# Patient Record
Sex: FEMALE | Race: WHITE | NOT HISPANIC OR LATINO | Employment: OTHER | URBAN - METROPOLITAN AREA
[De-identification: names, ages, dates, MRNs, and addresses within clinical notes are randomized per-mention and may not be internally consistent; named-entity substitution may affect disease eponyms.]

---

## 2017-01-04 DIAGNOSIS — F51.04 CHRONIC INSOMNIA: ICD-10-CM

## 2017-01-04 RX ORDER — ZOLPIDEM TARTRATE 10 MG/1
TABLET ORAL
Qty: 30 TAB | Refills: 0 | Status: SHIPPED
Start: 2017-01-04 | End: 2017-01-31 | Stop reason: SDUPTHER

## 2017-01-31 RX ORDER — ZOLPIDEM TARTRATE 10 MG/1
TABLET ORAL
Qty: 30 TAB | Refills: 0 | Status: SHIPPED
Start: 2017-01-31 | End: 2018-02-27

## 2017-03-24 DIAGNOSIS — E03.4 HYPOTHYROIDISM DUE TO ACQUIRED ATROPHY OF THYROID: ICD-10-CM

## 2017-03-24 RX ORDER — LEVOTHYROXINE SODIUM 0.05 MG/1
TABLET ORAL
Qty: 30 TAB | Refills: 3 | Status: SHIPPED | OUTPATIENT
Start: 2017-03-24 | End: 2017-07-19 | Stop reason: SDUPTHER

## 2017-05-02 RX ORDER — ESTRADIOL 1 MG/1
TABLET ORAL
Qty: 90 TAB | Refills: 2 | Status: SHIPPED | OUTPATIENT
Start: 2017-05-02 | End: 2018-01-29 | Stop reason: SDUPTHER

## 2017-05-12 ENCOUNTER — PATIENT OUTREACH (OUTPATIENT)
Dept: HEALTH INFORMATION MANAGEMENT | Facility: OTHER | Age: 72
End: 2017-05-12

## 2017-05-12 NOTE — PROGRESS NOTES
5/12/17  -  Outcome: Left Message    WebIZ Checked & Epic Updated:  yes    HealthConnect Verified: yes    Attempt # 1

## 2017-05-15 NOTE — PROGRESS NOTES
Attempt #:incoming    WebIZ Checked & Epic Updated: yes  HealthConnect Verified: yes  Verify PCP: yes    Communication Preference Obtained: yes     Review Care Team: yes    Annual Wellness Visit Scheduling  1. Scheduling Status:Scheduled        Care Gap Scheduling (Attempt to Schedule EACH Overdue Care Gap!)     Health Maintenance Due   Topic Date Due   • IMM DTaP/Tdap/Td Vaccine (1 - Tdap) Will discuss with pcp   • Annual Wellness Visit  scheduled         MyChart Activation: declined  MyChart Julissa: no  Virtual Visits: no  Opt In to Text Messages: no

## 2017-06-05 ENCOUNTER — TELEPHONE (OUTPATIENT)
Dept: MEDICAL GROUP | Facility: CLINIC | Age: 72
End: 2017-06-05

## 2017-06-05 NOTE — Clinical Note
[facility]  Lena Camp M.D.  [address]  [place] [address]  Fax: [phone]   Authorization for Release/Disclosure of   Protected Health Information   Name: VANDANA BECKER : 1945 SSN: [id number]   Address: [address] [place] [address] [place] [address] Phone:    [phone] (home)    I authorize the entity listed below to release/disclose the PHI below to:   "[employer]/Lena Camp M.D. and Lena Camp M.D.   Provider or Entity Name: Wesley Barber M.D.       Address   City, [facility]   Phone:      Fax: [phone]     Reason for request: continuity of care   Information to be released:    [  ] LAST COLONOSCOPY,  including any PATH REPORT and follow-up  [  ] LAST FIT/COLOGUARD RESULT [  ] LAST DEXA  [  ] LAST MAMMOGRAM  [  ] LAST PAP  [  ] LAST LABS [ X ] RETINA EXAM REPORT  [  ] IMMUNIZATION RECORDS  [  ] Release all info      [  ] Check here and initial the line next to each item to release ALL health information INCLUDING  _____ Care and treatment for drug and / or alcohol abuse  _____ HIV testing, infection status, or AIDS  _____ Genetic Testing    DATES OF SERVICE OR TIME PERIOD TO BE DISCLOSED: Recent eye exam   I understand and acknowledge that:  * This Authorization may be revoked at any time by you in writing, except if your health information has already been used or disclosed.  * Your health information that will be used or disclosed as a result of you signing this authorization could be re-disclosed by the recipient. If this occurs, your re-disclosed health information may no longer be protected by State or Federal laws.  * You may refuse to sign this Authorization. Your refusal will not affect your ability to obtain treatment.  * This Authorization becomes effective upon signing and will  on (date) __________.      If no date is indicated, this Authorization will  one (1) year from the signature date.    Name: Vandana Becker    Signature:   Date:     6/5/2017       PLEASE FAX REQUESTED RECORDS BACK TO: [phone]

## 2017-06-05 NOTE — Clinical Note
Request for Medical Records    Patient Name: Vandana Beaver    : 1945      Dear Doctor Wesley Barber M.D.       The above named patient receives primary care at the [facility] by Lena Camp M.D..  The patient informs us that you are her eye care Provider.    Please fax a copy of the most recent eye exam to [phone] or answer the  questions below and fax this sheet back to us at the above number.  Attached is a signed Release of Information.      Date of last eye exam: _____________    Retinal eye exam summary:        Please select the choice(s) that apply.    ____ No diabetic retinopathy    ____    Diabetic retinopathy present      Printed Name and Credentials: __________________________________    Signature of Eye Care Provider: _________________________________    We appreciate your assistance and collaboration in providing efficient patient care!    Kindest Regards,    [address] [place] [address] [phone]

## 2017-06-05 NOTE — TELEPHONE ENCOUNTER
ANNUAL WELLNESS VISIT PRE-VISIT PLANNING     1.  Reviewed last PCP office visit assessment and plan notes: Yes    2.  If any orders were placed last visit do we have Results/Consult Notes?        •  Labs? Yes complete TSH       •  Imaging? No        •  Referrals? No     3.  Patient Care Coordination Note was updated with diagnosis information:  No Query for 2017    4.  Patient is due for these Health Maintenance Topics:   Health Maintenance Due   Topic Date Due   • IMM DTaP/Tdap/Td Vaccine (1 - Tdap) 05/26/1964   • Annual Wellness Visit  05/10/2017          •  ALYSE letter was faxed to:  Wesley Barber M.D.   for Retinal exam  records    5.  Immunizations were updated in [employer] using WebIZ?: Yes             •  Is patient due for Tdap/Shingles? Yes.  If yes, was patient alerted of copay? Yes    6.  Patient has No chronic diseases         7.  Updated Care Team with [employer] and all specialists?        •   Gait devices, O2, CPAP, etc: no        •   Eye professional: yes       •   Other specialists (GYN, cardiology, endo, etc): N\A    8.  Is patient in need of any refills prior to office visit? No       •    Separate refill encounter created?: N\A    9.  Patient was informed to arrive 15 min prior to their scheduled appointment and bring in their medication bottles? yes    10.  Patient was advised: “This is a free wellness visit. The provider will screen for medical conditions to help you stay healthy. If you have other concerns to address you may be asked to discuss these at a separate visit or there may be an additional fee.”  Yes

## 2017-06-06 ENCOUNTER — OFFICE VISIT (OUTPATIENT)
Dept: MEDICAL GROUP | Facility: CLINIC | Age: 72
End: 2017-06-06
Payer: MEDICARE

## 2017-06-06 VITALS
WEIGHT: 173 LBS | HEIGHT: 62 IN | RESPIRATION RATE: 16 BRPM | SYSTOLIC BLOOD PRESSURE: 150 MMHG | DIASTOLIC BLOOD PRESSURE: 80 MMHG | HEART RATE: 58 BPM | OXYGEN SATURATION: 95 % | BODY MASS INDEX: 31.83 KG/M2 | TEMPERATURE: 97.5 F

## 2017-06-06 DIAGNOSIS — E03.4 HYPOTHYROIDISM DUE TO ACQUIRED ATROPHY OF THYROID: ICD-10-CM

## 2017-06-06 DIAGNOSIS — Z00.00 MEDICARE ANNUAL WELLNESS VISIT, SUBSEQUENT: ICD-10-CM

## 2017-06-06 DIAGNOSIS — Z87.01 HISTORY OF PNEUMONIA: ICD-10-CM

## 2017-06-06 DIAGNOSIS — Z96.1 HISTORY OF CATARACT REMOVAL WITH INSERTION OF PROSTHETIC LENS: ICD-10-CM

## 2017-06-06 DIAGNOSIS — Z23 NEED FOR TDAP VACCINATION: ICD-10-CM

## 2017-06-06 DIAGNOSIS — Z98.890 HISTORY OF CARPAL TUNNEL SURGERY OF RIGHT WRIST: ICD-10-CM

## 2017-06-06 DIAGNOSIS — E78.5 DYSLIPIDEMIA, GOAL LDL BELOW 160: ICD-10-CM

## 2017-06-06 DIAGNOSIS — Z98.49 HISTORY OF CATARACT REMOVAL WITH INSERTION OF PROSTHETIC LENS: ICD-10-CM

## 2017-06-06 DIAGNOSIS — K76.89 SIMPLE HEPATIC CYST: ICD-10-CM

## 2017-06-06 DIAGNOSIS — F51.04 CHRONIC INSOMNIA: ICD-10-CM

## 2017-06-06 DIAGNOSIS — H40.053 INCREASED INTRAOCULAR PRESSURE, BILATERAL: ICD-10-CM

## 2017-06-06 DIAGNOSIS — E66.9 OBESITY (BMI 30.0-34.9): ICD-10-CM

## 2017-06-06 DIAGNOSIS — N18.30 CKD (CHRONIC KIDNEY DISEASE) STAGE 3, GFR 30-59 ML/MIN (HCC): ICD-10-CM

## 2017-06-06 PROBLEM — E66.811 OBESITY (BMI 30.0-34.9): Status: ACTIVE | Noted: 2017-06-06

## 2017-06-06 PROBLEM — H40.059 INCREASED INTRAOCULAR PRESSURE: Status: ACTIVE | Noted: 2017-06-06

## 2017-06-06 PROCEDURE — G0439 PPPS, SUBSEQ VISIT: HCPCS | Mod: 25 | Performed by: FAMILY MEDICINE

## 2017-06-06 PROCEDURE — 90471 IMMUNIZATION ADMIN: CPT | Performed by: FAMILY MEDICINE

## 2017-06-06 PROCEDURE — G8432 DEP SCR NOT DOC, RNG: HCPCS | Performed by: FAMILY MEDICINE

## 2017-06-06 PROCEDURE — 1036F TOBACCO NON-USER: CPT | Performed by: FAMILY MEDICINE

## 2017-06-06 PROCEDURE — 90715 TDAP VACCINE 7 YRS/> IM: CPT | Performed by: FAMILY MEDICINE

## 2017-06-06 RX ORDER — TIMOLOL MALEATE 5 MG/ML
1 SOLUTION/ DROPS OPHTHALMIC 2 TIMES DAILY
COMMUNITY
End: 2022-11-17

## 2017-06-06 ASSESSMENT — PAIN SCALES - GENERAL: PAINLEVEL: NO PAIN

## 2017-06-06 ASSESSMENT — PATIENT HEALTH QUESTIONNAIRE - PHQ9: CLINICAL INTERPRETATION OF PHQ2 SCORE: 0

## 2017-06-06 NOTE — MR AVS SNAPSHOT
"        Vandana Beaver   2017 1:00 PM   Office Visit   MRN: [id number]    Department:  [facility]   Dept Phone:  [phone]    Description:  Female : 1945   Provider:  Lena Camp M.D.; [facility]            Reason for Visit     Annual Wellness Visit           Allergies as of 2017     Allergen Noted Reactions    Codeine 2010   Vomiting      You were diagnosed with     Medicare annual wellness visit, subsequent   [180806]       Need for Tdap vaccination   [350309]       Age-related nuclear cataract of right eye   [921632]       Carpal tunnel syndrome of right wrist   [759466]       Chronic insomnia   [303197]       CKD (chronic kidney disease) stage 3, GFR 30-59 ml/min   [345944]       Dyslipidemia, goal LDL below 160   [525114]       History of pneumonia   [918985]       Hypothyroidism due to acquired atrophy of thyroid   [2714233]       Obesity (BMI 30.0-34.9)   [963089]       Increased intraocular pressure, bilateral   [5408560]       Simple hepatic cyst   [799675]         Vital Signs     Blood Pressure Pulse Temperature Respirations Height Weight    150/80 mmHg 58 36.4 °C (97.6 °F) 16 1.575 m (5' 2.01\") 78.472 kg (173 lb)    Body Mass Index Oxygen Saturation Smoking Status             31.63 kg/m2 95% Never Smoker          Basic Information     Date Of Birth Sex Race Ethnicity Preferred Language    1945 Female White Non- English      Problem List              ICD-10-CM Priority Class Noted - Resolved    Dyslipidemia, goal LDL below 160 E78.5   2010 - Present    Chronic insomnia F51.04   Unknown - Present    Seasonal allergies J30.2   Unknown - Present    MEDICAL HOME    10/23/2012 - Present    Carpal tunnel syndrome of right wrist G56.01   Unknown - Present    Hypothyroidism due to acquired atrophy of thyroid E03.4   Unknown - Present    History of pneumonia Z87.01   2014 - Present    CKD (chronic kidney disease) stage 3, GFR 30-59 ml/min N18.3 " Medium  5/21/2015 - Present    Age-related nuclear cataract of eye H25.10   9/1/2016 - Present    Age-related nuclear cataract of right eye H25.11   9/14/2016 - Present    Obesity (BMI 30.0-34.9) E66.9   6/6/2017 - Present    Hypothyroidism E03.9   Unknown - Present    Increased intraocular pressure H40.059   6/6/2017 - Present    Simple hepatic cyst K76.89   6/6/2017 - Present      Health Maintenance        Date Due Completion Dates    IMM DTaP/Tdap/Td Vaccine (1 - Tdap) 5/26/1964 ---    MAMMOGRAM 12/7/2017 12/7/2016, 11/9/2015, 9/22/2014, 9/11/2013, 8/17/2012, 7/27/2011, 5/20/2010, 5/20/2010, 1/22/2008, 1/22/2008, 12/4/2006, 12/4/2006, 2/22/2006, 9/2/2005, 7/14/2004    BONE DENSITY 6/2/2020 6/2/2015, 5/20/2010, 1/22/2008, 9/2/2005    COLONOSCOPY 10/18/2020 10/18/2010 (Prv Comp), 10/18/2010    Override on 10/18/2010: Previously completed            Current Immunizations     13-VALENT PCV PREVNAR 5/9/2016  1:45 PM    Influenza TIV (IM) 10/9/2014, 10/2/2013    Influenza Vaccine Adult HD 10/14/2015    Influenza Vaccine Quad Inj (Pf) 10/6/2010    Influenza Vaccine Quad Inj (Preserved) 10/8/2012, 10/15/2011    Pneumococcal polysaccharide vaccine (PPSV-23) 7/29/2013, 7/3/2013    SHINGLES VACCINE 11/5/2013    Tdap Vaccine  Incomplete      Below and/or attached are the medications your provider expects you to take. Review all of your home medications and newly ordered medications with your provider and/or pharmacist. Follow medication instructions as directed by your provider and/or pharmacist. Please keep your medication list with you and share with your provider. Update the information when medications are discontinued, doses are changed, or new medications (including over-the-counter products) are added; and carry medication information at all times in the event of emergency situations     Allergies:  CODEINE - Vomiting               Medications  Valid as of: June 06, 2017 -  2:37 PM    Generic Name Brand Name Tablet  Size Instructions for use    Calcium Carbonate-Vitamin D   Take  by mouth every day.        Cetirizine HCl (Tab) ZYRTEC 10 MG Take 10 mg by mouth as needed.        Estradiol (Tab) ESTRACE 1 MG TAKE ONE TABLET BY MOUTH ONCE DAILY        Krill Oil   Take  by mouth every day.        Levothyroxine Sodium (Tab) SYNTHROID 50 MCG TAKE ONE TABLET BY MOUTH ONCE DAILY IN THE MORNING ON  AN  EMPTY  STOMACH        Misc Natural Products   Take  by mouth every day.        Multiple Vitamins-Minerals   Take  by mouth every day.        Multiple Vitamins-Minerals   Take  by mouth every day.        Timolol Maleate (Solution) TIMOPTIC 0.5 % Place 1 Drop in both eyes 2 times a day.        Zolpidem Tartrate (Tab) AMBIEN 10 MG TAKE ONE TABLET BY MOUTH ONCE DAILY AT BEDTIME AS NEEDED        .                 Medicines prescribed today were sent to:     [facility] [address] [place] [address], [place] - [address]    [address] [place] [address]    Phone: [phone] Fax: [phone]    Open 24 Hours?: No      Medication refill instructions:       If your prescription bottle indicates you have medication refills left, it is not necessary to call your provider’s office. Please contact your pharmacy and they will refill your medication.    If your prescription bottle indicates you do not have any refills left, you may request refills at any time through one of the following ways: The online [employer] system (except Urgent Care), by calling your provider’s office, or by asking your pharmacy to contact your provider’s office with a refill request. Medication refills are processed only during regular business hours and may not be available until the next business day. Your provider may request additional information or to have a follow-up visit with you prior to refilling your medication.   *Please Note: Medication refills are assigned a new Rx number when refilled electronically. Your pharmacy may indicate that no refills were authorized  even though a new prescription for the same medication is available at the pharmacy. Please request the medicine by name with the pharmacy before contacting your provider for a refill.        Your To Do List     Future Labs/Procedures Complete By Expires    CBC WITHOUT DIFFERENTIAL  As directed 12/7/2017    COMP METABOLIC PANEL  As directed 6/7/2018    LIPID PROFILE  As directed 6/7/2018    TSH  As directed 6/7/2018      Other Notes About Your Plan     Patient is enrolled in [facility] With Dr. Camp.            MyChart Status: Patient Declined

## 2017-06-06 NOTE — PROGRESS NOTES
Chief Complaint   Patient presents with   • Annual Wellness Visit         HPI:  Vandana Beaver is a 72 y.o. female here for Medicare Annual Wellness Visit    She drives.  She lives with her spouse.      Patient Active Problem List    Diagnosis Date Noted   • CKD (chronic kidney disease) stage 3, GFR 30-59 ml/min 05/21/2015     Priority: Medium   • Age-related nuclear cataract of right eye 09/14/2016   • Age-related nuclear cataract of eye 09/01/2016   • History of pneumonia 02/19/2014   • Hypothyroidism due to acquired atrophy of thyroid    • Carpal tunnel syndrome of right wrist    • MEDICAL HOME 10/23/2012   • Seasonal allergies    • Chronic insomnia    • Dyslipidemia, goal LDL below 160 05/01/2010       Current Outpatient Prescriptions   Medication Sig Dispense Refill   • timolol (TIMOPTIC) 0.5 % Solution Place 1 Drop in both eyes 2 times a day.     • estradiol (ESTRACE) 1 MG Tab TAKE ONE TABLET BY MOUTH ONCE DAILY 90 Tab 2   • levothyroxine (SYNTHROID) 50 MCG Tab TAKE ONE TABLET BY MOUTH ONCE DAILY IN THE MORNING ON  AN  EMPTY  STOMACH 30 Tab 3   • Calcium Carbonate-Vitamin D (CALTRATE 600+D PO) Take  by mouth every day.     • KRILL OIL PO Take  by mouth every day.     • Multiple Vitamins-Minerals (MULTIVITAMIN PO) Take  by mouth every day.     • zolpidem (AMBIEN) 10 MG Tab TAKE ONE TABLET BY MOUTH ONCE DAILY AT BEDTIME AS NEEDED 30 Tab 0   • Multiple Vitamins-Minerals (VISION FORMULA PO) Take  by mouth every day.     • Misc Natural Products (OSTEO BI-FLEX JOINT SHIELD PO) Take  by mouth every day.     • cetirizine (ZYRTEC) 10 MG TABS Take 10 mg by mouth as needed. 30 Tab 3     No current facility-administered medications for this visit.        Patient is taking medications as noted in medication list.  Current supplements as per medication list.   Chronic narcotic pain medicines: no    Allergies: Codeine    Current social contact/activities: Vandana is involved with hot august nights, gets together with  family often     Is patient current with immunizations?  No, due for TDAP. Patient is interested in receiving TDAP today.     She  reports that she has never smoked. She has never used smokeless tobacco. She reports that she drinks alcohol. She reports that she does not use illicit drugs.  Counseling given: Not Answered        DPA/Advanced Directive:  Patient has Advanced Directive on file.     ROS:    Gait: Uses no assistive device   Ostomy: no   Other tubes: no   Amputations: no   Chronic oxygen use: no   Last eye exam: 4/3/17   Wears hearing aids: no   : Denies incontinence.   Denies chest pain, pressure, palpitations or exertional SOB  Denies abdominal pain, pressure.  Denies blood or mucus in the stool.      Depression Screening    Little interest or pleasure in doing things?  0 - not at all  Feeling down, depressed, or hopeless?  0 - not at all  Patient Health Questionnaire Score: 0  If depressive symptoms identified deferred to follow up visit unless specifically addressed in assessment and plan.    Interpretation of PHQ-9 Total Score   Score Severity   1-4 Minimal Depression   5-9 Mild Depression   10-14 Moderate Depression   15-19 Moderately Severe Depression   20-27 Severe Depression    Screening for Cognitive Impairment    Three Minute Recall (banana, sunrise, fence)  3/3    Draw clock face with all 12 numbers set to the hand to show 10 minutes past 11 o'clock  1 5/5  If cognitive concerns identified deferred to follow up visit unless specifically addressed in assessment and plan.    Fall Risk Assessment    Has the patient had two or more falls in the last year or any fall with injury in the last year?  No  If Fall Risk identified deferred to follow up visit unless specifically addressed in assessment and plan.    Safety Assessment    Throw rugs on floor.  Yes  Handrails on all stairs.  Yes  Good lighting in all hallways.  Yes  Difficulty hearing.  No  Patient counseled about all safety risks that were  identified.    Functional Assessment ADLs    Are there any barriers preventing you from cooking for yourself or meeting nutritional needs?  No.    Are there any barriers preventing you from driving safely or obtaining transportation?  No.    Are there any barriers preventing you from using a telephone or calling for help?  No.    Are there any barriers preventing you from shopping?  No.    Are there any barriers preventing you from taking care of your own finances?  No.    Are there any barriers preventing you from managing your medications?  No.    Are currently engaging any exercise or physical activity?  Yes.  Vandana goes to the gym 3 days a week approx 1 hr each time, cardio treadmill, bike    Health Maintenance Summary                IMM DTaP/Tdap/Td Vaccine Overdue 5/26/1964     Annual Wellness Visit Overdue 5/10/2017      Done 5/9/2016 Visit Dx: Medicare annual wellness visit, subsequent     Patient has more history with this topic...    MAMMOGRAM Next Due 12/7/2017      Done 12/7/2016 MA-MAMMO SCREENING BILAT W/TOMOSYNTHESIS W/CAD     Patient has more history with this topic...    BONE DENSITY Next Due 6/2/2020      Done 6/2/2015 DS-BONE DENSITY STUDY (DEXA)     Patient has more history with this topic...    COLONOSCOPY Next Due 10/18/2020      Done 10/18/2010 REFERRAL TO GI FOR COLONOSCOPY     Patient has more history with this topic...          Patient Care Team:  Lena Camp M.D. as PCP - General  Wesley Barber M.D. as Consulting Physician (Ophthalmology)    Social History   Substance Use Topics   • Smoking status: Never Smoker    • Smokeless tobacco: Never Used   • Alcohol Use: 0.0 oz/week     0 Standard drinks or equivalent per week      Comment: 1 per month     Family History   Problem Relation Age of Onset   • Diabetes Maternal Grandfather    • Lung Disease Mother      66, heavy smoker, emphysema   • Osteoporosis Mother      She  has a past medical history of Seasonal allergies; Dyslipidemia,  "goal LDL below 160 (5/2010); Chronic insomnia; Carpal tunnel syndrome of right wrist; Anesthesia; MEDICAL HOME (10/23/12); Hypothyroidism; History of pneumonia; CKD (chronic kidney disease) stage 3, GFR 30-59 ml/min; Cataract; and Pneumonia (1/8/2016). She also has no past medical history of Breast cancer (CMS-HCC).   Past Surgical History   Procedure Laterality Date   • Abdominal hysterectomy total  1980s     +ovaries + falopian, uterine fibroids   • Colonoscopy  10/18/10     martha, Dr. Leung   • [place] by laparoscopy  11/11/2011     Performed by HECTOR TALAVERA at SURGERY [facility]   • Tonsillectomy  1960   • Carpal tunnel release Right 4/15/2016     Procedure: CARPAL TUNNEL RELEASE;  Surgeon: Chalino Urrutia M.D.;  Location: SURGERY [facility];  Service:    • Cataract phaco with iol Left 9/1/2016     Procedure: CATARACT PHACO WITH IOL;  Surgeon: Wesley Barber M.D.;  Location: SURGERY SAME DAY [facility];  Service:    • Cataract phaco with iol Right 9/14/2016     Procedure: CATARACT PHACO WITH IOL KPE;  Surgeon: Wesley Barber M.D.;  Location: SURGERY SAME DAY [facility];  Service:            Exam:     Blood pressure 150/80, pulse 58, temperature 36.4 °C (97.6 °F), resp. rate 16, height 1.575 m (5' 2.01\"), weight 78.472 kg (173 lb), SpO2 95 %. Body mass index is 31.63 kg/(m^2).    Hearing good.    Dentition good  Alert, oriented in no acute distress.  Eye contact is good, speech goal directed, affect calm  HEENT:   EOMI, PERRLA.   Oropharynx is well hydrated with normal soft palate motion. No exudate or ulcerations noted. Ear canals are patent, normal cerumen, TMs are pearly grey and quiet in appearance.  Neck:       Full range of motion except mild limitation to extension, moderate posterior cervical spasm. No JVD or carotid bruits appreciated. No cervical adenopathy appreciated. No thyromegaly or neck masses appreciated.    Lungs:     Clear to auscultation A&P with good air movement.   Heart:  "     Regular rate and rhythm normal S1 and S2 without murmur appreciated.  Strong and symmetric radial and DP pulses.  Abd:         Soft, bowel sounds positive, nontender. No bloating noted. No hepatosplenomegaly or mass appreciated. No pulsatile mass appreciated.  Ext:          Extremities show symmetric and full range of motion with normal strength. No cyanosis or clubbing appreciated. No peripheral edema appreciated.  Neuro:     Gait is normal. Patient is lucid, fluent and appropriate. No significant tremor appreciated.  Skin:        Exhibit no rashes, pigmented lesions or ulcerations.      Assessment and Plan. The following treatment and monitoring plan is recommended:      1. Medicare annual wellness visit, subsequent  Her medical problems are generally stable    2. Need for Tdap vaccination  Administered today, patient has been alerted to the co-pay  - TDAP VACCINE =>8YO IM    3. History of cataract removal bilaterally with intraocular lens  She continues to follow with ophthalmology yearly.  She feels this greatly improved her vision. Stable.    4. History of carpal tunnel release right wrist  This has been successful for her. She denies significant symptoms from this at this time. Denies symptoms in the left wrist. Stable.    5. Chronic insomnia  This is severe and has been present since childhood. She struggled with the discontinuation of the Ambien. It took nearly 3 months for her to reestablish a sleeping pattern. She still has to use over-the-counter sleeping aids from time to time.  She feels this is currently stable.    6. CKD (chronic kidney disease) stage 3, GFR 30-59 ml/min  Patient has long-standing chronic kidney disease, stage 3. This has been stable over the last 8 years. It is mild.  Patient states has been keeping well hydrated and has been trying to walk daily. The patient avoids non-steroidal anti-inflammatory medications. This is clinically stable.     7. Dyslipidemia target LDL below  160  Patient denies chest pain, chest pressure, palpitations or exertional shortness of breath. She continues a cruel oil supplement. Her dyslipidemia has been moderately high with a reasonable risk ratio. She is not on a statin.. She is a never smoker. She takes 81 mg aspirin twice a week. She has no history of myocardial infarction, stroke or PVD.  The problem is clinically stable.    8. History of pneumonia  None since, has completed the pneumonia vaccine series.    9. Hypothyroidism due to acquired atrophy of thyroid  Patient reports good energy level on the medication. Patient denies insomnia, tremor or change in appetite.  Patient is taking the medication on an empty stomach in the morning and waiting at least 30 minutes before eating.  Last TSH in May 2016 was at target.  This is clinically stable.    10. Obesity (BMI 30.0-34.9)  Discussed her overweight and overall weight gain.  She states she is aware and will be increasing her exercise. She confesses to a weakness for ice cream. Discussed good dietary choices.  - Patient identified as having weight management issue.  Appropriate orders and counseling given.    11. Increased intraocular pressure, bilateral  She has been placed on atenolol with a resolution of her increased intraocular pressure. She initially was 24 and 27 and she is now at 16 and 17. She continues to follow with Dr. Barber her ophthalmologist on a regular basis, every 6 months. She will continue the atenolol at his direction. Stable problem.    12. Simple hepatic cyst  She has multiple simple hepatic cysts which have been gradually enlarging. They have been imaged with both ultrasound and CT and are felt to be benign. They were first picked up on imaging over 10 years ago. This is a stable problem.    Routine lab orders are placed    Services suggested: No services needed at this time  Health Care Screening recommendations as per orders if indicated.  Referrals offered: PT/OT/Nutrition  counseling/Behavioral Health/Smoking cessation as per orders if indicated.    Discussion today about general wellness and lifestyle habits:  discussed  · Prevent falls and reduce trip hazards; Cautioned about securing or removing rugs.  · Have a working fire alarm and carbon monoxide detector; yes  · Engage in regular physical activity and social activities       Follow-up: six months

## 2017-06-08 ENCOUNTER — HOSPITAL ENCOUNTER (OUTPATIENT)
Dept: LAB | Facility: MEDICAL CENTER | Age: 72
End: 2017-06-08
Attending: FAMILY MEDICINE
Payer: MEDICARE

## 2017-06-08 DIAGNOSIS — E78.5 DYSLIPIDEMIA, GOAL LDL BELOW 160: ICD-10-CM

## 2017-06-08 DIAGNOSIS — E03.4 HYPOTHYROIDISM DUE TO ACQUIRED ATROPHY OF THYROID: ICD-10-CM

## 2017-06-08 DIAGNOSIS — N18.30 CKD (CHRONIC KIDNEY DISEASE) STAGE 3, GFR 30-59 ML/MIN (HCC): ICD-10-CM

## 2017-06-08 LAB
ALBUMIN SERPL BCP-MCNC: 3.8 G/DL (ref 3.2–4.9)
ALBUMIN/GLOB SERPL: 1.5 G/DL
ALP SERPL-CCNC: 49 U/L (ref 30–99)
ALT SERPL-CCNC: 14 U/L (ref 2–50)
ANION GAP SERPL CALC-SCNC: 6 MMOL/L (ref 0–11.9)
AST SERPL-CCNC: 14 U/L (ref 12–45)
BILIRUB SERPL-MCNC: 0.4 MG/DL (ref 0.1–1.5)
BUN SERPL-MCNC: 14 MG/DL (ref 8–22)
CALCIUM SERPL-MCNC: 8.6 MG/DL (ref 8.5–10.5)
CHLORIDE SERPL-SCNC: 106 MMOL/L (ref 96–112)
CHOLEST SERPL-MCNC: 240 MG/DL (ref 100–199)
CO2 SERPL-SCNC: 26 MMOL/L (ref 20–33)
CREAT SERPL-MCNC: 0.95 MG/DL (ref 0.5–1.4)
ERYTHROCYTE [DISTWIDTH] IN BLOOD BY AUTOMATED COUNT: 43.2 FL (ref 35.9–50)
GFR SERPL CREATININE-BSD FRML MDRD: 58 ML/MIN/1.73 M 2
GLOBULIN SER CALC-MCNC: 2.6 G/DL (ref 1.9–3.5)
GLUCOSE SERPL-MCNC: 88 MG/DL (ref 65–99)
HCT VFR BLD AUTO: 42.2 % (ref 37–47)
HDLC SERPL-MCNC: 86 MG/DL
HGB BLD-MCNC: 13.9 G/DL (ref 12–16)
LDLC SERPL CALC-MCNC: 134 MG/DL
MCH RBC QN AUTO: 32.7 PG (ref 27–33)
MCHC RBC AUTO-ENTMCNC: 32.9 G/DL (ref 33.6–35)
MCV RBC AUTO: 99.3 FL (ref 81.4–97.8)
PLATELET # BLD AUTO: 229 K/UL (ref 164–446)
PMV BLD AUTO: 12 FL (ref 9–12.9)
POTASSIUM SERPL-SCNC: 4.3 MMOL/L (ref 3.6–5.5)
PROT SERPL-MCNC: 6.4 G/DL (ref 6–8.2)
RBC # BLD AUTO: 4.25 M/UL (ref 4.2–5.4)
SODIUM SERPL-SCNC: 138 MMOL/L (ref 135–145)
TRIGL SERPL-MCNC: 101 MG/DL (ref 0–149)
TSH SERPL DL<=0.005 MIU/L-ACNC: 3.02 UIU/ML (ref 0.3–3.7)
WBC # BLD AUTO: 6.9 K/UL (ref 4.8–10.8)

## 2017-06-08 PROCEDURE — 84443 ASSAY THYROID STIM HORMONE: CPT

## 2017-06-08 PROCEDURE — 85027 COMPLETE CBC AUTOMATED: CPT

## 2017-06-08 PROCEDURE — 36415 COLL VENOUS BLD VENIPUNCTURE: CPT

## 2017-06-08 PROCEDURE — 80053 COMPREHEN METABOLIC PANEL: CPT

## 2017-06-08 PROCEDURE — 80061 LIPID PANEL: CPT

## 2017-07-19 DIAGNOSIS — E03.4 HYPOTHYROIDISM DUE TO ACQUIRED ATROPHY OF THYROID: ICD-10-CM

## 2017-07-19 RX ORDER — LEVOTHYROXINE SODIUM 0.05 MG/1
TABLET ORAL
Qty: 30 TAB | Refills: 0 | Status: SHIPPED | OUTPATIENT
Start: 2017-07-19 | End: 2017-08-21 | Stop reason: SDUPTHER

## 2017-08-21 RX ORDER — LEVOTHYROXINE SODIUM 0.05 MG/1
TABLET ORAL
Qty: 30 TAB | Refills: 3 | Status: SHIPPED | OUTPATIENT
Start: 2017-08-21 | End: 2017-12-19 | Stop reason: SDUPTHER

## 2017-10-25 ENCOUNTER — APPOINTMENT (RX ONLY)
Dept: URBAN - METROPOLITAN AREA CLINIC 4 | Facility: CLINIC | Age: 72
Setting detail: DERMATOLOGY
End: 2017-10-25

## 2017-10-25 DIAGNOSIS — L81.4 OTHER MELANIN HYPERPIGMENTATION: ICD-10-CM

## 2017-10-25 DIAGNOSIS — L57.0 ACTINIC KERATOSIS: ICD-10-CM

## 2017-10-25 DIAGNOSIS — L82.1 OTHER SEBORRHEIC KERATOSIS: ICD-10-CM

## 2017-10-25 PROBLEM — E03.9 HYPOTHYROIDISM, UNSPECIFIED: Status: ACTIVE | Noted: 2017-10-25

## 2017-10-25 PROCEDURE — ? LIQUID NITROGEN

## 2017-10-25 PROCEDURE — 17000 DESTRUCT PREMALG LESION: CPT

## 2017-10-25 PROCEDURE — 99202 OFFICE O/P NEW SF 15 MIN: CPT | Mod: 25

## 2017-10-25 PROCEDURE — 17003 DESTRUCT PREMALG LES 2-14: CPT

## 2017-10-25 PROCEDURE — ? COUNSELING

## 2017-10-25 ASSESSMENT — LOCATION DETAILED DESCRIPTION DERM
LOCATION DETAILED: RIGHT PROXIMAL CALF
LOCATION DETAILED: RIGHT DISTAL DORSAL FOREARM
LOCATION DETAILED: RIGHT RADIAL DORSAL HAND
LOCATION DETAILED: RIGHT PROXIMAL DORSAL FOREARM
LOCATION DETAILED: LEFT RADIAL DORSAL HAND
LOCATION DETAILED: LEFT DISTAL DORSAL FOREARM
LOCATION DETAILED: RIGHT ULNAR DORSAL HAND
LOCATION DETAILED: LEFT PROXIMAL CALF
LOCATION DETAILED: LEFT PROXIMAL DORSAL FOREARM
LOCATION DETAILED: RIGHT DISTAL CALF

## 2017-10-25 ASSESSMENT — LOCATION SIMPLE DESCRIPTION DERM
LOCATION SIMPLE: RIGHT CALF
LOCATION SIMPLE: RIGHT FOREARM
LOCATION SIMPLE: LEFT HAND
LOCATION SIMPLE: LEFT FOREARM
LOCATION SIMPLE: LEFT CALF
LOCATION SIMPLE: RIGHT HAND

## 2017-10-25 ASSESSMENT — LOCATION ZONE DERM
LOCATION ZONE: LEG
LOCATION ZONE: HAND
LOCATION ZONE: ARM

## 2017-10-25 NOTE — PROCEDURE: LIQUID NITROGEN
Number Of Freeze-Thaw Cycles: 1 freeze-thaw cycle
Consent: The patient's consent was obtained including but not limited to risks of crusting, scabbing, blistering, scarring, darker or lighter pigmentary change, recurrence, incomplete removal and infection.
Post-Care Instructions: I reviewed with the patient in detail post-care instructions. Patient is to wear sunprotection, and avoid picking at any of the treated lesions. Pt may apply Vaseline to crusted or scabbing areas.
Detail Level: Detailed
Duration Of Freeze Thaw-Cycle (Seconds): 5
Render Post-Care Instructions In Note?: no

## 2017-12-19 RX ORDER — LEVOTHYROXINE SODIUM 0.05 MG/1
TABLET ORAL
Qty: 90 TAB | Refills: 0 | Status: SHIPPED | OUTPATIENT
Start: 2017-12-19 | End: 2018-02-27 | Stop reason: SDUPTHER

## 2018-01-15 ENCOUNTER — HOSPITAL ENCOUNTER (OUTPATIENT)
Dept: RADIOLOGY | Facility: MEDICAL CENTER | Age: 73
End: 2018-01-15
Attending: FAMILY MEDICINE
Payer: MEDICARE

## 2018-01-15 DIAGNOSIS — Z12.31 VISIT FOR SCREENING MAMMOGRAM: ICD-10-CM

## 2018-01-15 PROCEDURE — 77067 SCR MAMMO BI INCL CAD: CPT

## 2018-01-22 ENCOUNTER — HOSPITAL ENCOUNTER (OUTPATIENT)
Dept: RADIOLOGY | Facility: MEDICAL CENTER | Age: 73
End: 2018-01-22
Attending: FAMILY MEDICINE
Payer: MEDICARE

## 2018-01-22 DIAGNOSIS — R92.8 ABNORMAL MAMMOGRAM: ICD-10-CM

## 2018-01-22 PROCEDURE — 76642 ULTRASOUND BREAST LIMITED: CPT | Mod: LT

## 2018-01-22 PROCEDURE — 77065 DX MAMMO INCL CAD UNI: CPT | Mod: LT

## 2018-01-29 RX ORDER — ESTRADIOL 1 MG/1
TABLET ORAL
Qty: 90 TAB | Refills: 2 | Status: SHIPPED | OUTPATIENT
Start: 2018-01-29 | End: 2019-02-24 | Stop reason: SDUPTHER

## 2018-02-07 ENCOUNTER — TELEPHONE (OUTPATIENT)
Dept: RADIOLOGY | Facility: MEDICAL CENTER | Age: 73
End: 2018-02-07

## 2018-02-07 NOTE — TELEPHONE ENCOUNTER
Spoke w/ pt to conf apt @ [facility] on 2/8 @ 8:45 check in @ 8:15, reviewed prep and location

## 2018-02-08 ENCOUNTER — HOSPITAL ENCOUNTER (OUTPATIENT)
Dept: RADIOLOGY | Facility: MEDICAL CENTER | Age: 73
End: 2018-02-08
Attending: FAMILY MEDICINE
Payer: MEDICARE

## 2018-02-08 DIAGNOSIS — R92.8 ABNORMAL FINDING ON BREAST IMAGING: ICD-10-CM

## 2018-02-08 PROCEDURE — 19083 BX BREAST 1ST LESION US IMAG: CPT

## 2018-02-08 PROCEDURE — 19084 BX BREAST ADD LESION US IMAG: CPT

## 2018-02-08 PROCEDURE — 88305 TISSUE EXAM BY PATHOLOGIST: CPT

## 2018-02-09 ENCOUNTER — TELEPHONE (OUTPATIENT)
Dept: RADIOLOGY | Facility: MEDICAL CENTER | Age: 73
End: 2018-02-09

## 2018-02-13 ENCOUNTER — TELEPHONE (OUTPATIENT)
Dept: RADIOLOGY | Facility: MEDICAL CENTER | Age: 73
End: 2018-02-13

## 2018-02-27 ENCOUNTER — OFFICE VISIT (OUTPATIENT)
Dept: MEDICAL GROUP | Facility: CLINIC | Age: 73
End: 2018-02-27
Payer: MEDICARE

## 2018-02-27 VITALS
RESPIRATION RATE: 12 BRPM | TEMPERATURE: 97.2 F | SYSTOLIC BLOOD PRESSURE: 124 MMHG | HEART RATE: 77 BPM | BODY MASS INDEX: 30.22 KG/M2 | WEIGHT: 177 LBS | OXYGEN SATURATION: 91 % | DIASTOLIC BLOOD PRESSURE: 78 MMHG | HEIGHT: 64 IN

## 2018-02-27 DIAGNOSIS — E66.9 OBESITY (BMI 30.0-34.9): ICD-10-CM

## 2018-02-27 DIAGNOSIS — R92.8 ABNORMAL MAMMOGRAM OF LEFT BREAST: ICD-10-CM

## 2018-02-27 DIAGNOSIS — E03.4 HYPOTHYROIDISM DUE TO ACQUIRED ATROPHY OF THYROID: ICD-10-CM

## 2018-02-27 PROCEDURE — 99213 OFFICE O/P EST LOW 20 MIN: CPT | Performed by: FAMILY MEDICINE

## 2018-02-27 RX ORDER — LEVOTHYROXINE SODIUM 0.05 MG/1
50 TABLET ORAL
Qty: 90 TAB | Refills: 2 | Status: SHIPPED | OUTPATIENT
Start: 2018-02-27 | End: 2018-12-16 | Stop reason: SDUPTHER

## 2018-02-27 NOTE — PROGRESS NOTES
Chief Complaint   Patient presents with   • Breast Mass   • Hypothyroidism       Subjective:     HPI:   Vandana Beaver presents today with the followin. Abnormal mammogram of left breast  Had mammograms with an abnormal pattern in the upper left breast.  Had biopsies of the area, all cores negative for atypia or malignancy.  Expected scar and hemosiderosis from previous cyst intervention seen.  Estradiol dose reduced.  Recheck in six months with mammogram and ultrasound agreed upon.    2. Obesity (BMI 30.0-34.9)  She has gained a little weight.  Has heavy clothes on today.  Discussed current diet.    3. Hypothyroidism due to acquired atrophy of thyroid  Patient reports good energy level on the medication. Patient denies insomnia, tremor or change in appetite.  Patient is taking the medication on an empty stomach in the morning and waiting at least 30 minutes before eating.  Last TSH in 2017 was at target.    Asked her to reduce the estradiol to 1/2 tab per day.      Patient Active Problem List    Diagnosis Date Noted   • CKD (chronic kidney disease) stage 3, GFR 30-59 ml/min 2015     Priority: Medium   • Obesity (BMI 30.0-34.9) 2017   • Increased intraocular pressure 2017   • Simple hepatic cyst 2017   • Hypothyroidism    • History of cataract removal with insertion of prosthetic lens 2016   • History of pneumonia 2014   • Hypothyroidism due to acquired atrophy of thyroid    • History of carpal tunnel surgery of right wrist    • MEDICAL HOME 10/23/2012   • Seasonal allergies    • Chronic insomnia    • Dyslipidemia, goal LDL below 160 2010       Current medicines (including changes today)  Current Outpatient Prescriptions   Medication Sig Dispense Refill   • levothyroxine (SYNTHROID) 50 MCG Tab Take 1 Tab by mouth Every morning on an empty stomach. 90 Tab 2   • timolol (TIMOPTIC) 0.5 % Solution Place 1 Drop in both eyes 2 times a day.     • Multiple  "Vitamins-Minerals (VISION FORMULA PO) Take  by mouth every day.     • Calcium Carbonate-Vitamin D (CALTRATE 600+D PO) Take  by mouth every day.     • KRILL OIL PO Take  by mouth every day.     • Multiple Vitamins-Minerals (MULTIVITAMIN PO) Take  by mouth every day.     • Misc Natural Products (OSTEO BI-FLEX JOINT SHIELD PO) Take  by mouth every day.     • cetirizine (ZYRTEC) 10 MG TABS Take 10 mg by mouth as needed. 30 Tab 3   • estradiol (ESTRACE) 1 MG Tab TAKE ONE TABLET BY MOUTH ONCE DAILY 90 Tab 2     No current facility-administered medications for this visit.        Allergies   Allergen Reactions   • Codeine Vomiting       ROS: As per HPI       Objective:     Blood pressure 124/78, pulse 77, temperature 36.2 °C (97.2 °F), resp. rate 12, height 1.626 m (5' 4\"), weight 80.3 kg (177 lb), SpO2 91 %. Body mass index is 30.38 kg/m².    Physical Exam:  Constitutional: Well-developed and well-nourished. Not diaphoretic. No distress. Lucid and fluent.  Skin: Skin is warm and dry. No rash noted.  Head: Atraumatic without lesions.  Eyes: Conjunctivae and extraocular motions are normal. Pupils are equal, round, and reactive to light. No scleral icterus.   Mouth/Throat: Tongue normal. Oropharynx is clear and moist. Posterior pharynx without erythema or exudates.  Neck: Supple, trachea midline. No thyromegaly present. No cervical or supraclavicular lymphadenopathy. No JVD or carotid bruits appreciated  Cardiovascular: Regular rate and rhythm.  Normal S1, S2 without murmur appreciated.  Chest: Effort normal. Clear to auscultation throughout. No adventitious sounds.   Extremities: No cyanosis, clubbing, erythema, nor edema.   Neurological: Alert and oriented x 3.   Psychiatric:  Behavior, mood, and affect are appropriate.    Pathology results are discussed at length.  They are benign.  She had previous cysts drained in that area.       Assessment and Plan:     72 y.o. female with the following issues:    1. Abnormal mammogram " of left breast     2. Obesity (BMI 30.0-34.9)  Patient identified as having weight management issue.  Appropriate orders and counseling given.   3. Hypothyroidism due to acquired atrophy of thyroid  levothyroxine (SYNTHROID) 50 MCG Tab         Followup: No Follow-up on file.

## 2018-05-30 NOTE — PROGRESS NOTES
Outcome: Left Message    Please transfer to Patient Outreach Team at [phone] when patient returns call.    [facility] Verified: yes    Attempt # 1

## 2018-05-31 NOTE — PROGRESS NOTES
1. Attempt #: 1    2. [facility] Verified: yes    3. Verify PCP: yes    4. Care Team Updated:       •   DME Company (gait device, O2, CPAP, etc.): YES       •   Other Specialists (eye doctor, derm, GYN, cardiology, endo, etc): YES    5.  Reviewed/Updated the following with patient:       •   Communication Preference Obtained? YES       •   Preferred Pharmacy? YES       •   Preferred Lab? YES       •   Family History (document living status of immediate family members and if + hx of cancer, diabetes, hypertension, hyperlipidemia, heart attack, stroke) YES. Was Abstract Encounter opened and chart updated? YES    6. [employer] Activation: declined    7. [employer] Julissa: no    8. Annual Wellness Visit Scheduling  Scheduling Status:Scheduled      9. Care Gap Scheduling (Attempt to Schedule EACH Overdue Care Gap!)     There are no preventive care reminders to display for this patient.     Scheduled patient for Annual Wellness Visit    10. Patient was advised: “This is a free wellness visit. The provider will screen for medical conditions to help you stay healthy. If you have other concerns to address you may be asked to discuss these at a separate visit or there may be an additional fee.”     11. Patient was informed to arrive 15 min prior to their scheduled appointment and bring in their medication bottles.

## 2018-06-20 ENCOUNTER — OFFICE VISIT (OUTPATIENT)
Dept: MEDICAL GROUP | Facility: CLINIC | Age: 73
End: 2018-06-20
Payer: MEDICARE

## 2018-06-20 VITALS
TEMPERATURE: 97.2 F | OXYGEN SATURATION: 94 % | WEIGHT: 178 LBS | HEART RATE: 74 BPM | HEIGHT: 62 IN | BODY MASS INDEX: 32.76 KG/M2 | DIASTOLIC BLOOD PRESSURE: 82 MMHG | RESPIRATION RATE: 14 BRPM | SYSTOLIC BLOOD PRESSURE: 124 MMHG

## 2018-06-20 DIAGNOSIS — N18.30 CKD (CHRONIC KIDNEY DISEASE) STAGE 3, GFR 30-59 ML/MIN (HCC): ICD-10-CM

## 2018-06-20 DIAGNOSIS — E03.4 HYPOTHYROIDISM DUE TO ACQUIRED ATROPHY OF THYROID: ICD-10-CM

## 2018-06-20 DIAGNOSIS — Z96.1 HISTORY OF CATARACT REMOVAL WITH INSERTION OF PROSTHETIC LENS: ICD-10-CM

## 2018-06-20 DIAGNOSIS — E78.5 DYSLIPIDEMIA, GOAL LDL BELOW 160: ICD-10-CM

## 2018-06-20 DIAGNOSIS — E66.9 OBESITY (BMI 30.0-34.9): ICD-10-CM

## 2018-06-20 DIAGNOSIS — Z98.49 HISTORY OF CATARACT REMOVAL WITH INSERTION OF PROSTHETIC LENS: ICD-10-CM

## 2018-06-20 DIAGNOSIS — R92.8 ABNORMAL MAMMOGRAM OF LEFT BREAST: ICD-10-CM

## 2018-06-20 DIAGNOSIS — Z00.00 MEDICARE ANNUAL WELLNESS VISIT, SUBSEQUENT: ICD-10-CM

## 2018-06-20 DIAGNOSIS — H40.053 RAISED INTRAOCULAR PRESSURE OF BOTH EYES: ICD-10-CM

## 2018-06-20 PROCEDURE — G0439 PPPS, SUBSEQ VISIT: HCPCS | Performed by: FAMILY MEDICINE

## 2018-06-20 ASSESSMENT — PATIENT HEALTH QUESTIONNAIRE - PHQ9: CLINICAL INTERPRETATION OF PHQ2 SCORE: 0

## 2018-06-20 ASSESSMENT — ACTIVITIES OF DAILY LIVING (ADL): BATHING_REQUIRES_ASSISTANCE: 0

## 2018-06-20 ASSESSMENT — ENCOUNTER SYMPTOMS: GENERAL WELL-BEING: EXCELLENT

## 2018-06-20 ASSESSMENT — PAIN SCALES - GENERAL: PAINLEVEL: 1=MINIMAL PAIN

## 2018-06-20 NOTE — PROGRESS NOTES
Chief Complaint   Patient presents with   • Annual Wellness Visit         HPI:  Vandana is a 73 y.o. here for Medicare Annual Wellness Visit    She is here for her general examination and generally does well overall.      Patient Active Problem List    Diagnosis Date Noted   • CKD (chronic kidney disease) stage 3, GFR 30-59 ml/min 05/21/2015     Priority: Medium   • Obesity (BMI 30.0-34.9) 06/06/2017   • Increased intraocular pressure 06/06/2017   • Simple hepatic cyst 06/06/2017   • History of cataract removal with insertion of prosthetic lens 09/14/2016   • History of pneumonia 02/19/2014   • Hypothyroidism due to acquired atrophy of thyroid    • History of carpal tunnel surgery of right wrist    • MEDICAL HOME 10/23/2012   • Seasonal allergies    • Chronic insomnia    • Dyslipidemia, goal LDL below 160 05/01/2010       Current Outpatient Prescriptions   Medication Sig Dispense Refill   • levothyroxine (SYNTHROID) 50 MCG Tab Take 1 Tab by mouth Every morning on an empty stomach. 90 Tab 2   • estradiol (ESTRACE) 1 MG Tab TAKE ONE TABLET BY MOUTH ONCE DAILY 90 Tab 2   • KRILL OIL PO Take  by mouth every day.     • Multiple Vitamins-Minerals (MULTIVITAMIN PO) Take  by mouth every day.     • Misc Natural Products (OSTEO BI-FLEX JOINT SHIELD PO) Take  by mouth every day.     • timolol (TIMOPTIC) 0.5 % Solution Place 1 Drop in both eyes 2 times a day.     • Multiple Vitamins-Minerals (VISION FORMULA PO) Take  by mouth every day.     • Calcium Carbonate-Vitamin D (CALTRATE 600+D PO) Take  by mouth every day.     • cetirizine (ZYRTEC) 10 MG TABS Take 10 mg by mouth as needed. 30 Tab 3     No current facility-administered medications for this visit.         Patient is taking medications as noted in medication list.  Current supplements as per medication list.     Allergies: Codeine    Current social contact/activities: pt states she spends a lot of time with family and friends and she is very active in hot aug night car show  events      Is patient current with immunizations? Yes.    She  reports that she has never smoked. She has never used smokeless tobacco. She reports that she does not drink alcohol or use drugs.  Counseling given: Yes        DPA/Advanced directive: Patient has Advanced Directive on file.     ROS:    Gait: Uses no assistive device   Ostomy: no   Other tubes: no   Amputations: no   Chronic oxygen use no   Last eye exam March 2018   Wears hearing aids: no   : Reports urinary leakage during the last 6 months that has not interfered at all with their daily activities or sleep.  Denies chest pain, chest pressure, palpitations or exertional shortness of breath.  Denies abdominal pain other than still intermittent bloating.  Denies change in bowel pattern or blood or mucus visible in the stool.        Depression Screening    Little interest or pleasure in doing things?  0 - not at all  Feeling down, depressed, or hopeless? 0 - not at all  Patient Health Questionnaire Score: 0    If depressive symptoms identified deferred to follow up visit unless specifically addressed in assessment and plan.    Interpretation of PHQ-9 Total Score   Score Severity   1-4 No Depression   5-9 Mild Depression   10-14 Moderate Depression   15-19 Moderately Severe Depression   20-27 Severe Depression    Screening for Cognitive Impairment    Three Minute Recall (leader, season, table)  3/3    Arturo clock face with all 12 numbers and set the hands to show 10 past 11.  Yes    If cognitive concerns identified, deferred for follow up unless specifically addressed in assessment and plan.    Fall Risk Assessment    Has the patient had two or more falls in the last year or any fall with injury in the last year?  No  If fall risk identified, deferred for follow up unless specifically addressed in assessment and plan.    Safety Assessment    Throw rugs on floor.  No  Handrails on all stairs.  Yes  Good lighting in all hallways.  Yes  Difficulty hearing.   No  Patient counseled about all safety risks that were identified.    Functional Assessment ADLs    Are there any barriers preventing you from cooking for yourself or meeting nutritional needs?  No.    Are there any barriers preventing you from driving safely or obtaining transportation?  No.    Are there any barriers preventing you from using a telephone or calling for help?  No.    Are there any barriers preventing you from shopping?  No.    Are there any barriers preventing you from taking care of your own finances?  No.    Are there any barriers preventing you from managing your medications?  No.    Are there any barriers preventing you from showering, bathing or dressing yourself?  No.    Are you currently engaging in any exercise or physical activity?  Yes.  Gym 3 days a week   What is your perception of your health?  Excellent.    Health Maintenance Summary                Annual Wellness Visit Overdue 6/7/2018      Done 6/6/2017 Visit Dx: Medicare annual wellness visit, subsequent     Patient has more history with this topic...    MAMMOGRAM Next Due 7/22/2018      Done 1/22/2018 MA-DIAGNOSTIC MAMMO-UNILAT     Patient has more history with this topic...    BONE DENSITY Next Due 6/2/2020      Done 6/2/2015 DS-BONE DENSITY STUDY (DEXA)     Patient has more history with this topic...    COLONOSCOPY Next Due 10/18/2020      Done 10/18/2010 REFERRAL TO GI FOR COLONOSCOPY     Patient has more history with this topic...    IMM DTaP/Tdap/Td Vaccine Next Due 6/6/2027      Done 6/6/2017 Imm Admin: Tdap Vaccine          Patient Care Team:  Esteban Camp M.D. as PCP - General  Neeraj Thurman O.D. as Consulting Physician (Optometry)  DEEPTI Monreal as Consulting Physician (Dermatology)    Social History   Substance Use Topics   • Smoking status: Never Smoker   • Smokeless tobacco: Never Used   • Alcohol use No     Family History   Problem Relation Age of Onset   • Lung Disease Mother      66, heavy smoker,  "emphysema   • Osteoporosis Mother    • No Known Problems Father    • Diabetes Maternal Grandfather    • No Known Problems Daughter    • No Known Problems Daughter    • No Known Problems Daughter    • No Known Problems Son      She  has a past medical history of Anesthesia; Carpal tunnel syndrome of right wrist; Cataract; Chronic insomnia; CKD (chronic kidney disease) stage 3, GFR 30-59 ml/min; Dyslipidemia, goal LDL below 160 (5/2010); History of pneumonia; Hypothyroidism; MEDICAL HOME (10/23/12); Obesity (BMI 30.0-34.9); Pneumonia (1/8/2016); and Seasonal allergies.   Past Surgical History:   Procedure Laterality Date   • CATARACT PHACO WITH IOL Right 9/14/2016    Procedure: CATARACT PHACO WITH IOL KPE;  Surgeon: Wesley Barber M.D.;  Location: SURGERY SAME DAY [facility];  Service:    • CATARACT PHACO WITH IOL Left 9/1/2016    Procedure: CATARACT PHACO WITH IOL;  Surgeon: Wesley Barber M.D.;  Location: SURGERY SAME DAY [facility];  Service:    • CARPAL TUNNEL RELEASE Right 4/15/2016    Procedure: CARPAL TUNNEL RELEASE;  Surgeon: Chalino Urrutia M.D.;  Location: SURGERY [facility];  Service:    • GIANFRANCO BY LAPAROSCOPY  11/11/2011    Performed by HECTOR TALAVERA at SURGERY [facility]   • COLONOSCOPY  10/18/10    Dr. Madhu thomas   • TONSILLECTOMY  1960   • ABDOMINAL HYSTERECTOMY TOTAL  1980s    +ovaries + falopian, uterine fibroids           Exam:     Blood pressure 124/82, pulse 74, temperature 36.2 °C (97.2 °F), resp. rate 14, height 1.575 m (5' 2\"), weight 80.7 kg (178 lb), SpO2 94 %. Body mass index is 32.56 kg/m².    Hearing good.    Dentition good  Alert, oriented in no acute distress.  Eye contact is good, speech goal directed, affect calm  HEENT:   EOMI, PERRLA.   Oropharynx is well hydrated with normal soft palate motion. No exudate or ulcerations noted.   Neck:       Full range of motion, mild posterior cervical spasm. No JVD or carotid bruits appreciated. No cervical adenopathy " appreciated. No thyromegaly or neck masses appreciated.  No retractions appreciated.  Lungs:     Clear to auscultation A&P with good air movement.   Heart:      Regular rate and rhythm normal S1 and S2 without murmur appreciated.  Strong and symmetric radial and DP pulses.  Abd:        Soft, bowel sounds positive, nontender. No bloating noted. No hepatosplenomegaly or mass appreciated. No pulsatile mass appreciated.  Ext:         Extremities show symmetric and full range of motion with normal strength. No cyanosis or clubbing appreciated. No peripheral edema appreciated.  Neuro:    Gait is normal. Patient is lucid, fluent and appropriate. No significant tremor appreciated.  Skin:       Exhibit no rashes, pigmented lesions or ulcerations.      Assessment and Plan. The following treatment and monitoring plan is recommended:    1. Medicare annual wellness visit, subsequent   her medical problems are generally stable   2. Abnormal mammogram of left breast  MA-DIAGNOSTIC MAMMO W/CAD-BILAT    US-BREAST COMPLETE-LEFT discussed the recommended follow-up and orders are placed.   3. Obesity (BMI 30.0-34.9)   discussed her overweight.  She is trying to institute an improved regimen   stable overall, discussed.   4. Hypothyroidism due to acquired atrophy of thyroid  TSH  Patient reports good energy level on the medication. Patient denies insomnia, tremor or change in appetite.  Patient is taking the medication on an empty stomach in the morning and waiting at least 30 minutes before eating.  Last TSH in June last year was slightly above target.  Clinically stable.     5. Dyslipidemia, goal LDL below 160  COMP METABOLIC PANEL    LIPID PROFILE  Patient denies chest pain, chest pressure, palpitations or exertional shortness of breath. Patient is not on a lipid-lowering medication.  Lipid elevation has been moderate with a good risk ratio. Patient is a never smoker. Patient takes no aspirin daily. Patient has no history of  myocardial infarction, stroke or PVD.  The problem is clinically stable.   6. CKD (chronic kidney disease) stage 3, GFR 30-59 ml/min  COMP METABOLIC PANEL Patient has long-standing chronic kidney disease, stage 3  Patient states has been keeping well hydrated and has been trying to walk daily. The patient avoids non-steroidal anti-inflammatory medications.  Patient is due for recheck and lab orders have been placed.  Stable problem.    CBC WITHOUT DIFFERENTIAL   7. Raised intraocular pressure of both eyes   she is following with her new optometrist.  She is now on Travatan.  This has been stable per her recent test.   8. History of cataract removal with insertion of prosthetic lens       Using travatan for raised IOP    Services suggested: No services needed at this time  Health Care Screening recommendations as per orders if indicated.  Referrals offered: PT/OT/Nutrition counseling/Behavioral Health/Smoking cessation as per orders if indicated.    Discussion today about general wellness and lifestyle habits:  discussed  · Prevent falls and reduce trip hazards; Cautioned about securing or removing rugs.  · Have a working fire alarm and carbon monoxide detector; has  · Engage in regular physical activity and social activities       Follow-up: Return in about 6 months (around 12/20/2018), or if symptoms worsen or fail to improve.

## 2018-06-22 ENCOUNTER — HOSPITAL ENCOUNTER (OUTPATIENT)
Dept: LAB | Facility: MEDICAL CENTER | Age: 73
End: 2018-06-22
Attending: FAMILY MEDICINE
Payer: MEDICARE

## 2018-06-22 DIAGNOSIS — E78.5 DYSLIPIDEMIA, GOAL LDL BELOW 160: ICD-10-CM

## 2018-06-22 DIAGNOSIS — N18.30 CKD (CHRONIC KIDNEY DISEASE) STAGE 3, GFR 30-59 ML/MIN (HCC): ICD-10-CM

## 2018-06-22 DIAGNOSIS — E03.4 HYPOTHYROIDISM DUE TO ACQUIRED ATROPHY OF THYROID: ICD-10-CM

## 2018-06-22 LAB
ALBUMIN SERPL BCP-MCNC: 3.9 G/DL (ref 3.2–4.9)
ALBUMIN/GLOB SERPL: 1.6 G/DL
ALP SERPL-CCNC: 61 U/L (ref 30–99)
ALT SERPL-CCNC: 15 U/L (ref 2–50)
ANION GAP SERPL CALC-SCNC: 9 MMOL/L (ref 0–11.9)
AST SERPL-CCNC: 17 U/L (ref 12–45)
BILIRUB SERPL-MCNC: 0.5 MG/DL (ref 0.1–1.5)
BUN SERPL-MCNC: 21 MG/DL (ref 8–22)
CALCIUM SERPL-MCNC: 9.4 MG/DL (ref 8.5–10.5)
CHLORIDE SERPL-SCNC: 108 MMOL/L (ref 96–112)
CHOLEST SERPL-MCNC: 250 MG/DL (ref 100–199)
CO2 SERPL-SCNC: 24 MMOL/L (ref 20–33)
CREAT SERPL-MCNC: 1.11 MG/DL (ref 0.5–1.4)
ERYTHROCYTE [DISTWIDTH] IN BLOOD BY AUTOMATED COUNT: 44.9 FL (ref 35.9–50)
GLOBULIN SER CALC-MCNC: 2.5 G/DL (ref 1.9–3.5)
GLUCOSE SERPL-MCNC: 95 MG/DL (ref 65–99)
HCT VFR BLD AUTO: 41.3 % (ref 37–47)
HDLC SERPL-MCNC: 73 MG/DL
HGB BLD-MCNC: 14 G/DL (ref 12–16)
LDLC SERPL CALC-MCNC: 154 MG/DL
MCH RBC QN AUTO: 33 PG (ref 27–33)
MCHC RBC AUTO-ENTMCNC: 33.9 G/DL (ref 33.6–35)
MCV RBC AUTO: 97.4 FL (ref 81.4–97.8)
PLATELET # BLD AUTO: 220 K/UL (ref 164–446)
PMV BLD AUTO: 11.8 FL (ref 9–12.9)
POTASSIUM SERPL-SCNC: 4.3 MMOL/L (ref 3.6–5.5)
PROT SERPL-MCNC: 6.4 G/DL (ref 6–8.2)
RBC # BLD AUTO: 4.24 M/UL (ref 4.2–5.4)
SODIUM SERPL-SCNC: 141 MMOL/L (ref 135–145)
TRIGL SERPL-MCNC: 117 MG/DL (ref 0–149)
TSH SERPL DL<=0.005 MIU/L-ACNC: 3.9 UIU/ML (ref 0.38–5.33)
WBC # BLD AUTO: 6.1 K/UL (ref 4.8–10.8)

## 2018-06-22 PROCEDURE — 36415 COLL VENOUS BLD VENIPUNCTURE: CPT

## 2018-06-22 PROCEDURE — 84443 ASSAY THYROID STIM HORMONE: CPT

## 2018-06-22 PROCEDURE — 80061 LIPID PANEL: CPT

## 2018-06-22 PROCEDURE — 80053 COMPREHEN METABOLIC PANEL: CPT

## 2018-06-22 PROCEDURE — 85027 COMPLETE CBC AUTOMATED: CPT

## 2018-08-13 ENCOUNTER — HOSPITAL ENCOUNTER (OUTPATIENT)
Dept: RADIOLOGY | Facility: MEDICAL CENTER | Age: 73
End: 2018-08-13
Attending: FAMILY MEDICINE
Payer: MEDICARE

## 2018-08-13 DIAGNOSIS — R92.8 ABNORMAL MAMMOGRAM OF LEFT BREAST: ICD-10-CM

## 2018-08-13 DIAGNOSIS — Z98.890 STATUS POST BREAST BIOPSY: ICD-10-CM

## 2018-08-13 PROCEDURE — G0279 TOMOSYNTHESIS, MAMMO: HCPCS | Mod: LT

## 2018-12-16 DIAGNOSIS — E03.4 HYPOTHYROIDISM DUE TO ACQUIRED ATROPHY OF THYROID: ICD-10-CM

## 2018-12-16 RX ORDER — LEVOTHYROXINE SODIUM 0.05 MG/1
TABLET ORAL
Qty: 90 TAB | Refills: 2 | Status: SHIPPED | OUTPATIENT
Start: 2018-12-16 | End: 2019-09-15 | Stop reason: SDUPTHER

## 2019-01-12 ENCOUNTER — OFFICE VISIT (OUTPATIENT)
Dept: URGENT CARE | Facility: CLINIC | Age: 74
End: 2019-01-12
Payer: MEDICARE

## 2019-01-12 VITALS
SYSTOLIC BLOOD PRESSURE: 130 MMHG | BODY MASS INDEX: 28.17 KG/M2 | DIASTOLIC BLOOD PRESSURE: 62 MMHG | OXYGEN SATURATION: 94 % | HEART RATE: 83 BPM | HEIGHT: 64 IN | WEIGHT: 165 LBS | TEMPERATURE: 98.7 F

## 2019-01-12 DIAGNOSIS — J20.9 ACUTE BRONCHITIS, UNSPECIFIED ORGANISM: ICD-10-CM

## 2019-01-12 DIAGNOSIS — R05.9 COUGH: ICD-10-CM

## 2019-01-12 PROCEDURE — 99214 OFFICE O/P EST MOD 30 MIN: CPT | Performed by: FAMILY MEDICINE

## 2019-01-12 RX ORDER — DOXYCYCLINE HYCLATE 100 MG
100 TABLET ORAL 2 TIMES DAILY
Qty: 20 TAB | Refills: 0 | Status: SHIPPED | OUTPATIENT
Start: 2019-01-12 | End: 2019-01-22

## 2019-01-12 RX ORDER — INFLUENZA A VIRUS A/MICHIGAN/45/2015 X-275 (H1N1) ANTIGEN (FORMALDEHYDE INACTIVATED), INFLUENZA A VIRUS A/SINGAPORE/INFIMH-16-0019/2016 IVR-186 (H3N2) ANTIGEN (FORMALDEHYDE INACTIVATED), AND INFLUENZA B VIRUS B/MARYLAND/15/2016 BX-69A (A B/COLORADO/6/2017-LIKE VIRUS) ANTIGEN (FORMALDEHYDE INACTIVATED) 60; 60; 60 UG/.5ML; UG/.5ML; UG/.5ML
INJECTION, SUSPENSION INTRAMUSCULAR
Refills: 0 | COMMUNITY
Start: 2018-10-17 | End: 2019-10-08

## 2019-01-12 RX ORDER — BENZONATATE 100 MG/1
100 CAPSULE ORAL 3 TIMES DAILY PRN
Qty: 60 CAP | Refills: 0 | Status: SHIPPED | OUTPATIENT
Start: 2019-01-12 | End: 2019-10-08

## 2019-01-12 ASSESSMENT — ENCOUNTER SYMPTOMS
CHILLS: 1
CARDIOVASCULAR NEGATIVE: 1
FEVER: 0
COUGH: 1
WHEEZING: 0
SORE THROAT: 1
SHORTNESS OF BREATH: 0
EYES NEGATIVE: 1
NEUROLOGICAL NEGATIVE: 1
MUSCULOSKELETAL NEGATIVE: 1

## 2019-01-12 ASSESSMENT — COPD QUESTIONNAIRES: COPD: 0

## 2019-01-12 NOTE — PROGRESS NOTES
"Subjective:      Vandana Beaver is a 73 y.o. female who presents with Cold Symptoms (x1 week. Sore throat, cough, ear pain.)            Cough   This is a new problem. The current episode started in the past 7 days. The problem has been gradually worsening. The cough is productive of sputum. Associated symptoms include chills, ear pain, nasal congestion and a sore throat. Pertinent negatives include no fever, shortness of breath or wheezing. Nothing aggravates the symptoms. She has tried OTC cough suppressant for the symptoms. The treatment provided mild relief. There is no history of asthma or COPD.       Review of Systems   Constitutional: Positive for chills. Negative for fever.   HENT: Positive for ear pain and sore throat.    Eyes: Negative.    Respiratory: Positive for cough. Negative for shortness of breath and wheezing.    Cardiovascular: Negative.    Musculoskeletal: Negative.    Skin: Negative.    Neurological: Negative.           Objective:     /62   Pulse 83   Temp 37.1 °C (98.7 °F)   Ht 1.626 m (5' 4\")   Wt 74.8 kg (165 lb)   SpO2 94%   BMI 28.32 kg/m²      Physical Exam   Constitutional: She is oriented to person, place, and time. She appears well-developed and well-nourished.  Non-toxic appearance. She does not have a sickly appearance. She does not appear ill. No distress.   HENT:   Head: Normocephalic and atraumatic.   Right Ear: Tympanic membrane, external ear and ear canal normal.   Left Ear: Tympanic membrane, external ear and ear canal normal.   Nose: Mucosal edema present. Right sinus exhibits no maxillary sinus tenderness. Left sinus exhibits no maxillary sinus tenderness.   Mouth/Throat: Uvula is midline and oropharynx is clear and moist. No oral lesions. No trismus in the jaw. No uvula swelling. No oropharyngeal exudate, posterior oropharyngeal edema, posterior oropharyngeal erythema or tonsillar abscesses. No tonsillar exudate.   Eyes: Conjunctivae are normal.   Neck: Neck " supple.   Cardiovascular: Normal rate and regular rhythm.  Exam reveals no gallop and no friction rub.    No murmur heard.  Pulmonary/Chest: Effort normal. No stridor. No respiratory distress. She has no wheezes. She has no rales.   Lymphadenopathy:     She has no cervical adenopathy.   Neurological: She is alert and oriented to person, place, and time.   Skin: Skin is warm. No rash noted. She is not diaphoretic. No erythema. No pallor.               Assessment/Plan:     ASSESSMENT:PLAN:  1. Acute bronchitis, unspecified organism  - doxycycline (VIBRAMYCIN) 100 MG Tab; Take 1 Tab by mouth 2 times a day for 10 days.  Dispense: 20 Tab; Refill: 0    2. Cough  - benzonatate (TESSALON) 100 MG Cap; Take 1 Cap by mouth 3 times a day as needed for Cough.  Dispense: 60 Cap; Refill: 0      Plan per orders and instructions  Warning signs reviewed

## 2019-01-12 NOTE — PATIENT INSTRUCTIONS
Follow up if not significantly improved as expected in 7 days, sooner if any worsening or new symptoms    For more information see the handout     Bronchitis  Bronchitis is a problem of the air tubes leading to your lungs. This problem makes it hard for air to get in and out of the lungs. You may cough a lot because your air tubes are narrow. Going without care can cause lasting (chronic) bronchitis.  HOME CARE   · Drink enough fluids to keep your pee (urine) clear or pale yellow.  · Use a cool mist humidifier.  · Quit smoking if you smoke. If you keep smoking, the bronchitis might not get better.  · Only take medicine as told by your doctor.  GET HELP RIGHT AWAY IF:   · Coughing keeps you awake.  · You start to wheeze.  · You become more sick or weak.  · You have a hard time breathing or get short of breath.  · You cough up blood.  · Coughing lasts more than 2 weeks.  · You have a fever.  · Your baby is older than 3 months with a rectal temperature of 102° F (38.9° C) or higher.  · Your baby is 3 months old or younger with a rectal temperature of 100.4° F (38° C) or higher.  MAKE SURE YOU:  · Understand these instructions.  · Will watch your condition.  · Will get help right away if you are not doing well or get worse.  Document Released: 06/05/2009 Document Revised: 03/11/2013 Document Reviewed: 11/19/2010  ExitCare® Patient Information ©2014 [employer], [employer].

## 2019-01-31 ENCOUNTER — HOSPITAL ENCOUNTER (OUTPATIENT)
Dept: RADIOLOGY | Facility: MEDICAL CENTER | Age: 74
End: 2019-01-31
Attending: FAMILY MEDICINE
Payer: MEDICARE

## 2019-01-31 DIAGNOSIS — Z12.39 SCREENING FOR BREAST CANCER: ICD-10-CM

## 2019-01-31 PROCEDURE — 77063 BREAST TOMOSYNTHESIS BI: CPT

## 2019-02-24 RX ORDER — ESTRADIOL 1 MG/1
TABLET ORAL
Qty: 90 TAB | Refills: 2 | Status: SHIPPED | OUTPATIENT
Start: 2019-02-24 | End: 2019-11-25 | Stop reason: SDUPTHER

## 2019-07-19 ENCOUNTER — PATIENT OUTREACH (OUTPATIENT)
Dept: HEALTH INFORMATION MANAGEMENT | Facility: OTHER | Age: 74
End: 2019-07-19

## 2019-09-17 ENCOUNTER — HOSPITAL ENCOUNTER (OUTPATIENT)
Dept: LAB | Facility: MEDICAL CENTER | Age: 74
End: 2019-09-17
Attending: FAMILY MEDICINE
Payer: MEDICARE

## 2019-09-17 DIAGNOSIS — E78.5 DYSLIPIDEMIA, GOAL LDL BELOW 160: ICD-10-CM

## 2019-09-17 DIAGNOSIS — E03.4 HYPOTHYROIDISM DUE TO ACQUIRED ATROPHY OF THYROID: ICD-10-CM

## 2019-09-17 DIAGNOSIS — N18.30 CKD (CHRONIC KIDNEY DISEASE) STAGE 3, GFR 30-59 ML/MIN (HCC): ICD-10-CM

## 2019-09-17 LAB
ALBUMIN SERPL BCP-MCNC: 4 G/DL (ref 3.2–4.9)
ALBUMIN/GLOB SERPL: 1.5 G/DL
ALP SERPL-CCNC: 54 U/L (ref 30–99)
ALT SERPL-CCNC: 10 U/L (ref 2–50)
ANION GAP SERPL CALC-SCNC: 7 MMOL/L (ref 0–11.9)
AST SERPL-CCNC: 13 U/L (ref 12–45)
BILIRUB SERPL-MCNC: 0.5 MG/DL (ref 0.1–1.5)
BUN SERPL-MCNC: 20 MG/DL (ref 8–22)
CALCIUM SERPL-MCNC: 9 MG/DL (ref 8.5–10.5)
CHLORIDE SERPL-SCNC: 107 MMOL/L (ref 96–112)
CHOLEST SERPL-MCNC: 268 MG/DL (ref 100–199)
CO2 SERPL-SCNC: 27 MMOL/L (ref 20–33)
CREAT SERPL-MCNC: 1.08 MG/DL (ref 0.5–1.4)
FASTING STATUS PATIENT QL REPORTED: NORMAL
GLOBULIN SER CALC-MCNC: 2.7 G/DL (ref 1.9–3.5)
GLUCOSE SERPL-MCNC: 99 MG/DL (ref 65–99)
HDLC SERPL-MCNC: 80 MG/DL
LDLC SERPL CALC-MCNC: 151 MG/DL
POTASSIUM SERPL-SCNC: 4.2 MMOL/L (ref 3.6–5.5)
PROT SERPL-MCNC: 6.7 G/DL (ref 6–8.2)
SODIUM SERPL-SCNC: 141 MMOL/L (ref 135–145)
TRIGL SERPL-MCNC: 185 MG/DL (ref 0–149)
TSH SERPL DL<=0.005 MIU/L-ACNC: 3.4 UIU/ML (ref 0.38–5.33)

## 2019-09-17 PROCEDURE — 36415 COLL VENOUS BLD VENIPUNCTURE: CPT

## 2019-09-17 PROCEDURE — 84443 ASSAY THYROID STIM HORMONE: CPT

## 2019-09-17 PROCEDURE — 80053 COMPREHEN METABOLIC PANEL: CPT

## 2019-09-17 PROCEDURE — 80061 LIPID PANEL: CPT

## 2019-10-08 ENCOUNTER — OFFICE VISIT (OUTPATIENT)
Dept: MEDICAL GROUP | Facility: MEDICAL CENTER | Age: 74
End: 2019-10-08
Payer: MEDICARE

## 2019-10-08 VITALS
OXYGEN SATURATION: 96 % | WEIGHT: 175.8 LBS | BODY MASS INDEX: 30.01 KG/M2 | HEART RATE: 72 BPM | TEMPERATURE: 98.8 F | HEIGHT: 64 IN | DIASTOLIC BLOOD PRESSURE: 88 MMHG | SYSTOLIC BLOOD PRESSURE: 138 MMHG

## 2019-10-08 DIAGNOSIS — F51.04 CHRONIC INSOMNIA: ICD-10-CM

## 2019-10-08 DIAGNOSIS — Z96.1 HISTORY OF CATARACT REMOVAL WITH INSERTION OF PROSTHETIC LENS: ICD-10-CM

## 2019-10-08 DIAGNOSIS — E03.4 HYPOTHYROIDISM DUE TO ACQUIRED ATROPHY OF THYROID: ICD-10-CM

## 2019-10-08 DIAGNOSIS — E66.9 OBESITY (BMI 30.0-34.9): ICD-10-CM

## 2019-10-08 DIAGNOSIS — Z98.49 HISTORY OF CATARACT REMOVAL WITH INSERTION OF PROSTHETIC LENS: ICD-10-CM

## 2019-10-08 DIAGNOSIS — E78.5 DYSLIPIDEMIA, GOAL LDL BELOW 160: ICD-10-CM

## 2019-10-08 DIAGNOSIS — Z98.890 HISTORY OF CARPAL TUNNEL SURGERY OF RIGHT WRIST: ICD-10-CM

## 2019-10-08 DIAGNOSIS — N18.30 CKD (CHRONIC KIDNEY DISEASE) STAGE 3, GFR 30-59 ML/MIN (HCC): ICD-10-CM

## 2019-10-08 DIAGNOSIS — H40.053 RAISED INTRAOCULAR PRESSURE OF BOTH EYES: ICD-10-CM

## 2019-10-08 DIAGNOSIS — Z00.00 MEDICARE ANNUAL WELLNESS VISIT, SUBSEQUENT: ICD-10-CM

## 2019-10-08 PROCEDURE — G0439 PPPS, SUBSEQ VISIT: HCPCS | Performed by: FAMILY MEDICINE

## 2019-10-08 RX ORDER — ATORVASTATIN CALCIUM 10 MG/1
10 TABLET, FILM COATED ORAL EVERY EVENING
Qty: 100 TAB | Refills: 2 | Status: SHIPPED | OUTPATIENT
Start: 2019-10-08 | End: 2020-06-09 | Stop reason: SDUPTHER

## 2019-10-08 RX ORDER — TEMAZEPAM 15 MG/1
15 CAPSULE ORAL NIGHTLY PRN
Qty: 15 CAP | Refills: 5 | Status: SHIPPED | OUTPATIENT
Start: 2019-10-08 | End: 2020-04-23 | Stop reason: SDUPTHER

## 2019-10-08 RX ORDER — ATORVASTATIN CALCIUM 10 MG/1
10 TABLET, FILM COATED ORAL EVERY EVENING
Qty: 90 TAB | Refills: 2 | Status: SHIPPED | OUTPATIENT
Start: 2019-10-08 | End: 2019-10-08 | Stop reason: SDUPTHER

## 2019-10-08 ASSESSMENT — PATIENT HEALTH QUESTIONNAIRE - PHQ9: CLINICAL INTERPRETATION OF PHQ2 SCORE: 0

## 2019-10-08 ASSESSMENT — ACTIVITIES OF DAILY LIVING (ADL): BATHING_REQUIRES_ASSISTANCE: 0

## 2019-10-30 NOTE — PROGRESS NOTES
1. HealthConnect Verified: yes    2. Verify PCP: yes    3. Review and add  to Care Team: yes    4. Care Gap Scheduling (Attempt to Schedule EACH Overdue Care Gap!)        Called pt and I introduced myself as her SCP . Unable to complete info, pt was busy.

## 2019-11-25 RX ORDER — ESTRADIOL 1 MG/1
TABLET ORAL
Qty: 100 TAB | Refills: 2 | Status: SHIPPED | OUTPATIENT
Start: 2019-11-25 | End: 2020-06-09 | Stop reason: SDUPTHER

## 2020-01-17 ENCOUNTER — OFFICE VISIT (OUTPATIENT)
Dept: URGENT CARE | Facility: CLINIC | Age: 75
End: 2020-01-17
Payer: MEDICARE

## 2020-01-17 VITALS
DIASTOLIC BLOOD PRESSURE: 64 MMHG | WEIGHT: 166 LBS | HEART RATE: 86 BPM | BODY MASS INDEX: 28.34 KG/M2 | TEMPERATURE: 97.8 F | OXYGEN SATURATION: 95 % | HEIGHT: 64 IN | SYSTOLIC BLOOD PRESSURE: 116 MMHG | RESPIRATION RATE: 18 BRPM

## 2020-01-17 DIAGNOSIS — J06.9 URI WITH COUGH AND CONGESTION: ICD-10-CM

## 2020-01-17 DIAGNOSIS — H69.91 EUSTACHIAN TUBE DYSFUNCTION, RIGHT: ICD-10-CM

## 2020-01-17 PROCEDURE — 99214 OFFICE O/P EST MOD 30 MIN: CPT | Performed by: PHYSICIAN ASSISTANT

## 2020-01-17 RX ORDER — AMOXICILLIN AND CLAVULANATE POTASSIUM 875; 125 MG/1; MG/1
1 TABLET, FILM COATED ORAL 2 TIMES DAILY
Qty: 14 TAB | Refills: 0 | Status: SHIPPED | OUTPATIENT
Start: 2020-01-17 | End: 2020-01-24

## 2020-01-17 RX ORDER — FLUTICASONE PROPIONATE 50 MCG
1 SPRAY, SUSPENSION (ML) NASAL DAILY
Qty: 1 BOTTLE | Refills: 0 | Status: SHIPPED | OUTPATIENT
Start: 2020-01-17 | End: 2022-11-17

## 2020-01-17 RX ORDER — ALBUTEROL SULFATE 90 UG/1
2 AEROSOL, METERED RESPIRATORY (INHALATION) EVERY 6 HOURS PRN
Qty: 8.5 G | Refills: 0 | Status: SHIPPED | OUTPATIENT
Start: 2020-01-17 | End: 2020-08-25

## 2020-01-17 RX ORDER — IPRATROPIUM BROMIDE AND ALBUTEROL SULFATE 2.5; .5 MG/3ML; MG/3ML
3 SOLUTION RESPIRATORY (INHALATION) ONCE
OUTPATIENT
Start: 2020-01-17 | End: 2020-01-18

## 2020-01-17 RX ORDER — BENZONATATE 100 MG/1
100 CAPSULE ORAL 3 TIMES DAILY PRN
Qty: 30 CAP | Refills: 0 | Status: SHIPPED | OUTPATIENT
Start: 2020-01-17 | End: 2020-08-25

## 2020-01-17 ASSESSMENT — ENCOUNTER SYMPTOMS
NAUSEA: 0
HEADACHES: 1
WHEEZING: 0
DIZZINESS: 0
SPUTUM PRODUCTION: 0
COUGH: 1
VOMITING: 0
RHINORRHEA: 1
PALPITATIONS: 0
EYE REDNESS: 0
CHILLS: 0
SORE THROAT: 1
FEVER: 0
ABDOMINAL PAIN: 0
DIARRHEA: 0
MYALGIAS: 0
EYE DISCHARGE: 0
SHORTNESS OF BREATH: 1

## 2020-01-17 NOTE — PROGRESS NOTES
"Subjective:      Vandana Beaver is a 74 y.o. female who presents with Cough (x2 weeks \"barking, hacking cough\" ) and Congestion      URI    This is a new problem. The current episode started 1 to 4 weeks ago (2.5 weeks). The problem has been gradually worsening. There has been no fever. Associated symptoms include congestion, coughing, ear pain, headaches, a plugged ear sensation, rhinorrhea, sneezing and a sore throat. Pertinent negatives include no abdominal pain, chest pain, diarrhea, nausea, rash, vomiting or wheezing. She has tried decongestant and increased fluids (Robitussin) for the symptoms. The treatment provided no relief.       Review of Systems   Constitutional: Positive for malaise/fatigue. Negative for chills and fever.   HENT: Positive for congestion, ear pain, rhinorrhea, sneezing and sore throat.    Eyes: Negative for discharge and redness.   Respiratory: Positive for cough and shortness of breath. Negative for sputum production and wheezing.    Cardiovascular: Negative for chest pain and palpitations.   Gastrointestinal: Negative for abdominal pain, diarrhea, nausea and vomiting.   Musculoskeletal: Negative for myalgias.   Skin: Negative for rash.   Neurological: Positive for headaches. Negative for dizziness.       PMH:  has a past medical history of Anesthesia, Carpal tunnel syndrome of right wrist, Cataract, Chronic insomnia, CKD (chronic kidney disease) stage 3, GFR 30-59 ml/min ([facility]), Dyslipidemia, goal LDL below 160 (5/2010), History of pneumonia, Hypothyroidism, MEDICAL HOME (10/23/12), Obesity (BMI 30.0-34.9), Pneumonia (1/8/2016), and Seasonal allergies.  MEDS:   Current Outpatient Medications:   •  amoxicillin-clavulanate (AUGMENTIN) 875-125 MG Tab, Take 1 Tab by mouth 2 times a day for 7 days., Disp: 14 Tab, Rfl: 0  •  albuterol 108 (90 Base) MCG/ACT Aero Soln inhalation aerosol, Inhale 2 Puffs by mouth every 6 hours as needed for Shortness of Breath., Disp: 8.5 g, Rfl: 0  •  " benzonatate (TESSALON) 100 MG Cap, Take 1 Cap by mouth 3 times a day as needed for Cough., Disp: 30 Cap, Rfl: 0  •  fluticasone (FLONASE) 50 MCG/ACT nasal spray, Spray 1 Spray in nose every day., Disp: 1 Bottle, Rfl: 0  •  estradiol (ESTRACE) 1 MG Tab, TAKE 1 TABLET BY MOUTH ONCE DAILY, Disp: 100 Tab, Rfl: 2  •  temazepam (RESTORIL) 15 MG Cap, Take 1 Cap by mouth at bedtime as needed for Sleep for up to 180 days., Disp: 15 Cap, Rfl: 5  •  atorvastatin (LIPITOR) 10 MG Tab, Take 1 Tab by mouth every evening., Disp: 100 Tab, Rfl: 2  •  levothyroxine (SYNTHROID) 50 MCG Tab, TAKE 1 TABLET BY MOUTH ONCE DAILY IN THE MORNING ON AN EMPTY STOMACH, Disp: 90 Tab, Rfl: 0  •  timolol (TIMOPTIC) 0.5 % Solution, Place 1 Drop in both eyes 2 times a day., Disp: , Rfl:   •  Calcium Carbonate-Vitamin D (CALTRATE 600+D PO), Take  by mouth every day., Disp: , Rfl:   •  KRILL OIL PO, Take  by mouth every day., Disp: , Rfl:   •  Multiple Vitamins-Minerals (MULTIVITAMIN PO), Take  by mouth every day., Disp: , Rfl:   •  Misc Natural Products (OSTEO BI-FLEX JOINT SHIELD PO), Take  by mouth every day., Disp: , Rfl:   •  cetirizine (ZYRTEC) 10 MG TABS, Take 10 mg by mouth as needed., Disp: 30 Tab, Rfl: 3    Current Facility-Administered Medications:   •  ipratropium-albuterol (DUONEB) nebulizer solution, 3 mL, Nebulization, Once, PRIMO SmartA.-CCait  ALLERGIES:   Allergies   Allergen Reactions   • Codeine Vomiting     SURGHX:   Past Surgical History:   Procedure Laterality Date   • US-NEEDLE CORE BX-BREAST PANEL Left 02/2018   • CATARACT PHACO WITH IOL Right 9/14/2016    Procedure: CATARACT PHACO WITH IOL KPE;  Surgeon: Wesley Barber M.D.;  Location: SURGERY SAME DAY [facility];  Service:    • CATARACT PHACO WITH IOL Left 9/1/2016    Procedure: CATARACT PHACO WITH IOL;  Surgeon: Wesley Barber M.D.;  Location: SURGERY SAME DAY [facility];  Service:    • CARPAL TUNNEL RELEASE Right 4/15/2016    Procedure: CARPAL TUNNEL RELEASE;   "Surgeon: Chalino Urrutia M.D.;  Location: SURGERY [facility];  Service:    • GIANFRANCO BY LAPAROSCOPY  11/11/2011    Performed by HECTOR TALAVERA at SURGERY [facility]   • COLONOSCOPY  10/18/10    martha, Dr. Leung   • TONSILLECTOMY  1960   • ABDOMINAL HYSTERECTOMY TOTAL  1980s    +ovaries + falopian, uterine fibroids     SOCHX:  reports that she has never smoked. She has never used smokeless tobacco. She reports that she does not drink alcohol or use drugs.  FH: Family history was reviewed, no pertinent findings to report     Objective:     /64 (BP Location: Left arm, Patient Position: Sitting, BP Cuff Size: Adult long)   Pulse 86   Temp 36.6 °C (97.8 °F) (Temporal)   Resp 18   Ht 1.626 m (5' 4\")   Wt 75.3 kg (166 lb)   SpO2 95%   BMI 28.49 kg/m²      Physical Exam  Constitutional:       Appearance: She is well-developed.   HENT:      Head: Normocephalic and atraumatic.      Right Ear: External ear normal. Tympanic membrane is erythematous and bulging.      Left Ear: Tympanic membrane, ear canal and external ear normal.      Nose: Mucosal edema, congestion and rhinorrhea present. Rhinorrhea is clear.      Right Sinus: Maxillary sinus tenderness and frontal sinus tenderness present.      Left Sinus: Maxillary sinus tenderness and frontal sinus tenderness present.      Mouth/Throat:      Lips: Pink.      Mouth: Mucous membranes are moist.      Pharynx: Oropharynx is clear.   Eyes:      Conjunctiva/sclera: Conjunctivae normal.      Pupils: Pupils are equal, round, and reactive to light.   Neck:      Musculoskeletal: Normal range of motion.   Cardiovascular:      Rate and Rhythm: Normal rate and regular rhythm.      Heart sounds: Normal heart sounds. No murmur.   Pulmonary:      Effort: Pulmonary effort is normal.      Breath sounds: Normal breath sounds. No decreased breath sounds, wheezing, rhonchi or rales.   Lymphadenopathy:      Cervical: No cervical adenopathy.   Skin:     General: Skin is " warm and dry.      Capillary Refill: Capillary refill takes less than 2 seconds.   Neurological:      Mental Status: She is alert and oriented to person, place, and time.   Psychiatric:         Behavior: Behavior normal.         Judgment: Judgment normal.            Assessment/Plan:       1. URI with cough and congestion  - ipratropium-albuterol (DUONEB) nebulizer solution  - albuterol 108 (90 Base) MCG/ACT Aero Soln inhalation aerosol; Inhale 2 Puffs by mouth every 6 hours as needed for Shortness of Breath.  Dispense: 8.5 g; Refill: 0  - benzonatate (TESSALON) 100 MG Cap; Take 1 Cap by mouth 3 times a day as needed for Cough.  Dispense: 30 Cap; Refill: 0  - fluticasone (FLONASE) 50 MCG/ACT nasal spray; Spray 1 Spray in nose every day.  Dispense: 1 Bottle; Refill: 0    2. Eustachian tube dysfunction, right  - amoxicillin-clavulanate (AUGMENTIN) 875-125 MG Tab; Take 1 Tab by mouth 2 times a day for 7 days.  Dispense: 14 Tab; Refill: 0  - fluticasone (FLONASE) 50 MCG/ACT nasal spray; Spray 1 Spray in nose every day.  Dispense: 1 Bottle; Refill: 0          Differential Diagnosis, natural history, and supportive care discussed. Return to the Urgent Care or follow up with your PCP if symptoms fail to resolve, or for any new or worsening symptoms. Emergency room precautions discussed. Patient and/or family appears understanding of information.

## 2020-01-27 DIAGNOSIS — E03.4 HYPOTHYROIDISM DUE TO ACQUIRED ATROPHY OF THYROID: ICD-10-CM

## 2020-01-28 RX ORDER — LEVOTHYROXINE SODIUM 0.05 MG/1
TABLET ORAL
Qty: 90 TAB | Refills: 2 | Status: SHIPPED | OUTPATIENT
Start: 2020-01-28 | End: 2020-12-09 | Stop reason: SDUPTHER

## 2020-02-27 ENCOUNTER — HOSPITAL ENCOUNTER (OUTPATIENT)
Dept: RADIOLOGY | Facility: MEDICAL CENTER | Age: 75
End: 2020-02-27
Attending: FAMILY MEDICINE
Payer: MEDICARE

## 2020-02-27 DIAGNOSIS — Z12.31 VISIT FOR SCREENING MAMMOGRAM: ICD-10-CM

## 2020-02-27 PROCEDURE — 77067 SCR MAMMO BI INCL CAD: CPT

## 2020-04-17 ENCOUNTER — TELEPHONE (OUTPATIENT)
Dept: MEDICAL GROUP | Facility: MEDICAL CENTER | Age: 75
End: 2020-04-17

## 2020-04-23 ENCOUNTER — TELEPHONE (OUTPATIENT)
Dept: MEDICAL GROUP | Facility: MEDICAL CENTER | Age: 75
End: 2020-04-23

## 2020-04-23 DIAGNOSIS — F51.04 CHRONIC INSOMNIA: ICD-10-CM

## 2020-04-23 RX ORDER — TEMAZEPAM 15 MG/1
15 CAPSULE ORAL NIGHTLY PRN
Qty: 15 CAP | Refills: 5 | Status: SHIPPED | OUTPATIENT
Start: 2020-04-23 | End: 2020-05-23

## 2020-06-09 ENCOUNTER — OFFICE VISIT (OUTPATIENT)
Dept: MEDICAL GROUP | Facility: MEDICAL CENTER | Age: 75
End: 2020-06-09
Payer: MEDICARE

## 2020-06-09 VITALS
OXYGEN SATURATION: 94 % | HEIGHT: 64 IN | DIASTOLIC BLOOD PRESSURE: 78 MMHG | TEMPERATURE: 97.6 F | WEIGHT: 175 LBS | BODY MASS INDEX: 29.88 KG/M2 | RESPIRATION RATE: 14 BRPM | SYSTOLIC BLOOD PRESSURE: 110 MMHG | HEART RATE: 60 BPM

## 2020-06-09 DIAGNOSIS — E66.9 OBESITY (BMI 30.0-34.9): ICD-10-CM

## 2020-06-09 DIAGNOSIS — H40.053 RAISED INTRAOCULAR PRESSURE OF BOTH EYES: ICD-10-CM

## 2020-06-09 DIAGNOSIS — Z00.00 MEDICARE ANNUAL WELLNESS VISIT, SUBSEQUENT: ICD-10-CM

## 2020-06-09 DIAGNOSIS — E78.5 DYSLIPIDEMIA, GOAL LDL BELOW 160: ICD-10-CM

## 2020-06-09 DIAGNOSIS — E03.4 HYPOTHYROIDISM DUE TO ACQUIRED ATROPHY OF THYROID: ICD-10-CM

## 2020-06-09 DIAGNOSIS — F51.04 CHRONIC INSOMNIA: ICD-10-CM

## 2020-06-09 DIAGNOSIS — N18.30 CKD (CHRONIC KIDNEY DISEASE) STAGE 3, GFR 30-59 ML/MIN: ICD-10-CM

## 2020-06-09 DIAGNOSIS — Z96.1 HISTORY OF CATARACT REMOVAL WITH INSERTION OF PROSTHETIC LENS: ICD-10-CM

## 2020-06-09 DIAGNOSIS — N95.1 MENOPAUSAL SYMPTOMS: ICD-10-CM

## 2020-06-09 DIAGNOSIS — Z98.49 HISTORY OF CATARACT REMOVAL WITH INSERTION OF PROSTHETIC LENS: ICD-10-CM

## 2020-06-09 PROCEDURE — G0439 PPPS, SUBSEQ VISIT: HCPCS | Performed by: FAMILY MEDICINE

## 2020-06-09 PROCEDURE — 8041 PR SCP AHA: Performed by: FAMILY MEDICINE

## 2020-06-09 RX ORDER — ESTRADIOL 1 MG/1
1 TABLET ORAL DAILY
Qty: 100 TAB | Refills: 3 | Status: SHIPPED | OUTPATIENT
Start: 2020-06-09 | End: 2021-08-16

## 2020-06-09 RX ORDER — ATORVASTATIN CALCIUM 10 MG/1
10 TABLET, FILM COATED ORAL EVERY EVENING
Qty: 100 TAB | Refills: 3 | Status: SHIPPED | OUTPATIENT
Start: 2020-06-09 | End: 2021-08-27

## 2020-06-09 RX ORDER — ZOLPIDEM TARTRATE 10 MG/1
10 TABLET ORAL
Qty: 30 EACH | Refills: 2 | Status: SHIPPED | OUTPATIENT
Start: 2020-06-09 | End: 2020-09-21

## 2020-06-09 ASSESSMENT — PATIENT HEALTH QUESTIONNAIRE - PHQ9: CLINICAL INTERPRETATION OF PHQ2 SCORE: 0

## 2020-06-09 ASSESSMENT — ACTIVITIES OF DAILY LIVING (ADL): BATHING_REQUIRES_ASSISTANCE: 0

## 2020-06-09 ASSESSMENT — FIBROSIS 4 INDEX: FIB4 SCORE: 1.4

## 2020-06-09 ASSESSMENT — ENCOUNTER SYMPTOMS: GENERAL WELL-BEING: EXCELLENT

## 2020-06-09 NOTE — PROGRESS NOTES
Chief Complaint   Patient presents with   • Annual Wellness Visit         HPI:  Vandana Beaver is a 75 y.o. here for Medicare Annual Wellness Visit     Patient Active Problem List    Diagnosis Date Noted   • CKD (chronic kidney disease) stage 3, GFR 30-59 ml/min ([facility]) 05/21/2015     Priority: Medium   • Obesity (BMI 30.0-34.9) 06/06/2017   • Increased intraocular pressure 06/06/2017   • Simple hepatic cyst 06/06/2017   • History of cataract removal with insertion of prosthetic lens 09/14/2016   • History of pneumonia 02/19/2014   • Hypothyroidism due to acquired atrophy of thyroid    • History of carpal tunnel surgery of right wrist    • MEDICAL HOME 10/23/2012   • Seasonal allergies    • Chronic insomnia    • Dyslipidemia, goal LDL below 160 05/01/2010       Current Outpatient Medications   Medication Sig Dispense Refill   • zolpidem (AMBIEN) 10 MG Tab Take 1 Tab by mouth every bedtime for 90 days. 30 Each 2   • estradiol (ESTRACE) 1 MG Tab Take 1 Tab by mouth every day. 100 Tab 3   • atorvastatin (LIPITOR) 10 MG Tab Take 1 Tab by mouth every evening. 100 Tab 3   • levothyroxine (SYNTHROID) 50 MCG Tab TAKE 1 TABLET BY MOUTH IN THE MORNING ON AN EMPTY STOMACH 90 Tab 2   • albuterol 108 (90 Base) MCG/ACT Aero Soln inhalation aerosol Inhale 2 Puffs by mouth every 6 hours as needed for Shortness of Breath. 8.5 g 0   • benzonatate (TESSALON) 100 MG Cap Take 1 Cap by mouth 3 times a day as needed for Cough. 30 Cap 0   • fluticasone (FLONASE) 50 MCG/ACT nasal spray Spray 1 Spray in nose every day. 1 Bottle 0   • timolol (TIMOPTIC) 0.5 % Solution Place 1 Drop in both eyes 2 times a day.     • Calcium Carbonate-Vitamin D (CALTRATE 600+D PO) Take  by mouth every day.     • KRILL OIL PO Take  by mouth every day.     • Multiple Vitamins-Minerals (MULTIVITAMIN PO) Take  by mouth every day.     • Misc Natural Products (OSTEO BI-FLEX JOINT SHIELD PO) Take  by mouth every day.     • cetirizine (ZYRTEC) 10 MG TABS Take 10 mg  by mouth as needed. 30 Tab 3     No current facility-administered medications for this visit.             Current supplements as per medication list.       Allergies: Codeine    Current social contact/activities:  She is limited due to the pandemic.  Hot august nights cancelled.     She  reports that she has never smoked. She has never used smokeless tobacco. She reports that she does not drink alcohol or use drugs.  Counseling given: Yes      DPA/Advanced Directive:  Patient has Advanced Directive, Living Will and Durable Power of  on file.     Annual Health Assessment Questions:    1.  Are you currently engaging in any exercise or physical activity? Yes    2.  How would you describe your mood or emotional well-being today? good    3.  Have you had any falls in the last year? No    4.  Have you noticed any problems with your balance or had difficulty walking? No    5.  In the last six months have you experienced any leakage of urine? No    6. DPA/Advanced Directive: Patient has Advanced Directive, Living Will and Durable Power of  on file.     ROS:    Gait: Uses no assistive device  Ostomy: No  Other tubes: No  Amputations: No  Chronic oxygen use: No  Last eye exam: has an upcoming appointment in July  Wears hearing aids: No   : Denies any urinary leakage during the last 6 months    Screening:  Discussed    Patient is practicing social distancing.  She is wearing a mask wherever she goes.  She has not gone to any indoor shopping or restaurant yet and does not tend to for several more weeks.    Depression Screening    Little interest or pleasure in doing things?  0 - not at all  Feeling down, depressed , or hopeless? 0 - not at all  Patient Health Questionnaire Score: 0     If depressive symptoms identified deferred to follow up visit unless specifically addressed in assessment and plan.    Interpretation of PHQ-9 Total Score   Score Severity   1-4 No Depression   5-9 Mild Depression   10-14  Moderate Depression   15-19 Moderately Severe Depression   20-27 Severe Depression    Screening for Cognitive Impairment    Three Minute Recall (village, kitchen, baby) 3/3    Arturo clock face with all 12 numbers and set the hands to show 10 past 10.  Yes 5/5    Cognitive concerns identified deferred for follow up unless specifically addressed in assessment and plan.    Fall Risk Assessment    Has the patient had two or more falls in the last year or any fall with injury in the last year?  No    Safety Assessment    Throw rugs on floor.  Yes  Handrails on all stairs.  Yes  Good lighting in all hallways.  Yes  Difficulty hearing.  Yes  Patient counseled about all safety risks that were identified.    Functional Assessment ADLs    Are there any barriers preventing you from cooking for yourself or meeting nutritional needs?  No.    Are there any barriers preventing you from driving safely or obtaining transportation?  No.    Are there any barriers preventing you from using a telephone or calling for help?  No.    Are there any barriers preventing you from shopping?  No.    Are there any barriers preventing you from taking care of your own finances?  No.    Are there any barriers preventing you from managing your medications?  No.    Are there any barriers preventing you from showering, bathing or dressing yourself?  No.    Are you currently engaging in any exercise or physical activity?  Yes.     What is your perception of your health?  Excellent.      Health Maintenance Summary                IMM ZOSTER VACCINES Postponed 11/9/2020 Originally 12/31/2013. System: vaccine not available, other system reasons     Done 11/5/2013 Imm Admin: Zoster Vaccine Live (ZVL) (Zostavax)    Annual Wellness Visit Next Due 10/8/2020      Done 10/8/2019 Visit Dx: Medicare annual wellness visit, subsequent     Patient has more history with this topic...    COLONOSCOPY Next Due 10/18/2020      Done 10/18/2010 REFERRAL TO GI FOR COLONOSCOPY      Patient has more history with this topic...    MAMMOGRAM Next Due 2/27/2021      Done 2/27/2020 MA-SCREENING MAMMO BILAT W/TOMOSYNTHESIS W/CAD     Patient has more history with this topic...    IMM DTaP/Tdap/Td Vaccine Next Due 6/6/2027      Done 6/6/2017 Imm Admin: Tdap Vaccine          Patient Care Team:  Esteban Camp M.D. as PCP - General  Neeraj Thurman O.D. as Consulting Physician (Optometry)  DEEPTI Monreal as Consulting Physician (Dermatology)  Elsa Danielle as          Social History     Tobacco Use   • Smoking status: Never Smoker   • Smokeless tobacco: Never Used   Substance Use Topics   • Alcohol use: No     Alcohol/week: 0.0 oz   • Drug use: No     Family History   Problem Relation Age of Onset   • Lung Disease Mother         66, heavy smoker, emphysema   • Osteoporosis Mother    • No Known Problems Father    • Diabetes Maternal Grandfather    • No Known Problems Daughter    • No Known Problems Daughter    • No Known Problems Daughter    • No Known Problems Son      She  has a past medical history of Anesthesia, Carpal tunnel syndrome of right wrist, Cataract, Chronic insomnia, CKD (chronic kidney disease) stage 3, GFR 30-59 ml/min ([facility]), Dyslipidemia, goal LDL below 160 (5/2010), History of pneumonia, Hypothyroidism, MEDICAL HOME (10/23/12), Obesity (BMI 30.0-34.9), Pneumonia (1/8/2016), and Seasonal allergies.   Past Surgical History:   Procedure Laterality Date   • US-NEEDLE CORE BX-BREAST PANEL Left 02/2018   • CATARACT PHACO WITH IOL Right 9/14/2016    Procedure: CATARACT PHACO WITH IOL KPE;  Surgeon: Wesley Barber M.D.;  Location: SURGERY SAME DAY [facility] ORS;  Service:    • CATARACT PHACO WITH IOL Left 9/1/2016    Procedure: CATARACT PHACO WITH IOL;  Surgeon: Wesley Barber M.D.;  Location: SURGERY SAME DAY [facility];  Service:    • CARPAL TUNNEL RELEASE Right 4/15/2016    Procedure: CARPAL TUNNEL RELEASE;  Surgeon: Chalino Urrutia M.D.;   "Location: SURGERY [facility];  Service:    • GIANFRANCO BY LAPAROSCOPY  11/11/2011    Performed by HECTOR TALAVERA at SURGERY [facility]   • COLONOSCOPY  10/18/10    martha, Dr. Leung   • TONSILLECTOMY  1960   • ABDOMINAL HYSTERECTOMY TOTAL  1980s    +ovaries + falopian, uterine fibroids       Exam:   /78   Pulse 60   Temp 36.4 °C (97.6 °F)   Resp 14   Ht 1.626 m (5' 4\")   Wt 79.4 kg (175 lb)   SpO2 94%  Body mass index is 30.04 kg/m².    Hearing good.    Dentition good  Alert, oriented in no acute distress.  Patient, physician and staff all wearing masks.  Eye contact is good, speech goal directed, affect calm  HEENT:   EOMI, PERRLA.    Neck:       Full range of motion, mild kyphosis.  No JVD or carotid bruits appreciated. No cervical adenopathy appreciated. No thyromegaly or neck masses appreciated.  No retractions appreciated.  Lungs:     Clear to auscultation A&P with good air movement.   Heart:      Regular rate and rhythm normal S1 and S2 without murmur appreciated.  Strong and symmetric radial and DP pulses.  Abd:        Soft, bowel sounds positive, nontender. No bloating noted. No hepatosplenomegaly or mass appreciated. No pulsatile mass appreciated.  Ext:         Extremities show symmetric and full range of motion with normal strength. No cyanosis or clubbing appreciated. No peripheral edema appreciated.  Neuro:    Gait is normal. Patient is lucid, fluent and appropriate. No significant tremor appreciated.  Skin:       Exhibit no rashes, pigmented lesions or ulcerations.      Assessment and Plan. The following treatment and monitoring plan is recommended:    1. Medicare annual wellness visit, subsequent   her medical problems are generally stable   2. Hypothyroidism due to acquired atrophy of thyroid  TSH   Patient reports good energy level on the medication. Patient denies insomnia, tremor or change in appetite.  Patient is taking the medication on an empty stomach in the morning and " waiting at least 30 minutes before eating.  Last TSH in September last year was at target.  Stable problem.   3. Dyslipidemia, goal LDL below 160  Comp Metabolic Panel    Lipid Profile    CBC WITHOUT DIFFERENTIAL    atorvastatin (LIPITOR) 10 MG Tab  Patient denies chest pain, chest pressure, palpitations or exertional shortness of breath. Patient denies muscle aches or muscle weakness from the atorvastatin medication. Patient is a never smoker. Patient takes no aspirin daily. Patient has no history of myocardial infarction, stroke or PVD.  The problem is clinically stable.   4. Obesity (BMI 30.0-34.9)   patient had made good progress with weight loss but unfortunately put weight back on with the pandemic.  She is working on changing her diet again.  She has gone back to the gym starting the end of last week.  She feels that will help.  Stable problem.   5. CKD (chronic kidney disease) stage 3, GFR 30-59 ml/min ([facility])  Comp Metabolic Panel   patient has had CKD for a long time.  This has been quite stable.  Follow-up lab order discussed and placed.  Denies unusual fatigue.  Clinically stable problem.    CBC WITHOUT DIFFERENTIAL   6. Chronic insomnia  zolpidem (AMBIEN) 10 MG Tab patient has struggled with insomnia for many years.  She states that even as a child she did not sleep well.  She did try the temazepam but that only gives her a couple hours of sleep and she feels a little odd the next day.  She did very well with zolpidem in the past and we will resume that.  No history of sleep driving, sleepwalking or sleep  eating.  No history of abnormal behavior associated with the medication.  Stable problem.   7. Menopausal symptoms  estradiol (ESTRACE) 1 MG Tab patient does continue the estrogen for her menopausal symptoms.  She did try to wean down in the past and had a resurgence of symptoms.  No history of blood clots or breast cancer.  Clinically stable.   8. History of cataract removal with insertion of  prosthetic lens   patient states the cataract removal went very well.  She continues to follow with ophthalmology.  Stable.   9. Raised intraocular pressure of both eyes   she follows with ophthalmology for her intraocular pressure rise.  She is on atenolol.  Denies any change in vision.  Stable problem.     Follow-up lab orders discussed and placed.    Services suggested: No services needed at this time  Health Care Screening: Age-appropriate preventive services recommended by USPTF and ACIP covered by Medicare were discussed today. Services ordered if indicated and agreed upon by the patient.  Referrals offered: Community-based lifestyle interventions to reduce health risks and promote self-management and wellness, fall prevention, nutrition, physical activity, tobacco-use cessation, weight loss, and mental health services as per orders if indicated.    Discussion today about general wellness and lifestyle habits:  discussed  · Prevent falls and reduce trip hazards; Cautioned about securing or removing rugs.  · Have a working fire alarm and carbon monoxide detector;  has  · Engage in regular physical activity and social activities     Follow-up: Return in about 6 months (around 12/9/2020), or if symptoms worsen or fail to improve.

## 2020-06-09 NOTE — NON-PROVIDER
Annual Health Assessment Questions:    1.  Are you currently engaging in any exercise or physical activity? Yes    2.  How would you describe your mood or emotional well-being today? good    3.  Have you had any falls in the last year? No    4.  Have you noticed any problems with your balance or had difficulty walking? No    5.  In the last six months have you experienced any leakage of urine? No    6. DPA/Advanced Directive: Patient has Advanced Directive on file.

## 2020-08-03 ENCOUNTER — TELEPHONE (OUTPATIENT)
Dept: MEDICAL GROUP | Facility: MEDICAL CENTER | Age: 75
End: 2020-08-03

## 2020-08-03 NOTE — TELEPHONE ENCOUNTER
She should try using a steroid nasal spray which may open the eustachian tubes in the middle ear.  What ear nose and throat specialist which she care to see?

## 2020-08-25 ENCOUNTER — OFFICE VISIT (OUTPATIENT)
Dept: MEDICAL GROUP | Facility: MEDICAL CENTER | Age: 75
End: 2020-08-25
Payer: MEDICARE

## 2020-08-25 VITALS
RESPIRATION RATE: 14 BRPM | SYSTOLIC BLOOD PRESSURE: 118 MMHG | HEART RATE: 65 BPM | DIASTOLIC BLOOD PRESSURE: 68 MMHG | TEMPERATURE: 98.4 F | BODY MASS INDEX: 29.02 KG/M2 | HEIGHT: 64 IN | WEIGHT: 170 LBS | OXYGEN SATURATION: 96 %

## 2020-08-25 DIAGNOSIS — H60.311 ACUTE DIFFUSE OTITIS EXTERNA OF RIGHT EAR: ICD-10-CM

## 2020-08-25 PROCEDURE — 99213 OFFICE O/P EST LOW 20 MIN: CPT | Performed by: FAMILY MEDICINE

## 2020-08-25 RX ORDER — NEOMYCIN SULFATE, POLYMYXIN B SULFATE AND HYDROCORTISONE 10; 3.5; 1 MG/ML; MG/ML; [USP'U]/ML
3 SUSPENSION/ DROPS AURICULAR (OTIC) 3 TIMES DAILY
Qty: 10 ML | Refills: 0 | Status: SHIPPED | OUTPATIENT
Start: 2020-08-25 | End: 2020-09-17 | Stop reason: SDUPTHER

## 2020-08-25 RX ORDER — AZITHROMYCIN 250 MG/1
TABLET, FILM COATED ORAL
Qty: 6 TAB | Refills: 0 | Status: SHIPPED | OUTPATIENT
Start: 2020-08-25 | End: 2020-09-15

## 2020-08-25 NOTE — PROGRESS NOTES
Chief Complaint   Patient presents with   • Otalgia   • Ear Fullness       Subjective:     HPI:   Vandana Beaver presents today with the followin. Acute diffuse otitis externa of right ear  She has had pain and fullness of the ear now for two weeks.  Has been using flonase and zyrtec, no problems.  Waxes and wanes.  Canal and TM some erythema.        Patient Active Problem List    Diagnosis Date Noted   • CKD (chronic kidney disease) stage 3, GFR 30-59 ml/min ([facility]) 2015     Priority: Medium   • Obesity (BMI 30.0-34.9) 2017   • Increased intraocular pressure 2017   • Simple hepatic cyst 2017   • History of cataract removal with insertion of prosthetic lens 2016   • History of pneumonia 2014   • Hypothyroidism due to acquired atrophy of thyroid    • History of carpal tunnel surgery of right wrist    • MEDICAL HOME 10/23/2012   • Seasonal allergies    • Chronic insomnia    • Dyslipidemia, goal LDL below 160 2010       Current medicines (including changes today)  Current Outpatient Medications   Medication Sig Dispense Refill   • neomycin-polymyxin-HC (PEDIOTIC HC) 3.5-12182-4 Suspension Place 3 Drops in ear 3 times a day. 10 mL 0   • azithromycin (ZITHROMAX) 250 MG Tab As directed on pack 6 Tab 0   • zolpidem (AMBIEN) 10 MG Tab Take 1 Tab by mouth every bedtime for 90 days. 30 Each 2   • estradiol (ESTRACE) 1 MG Tab Take 1 Tab by mouth every day. 100 Tab 3   • atorvastatin (LIPITOR) 10 MG Tab Take 1 Tab by mouth every evening. 100 Tab 3   • levothyroxine (SYNTHROID) 50 MCG Tab TAKE 1 TABLET BY MOUTH IN THE MORNING ON AN EMPTY STOMACH 90 Tab 2   • fluticasone (FLONASE) 50 MCG/ACT nasal spray Spray 1 Spray in nose every day. 1 Bottle 0   • timolol (TIMOPTIC) 0.5 % Solution Place 1 Drop in both eyes 2 times a day.     • Calcium Carbonate-Vitamin D (CALTRATE 600+D PO) Take  by mouth every day.     • KRILL OIL PO Take  by mouth every day.     • Multiple  "Vitamins-Minerals (MULTIVITAMIN PO) Take  by mouth every day.     • Misc Natural Products (OSTEO BI-FLEX JOINT SHIELD PO) Take  by mouth every day.     • cetirizine (ZYRTEC) 10 MG TABS Take 10 mg by mouth as needed. 30 Tab 3     No current facility-administered medications for this visit.        Allergies   Allergen Reactions   • Codeine Vomiting       ROS: As per HPI       Objective:     /68   Pulse 65   Temp 36.9 °C (98.4 °F)   Resp 14   Ht 1.626 m (5' 4\")   Wt 77.1 kg (170 lb)   SpO2 96%  Body mass index is 29.18 kg/m².    Physical Exam:  Constitutional: Well-developed and well-nourished. Not diaphoretic. No distress. Lucid and fluent.  Patient, physician and staff all wearing masks.  Skin: Skin is warm and dry. No rash noted.  Head: Atraumatic without lesions.  Right ear canal moderate erythema without drainage.  Right TM mild erythema.   Eyes: Conjunctivae and extraocular motions are normal. Pupils are equal, round, and reactive to light. No scleral icterus.   Neck: Supple, trachea midline. No thyromegaly present. No cervical or supraclavicular lymphadenopathy. No JVD appreciated  Extremities: No cyanosis, clubbing, erythema, nor edema.   Neurological: Alert and oriented x 3. No tremor.  Psychiatric:  Behavior, mood, and affect are appropriate.       Assessment and Plan:     75 y.o. female with the following issues:    1. Acute diffuse otitis externa of right ear  neomycin-polymyxin-HC (PEDIOTIC HC) 3.5-58868-2 Suspension    azithromycin (ZITHROMAX) 250 MG Tab         Followup: Return in about 6 months (around 2/25/2021), or if symptoms worsen or fail to improve.  "

## 2020-09-14 ENCOUNTER — HOSPITAL ENCOUNTER (OUTPATIENT)
Dept: LAB | Facility: MEDICAL CENTER | Age: 75
End: 2020-09-14
Attending: FAMILY MEDICINE
Payer: MEDICARE

## 2020-09-14 DIAGNOSIS — N18.30 CKD (CHRONIC KIDNEY DISEASE) STAGE 3, GFR 30-59 ML/MIN: ICD-10-CM

## 2020-09-14 DIAGNOSIS — E03.4 HYPOTHYROIDISM DUE TO ACQUIRED ATROPHY OF THYROID: ICD-10-CM

## 2020-09-14 DIAGNOSIS — E78.5 DYSLIPIDEMIA, GOAL LDL BELOW 160: ICD-10-CM

## 2020-09-14 LAB
ALBUMIN SERPL BCP-MCNC: 3.9 G/DL (ref 3.2–4.9)
ALBUMIN/GLOB SERPL: 1.6 G/DL
ALP SERPL-CCNC: 55 U/L (ref 30–99)
ALT SERPL-CCNC: 11 U/L (ref 2–50)
ANION GAP SERPL CALC-SCNC: 10 MMOL/L (ref 7–16)
AST SERPL-CCNC: 12 U/L (ref 12–45)
BILIRUB SERPL-MCNC: 0.3 MG/DL (ref 0.1–1.5)
BUN SERPL-MCNC: 19 MG/DL (ref 8–22)
CALCIUM SERPL-MCNC: 9.3 MG/DL (ref 8.5–10.5)
CHLORIDE SERPL-SCNC: 106 MMOL/L (ref 96–112)
CHOLEST SERPL-MCNC: 184 MG/DL (ref 100–199)
CO2 SERPL-SCNC: 24 MMOL/L (ref 20–33)
CREAT SERPL-MCNC: 1.11 MG/DL (ref 0.5–1.4)
ERYTHROCYTE [DISTWIDTH] IN BLOOD BY AUTOMATED COUNT: 46.7 FL (ref 35.9–50)
FASTING STATUS PATIENT QL REPORTED: NORMAL
GLOBULIN SER CALC-MCNC: 2.4 G/DL (ref 1.9–3.5)
GLUCOSE SERPL-MCNC: 97 MG/DL (ref 65–99)
HCT VFR BLD AUTO: 41.7 % (ref 37–47)
HDLC SERPL-MCNC: 89 MG/DL
HGB BLD-MCNC: 13.8 G/DL (ref 12–16)
LDLC SERPL CALC-MCNC: 68 MG/DL
MCH RBC QN AUTO: 33.4 PG (ref 27–33)
MCHC RBC AUTO-ENTMCNC: 33.1 G/DL (ref 33.6–35)
MCV RBC AUTO: 101 FL (ref 81.4–97.8)
PLATELET # BLD AUTO: 220 K/UL (ref 164–446)
PMV BLD AUTO: 11.6 FL (ref 9–12.9)
POTASSIUM SERPL-SCNC: 4.3 MMOL/L (ref 3.6–5.5)
PROT SERPL-MCNC: 6.3 G/DL (ref 6–8.2)
RBC # BLD AUTO: 4.13 M/UL (ref 4.2–5.4)
SODIUM SERPL-SCNC: 140 MMOL/L (ref 135–145)
TRIGL SERPL-MCNC: 135 MG/DL (ref 0–149)
TSH SERPL DL<=0.005 MIU/L-ACNC: 4.42 UIU/ML (ref 0.38–5.33)
WBC # BLD AUTO: 6.1 K/UL (ref 4.8–10.8)

## 2020-09-14 PROCEDURE — 80061 LIPID PANEL: CPT

## 2020-09-14 PROCEDURE — 36415 COLL VENOUS BLD VENIPUNCTURE: CPT

## 2020-09-14 PROCEDURE — 80053 COMPREHEN METABOLIC PANEL: CPT

## 2020-09-14 PROCEDURE — 85027 COMPLETE CBC AUTOMATED: CPT

## 2020-09-14 PROCEDURE — 84443 ASSAY THYROID STIM HORMONE: CPT

## 2020-09-17 DIAGNOSIS — H60.311 ACUTE DIFFUSE OTITIS EXTERNA OF RIGHT EAR: ICD-10-CM

## 2020-09-17 DIAGNOSIS — H60.311 CHRONIC DIFFUSE OTITIS EXTERNA OF RIGHT EAR: ICD-10-CM

## 2020-09-17 RX ORDER — NEOMYCIN SULFATE, POLYMYXIN B SULFATE AND HYDROCORTISONE 10; 3.5; 1 MG/ML; MG/ML; [USP'U]/ML
3 SUSPENSION/ DROPS AURICULAR (OTIC) 3 TIMES DAILY
Qty: 10 ML | Refills: 0 | Status: SHIPPED | OUTPATIENT
Start: 2020-09-17 | End: 2020-12-09

## 2020-09-18 NOTE — PROGRESS NOTES
Patient is having recurring and persistent otitis externa.  Referral to ear nose and throat specialist discussed and placed.

## 2020-09-21 DIAGNOSIS — F51.04 CHRONIC INSOMNIA: ICD-10-CM

## 2020-09-21 RX ORDER — ZOLPIDEM TARTRATE 10 MG/1
TABLET ORAL
Qty: 30 TAB | Refills: 0 | Status: SHIPPED | OUTPATIENT
Start: 2020-09-21 | End: 2020-10-26 | Stop reason: SDUPTHER

## 2020-09-23 ENCOUNTER — TELEPHONE (OUTPATIENT)
Dept: MEDICAL GROUP | Facility: MEDICAL CENTER | Age: 75
End: 2020-09-23

## 2020-09-23 DIAGNOSIS — H60.311 CHRONIC DIFFUSE OTITIS EXTERNA OF RIGHT EAR: ICD-10-CM

## 2020-09-23 RX ORDER — AZITHROMYCIN 250 MG/1
TABLET, FILM COATED ORAL
Qty: 6 TAB | Refills: 0 | Status: SHIPPED | OUTPATIENT
Start: 2020-09-23 | End: 2020-12-09

## 2020-09-23 NOTE — PROGRESS NOTES
zpak sent to pharmacy.  Patient has ENT referral on the 29th.  That is different than the ENT that the initial referral went to so I have changed and updated that referral.  She now has an appointment with Dr. Melendez on the 29th.

## 2020-10-08 ENCOUNTER — TELEPHONE (OUTPATIENT)
Dept: MEDICAL GROUP | Facility: MEDICAL CENTER | Age: 75
End: 2020-10-08

## 2020-10-08 DIAGNOSIS — F41.8 SITUATIONAL ANXIETY: ICD-10-CM

## 2020-10-08 RX ORDER — DIAZEPAM 5 MG/1
5 TABLET ORAL EVERY 12 HOURS PRN
Qty: 28 TAB | Refills: 0 | Status: SHIPPED | OUTPATIENT
Start: 2020-10-08 | End: 2020-11-04 | Stop reason: SDUPTHER

## 2020-10-09 NOTE — TELEPHONE ENCOUNTER
I have sent a renewal on the diazepam to her [facility] pharmacy.  I have increased the dose a little bit.

## 2020-10-14 ENCOUNTER — NON-PROVIDER VISIT (OUTPATIENT)
Dept: MEDICAL GROUP | Facility: LAB | Age: 75
End: 2020-10-14
Payer: MEDICARE

## 2020-10-14 DIAGNOSIS — Z23 NEED FOR VACCINATION: ICD-10-CM

## 2020-10-14 PROCEDURE — G0008 ADMIN INFLUENZA VIRUS VAC: HCPCS | Performed by: NURSE PRACTITIONER

## 2020-10-14 PROCEDURE — 90662 IIV NO PRSV INCREASED AG IM: CPT | Performed by: NURSE PRACTITIONER

## 2020-10-14 NOTE — PROGRESS NOTES
"Vandana Beaver is a 75 y.o. female here for a non-provider visit for:   FLU    Reason for immunization: Annual Flu Vaccine  Immunization records indicate need for vaccine: Yes, confirmed with Epic  Minimum interval has been met for this vaccine: Yes  ABN completed: Yes    Order and dose verified by: ar  VIS Dated   was given to patient: Yes  All IAC Questionnaire questions were answered \"No.\"    Patient tolerated injection and no adverse effects were observed or reported: Yes    Pt scheduled for next dose in series: Not Indicated   "

## 2020-10-26 DIAGNOSIS — F51.04 CHRONIC INSOMNIA: ICD-10-CM

## 2020-10-26 RX ORDER — ZOLPIDEM TARTRATE 10 MG/1
10 TABLET ORAL
Qty: 30 TAB | Refills: 0 | Status: SHIPPED | OUTPATIENT
Start: 2020-10-26 | End: 2020-11-25

## 2020-10-27 RX ORDER — ZOLPIDEM TARTRATE 10 MG/1
TABLET ORAL
Qty: 30 TAB | Refills: 2 | Status: SHIPPED | OUTPATIENT
Start: 2020-10-27 | End: 2020-12-09 | Stop reason: SDUPTHER

## 2020-11-04 DIAGNOSIS — F41.8 SITUATIONAL ANXIETY: ICD-10-CM

## 2020-11-04 RX ORDER — DIAZEPAM 5 MG/1
5 TABLET ORAL EVERY 12 HOURS PRN
Qty: 45 TAB | Refills: 2 | Status: SHIPPED | OUTPATIENT
Start: 2020-11-04 | End: 2021-04-08

## 2020-11-04 NOTE — PROGRESS NOTES
Patient has weaned down to 1/2 tablet of diazepam twice daily.  Occasionally she does need a full tab for sleep at night.  She asks if I will renew.  I have sent this renewal to her local [facility] pharmacy.

## 2020-12-09 ENCOUNTER — OFFICE VISIT (OUTPATIENT)
Dept: MEDICAL GROUP | Facility: MEDICAL CENTER | Age: 75
End: 2020-12-09
Payer: MEDICARE

## 2020-12-09 VITALS — BODY MASS INDEX: 29.02 KG/M2 | HEIGHT: 64 IN | WEIGHT: 170 LBS

## 2020-12-09 DIAGNOSIS — E03.4 HYPOTHYROIDISM DUE TO ACQUIRED ATROPHY OF THYROID: ICD-10-CM

## 2020-12-09 DIAGNOSIS — F51.04 CHRONIC INSOMNIA: ICD-10-CM

## 2020-12-09 PROCEDURE — 99441 PR PHYSICIAN TELEPHONE EVALUATION 5-10 MIN: CPT | Mod: CR | Performed by: FAMILY MEDICINE

## 2020-12-09 RX ORDER — LEVOTHYROXINE SODIUM 0.05 MG/1
50 TABLET ORAL
Qty: 90 TAB | Refills: 3 | Status: SHIPPED | OUTPATIENT
Start: 2020-12-09 | End: 2021-11-01 | Stop reason: SDUPTHER

## 2020-12-09 RX ORDER — ZOLPIDEM TARTRATE 10 MG/1
10 TABLET ORAL
Qty: 30 TAB | Refills: 5 | Status: SHIPPED | OUTPATIENT
Start: 2020-12-09 | End: 2021-05-19

## 2020-12-09 ASSESSMENT — FIBROSIS 4 INDEX: FIB4 SCORE: 1.23

## 2020-12-09 NOTE — PROGRESS NOTES
"  Telephone Appointment Visit   As a means of avoiding spread of COVID-19, this visit is being conducted by telephone. This telephone visit was initiated by the patient and they verbally consented.    Time at start of call: 335    Reason for Call:  Medication Follow-up and Symptom Follow-up  Lab follow up    HPI:      1. Hypothyroidism due to acquired atrophy of thyroid  Patient needs renewal of her thyroid medication.  She completed a TSH in September that was in the normal but not ideal range.  Discussed with patient, she feels fine on this current dosing.  She already has chronic insomnia and I have decided not to increase her dose.  - levothyroxine (SYNTHROID) 50 MCG Tab; Take 1 Tab by mouth Every morning on an empty stomach.  Dispense: 90 Tab; Refill: 3    2. Chronic insomnia  Patient needs a renewal on her zolpidem for her chronic insomnia.  Her PDMP review shows no inconsistencies.  Patient states the medication continues to be very helpful giving her between 6 and 7 hours of restful sleep.  Denies any sleepwalking, sleep eating or unusual behavior.  Patient states without the zolpidem even taking a half she gets 4 hours of sleep or less and is not rested.  This is renewed.  - zolpidem (AMBIEN) 10 MG Tab; Take 1 Tab by mouth every bedtime for 180 days.  Dispense: 30 Tab; Refill: 5    She now is able to use less diazepam.  She is down to around 1/2 per day, sometimes a full one, trying to use as little as possible.  States she has plenty currently and does not need a renewal.    Labs / Images Reviewed:     Patient is staying in isolation, wearing a mask when she goes out.  She states she still having some difficulty believing in this as no one she knows has gotten sick except one person and it was mild.  Told her she is fortunate in having so many cautious and intelligent friends.  She was looking forward to having some normalcy by this time next year.    Ht 1.626 m (5' 4\")   Wt 77.1 kg (170 lb) "     Assessment and Plan:     1. Hypothyroidism due to acquired atrophy of thyroid  - levothyroxine (SYNTHROID) 50 MCG Tab; Take 1 Tab by mouth Every morning on an empty stomach.  Dispense: 90 Tab; Refill: 3    2. Chronic insomnia  - zolpidem (AMBIEN) 10 MG Tab; Take 1 Tab by mouth every bedtime for 180 days.  Dispense: 30 Tab; Refill: 5      Follow-up: 4 months, sooner as needed    Time at end of call: 343  Total Time Spent: 5-10 minutes    Esteban Camp M.D.

## 2021-01-11 DIAGNOSIS — Z23 NEED FOR VACCINATION: ICD-10-CM

## 2021-02-10 ENCOUNTER — APPOINTMENT (OUTPATIENT)
Dept: FAMILY PLANNING/WOMEN'S HEALTH CLINIC | Facility: IMMUNIZATION CENTER | Age: 76
End: 2021-02-10
Attending: INTERNAL MEDICINE
Payer: MEDICARE

## 2021-02-10 ENCOUNTER — IMMUNIZATION (OUTPATIENT)
Dept: FAMILY PLANNING/WOMEN'S HEALTH CLINIC | Facility: IMMUNIZATION CENTER | Age: 76
End: 2021-02-10
Payer: MEDICARE

## 2021-02-10 DIAGNOSIS — Z23 ENCOUNTER FOR VACCINATION: Primary | ICD-10-CM

## 2021-02-10 DIAGNOSIS — Z23 NEED FOR VACCINATION: ICD-10-CM

## 2021-02-10 PROCEDURE — 0001A PFIZER SARS-COV-2 VACCINE: CPT

## 2021-02-10 PROCEDURE — 91300 PFIZER SARS-COV-2 VACCINE: CPT

## 2021-03-03 ENCOUNTER — IMMUNIZATION (OUTPATIENT)
Dept: FAMILY PLANNING/WOMEN'S HEALTH CLINIC | Facility: IMMUNIZATION CENTER | Age: 76
End: 2021-03-03
Attending: INTERNAL MEDICINE
Payer: MEDICARE

## 2021-03-03 DIAGNOSIS — Z23 ENCOUNTER FOR VACCINATION: Primary | ICD-10-CM

## 2021-03-03 PROCEDURE — 0002A PFIZER SARS-COV-2 VACCINE: CPT

## 2021-03-03 PROCEDURE — 91300 PFIZER SARS-COV-2 VACCINE: CPT

## 2021-03-21 DIAGNOSIS — Z11.52 ENCOUNTER FOR SCREENING FOR COVID-19: ICD-10-CM

## 2021-03-25 ENCOUNTER — HOSPITAL ENCOUNTER (OUTPATIENT)
Dept: RADIOLOGY | Facility: MEDICAL CENTER | Age: 76
End: 2021-03-25
Attending: FAMILY MEDICINE
Payer: MEDICARE

## 2021-03-25 DIAGNOSIS — Z12.31 ENCOUNTER FOR MAMMOGRAM TO ESTABLISH BASELINE MAMMOGRAM: ICD-10-CM

## 2021-03-25 PROCEDURE — 77063 BREAST TOMOSYNTHESIS BI: CPT

## 2021-04-01 NOTE — PROGRESS NOTES
Assisted member with her concerns regarding covid testing prior to leaving on a trip. Member received a letter from [facility] of locations for testing outside of [facility]. Member currently has appointment schedule. Informed member to keep upcoming appointments. She will be able to print out results and take to airlines for proof of testing and results. No other concerns.

## 2021-04-23 DIAGNOSIS — N34.3 DYSURIA-FREQUENCY SYNDROME: ICD-10-CM

## 2021-04-23 RX ORDER — CEPHALEXIN 500 MG/1
500 CAPSULE ORAL 3 TIMES DAILY
Qty: 15 CAPSULE | Refills: 0 | Status: SHIPPED | OUTPATIENT
Start: 2021-04-23 | End: 2021-05-03 | Stop reason: SDUPTHER

## 2021-05-03 DIAGNOSIS — N34.3 DYSURIA-FREQUENCY SYNDROME: ICD-10-CM

## 2021-05-03 RX ORDER — CEPHALEXIN 500 MG/1
500 CAPSULE ORAL 3 TIMES DAILY
Qty: 15 CAPSULE | Refills: 0 | Status: SHIPPED | OUTPATIENT
Start: 2021-05-03 | End: 2021-05-08

## 2021-06-21 DIAGNOSIS — F51.04 CHRONIC INSOMNIA: ICD-10-CM

## 2021-06-22 RX ORDER — ZOLPIDEM TARTRATE 10 MG/1
10 TABLET ORAL
Qty: 30 TABLET | Refills: 0 | Status: SHIPPED | OUTPATIENT
Start: 2021-06-22 | End: 2021-07-14 | Stop reason: SDUPTHER

## 2021-07-07 ENCOUNTER — TELEPHONE (OUTPATIENT)
Dept: MEDICAL GROUP | Facility: MEDICAL CENTER | Age: 76
End: 2021-07-07

## 2021-07-07 NOTE — TELEPHONE ENCOUNTER
ESTABLISHED PATIENT PRE-VISIT PLANNING     Patient was NOT contacted to complete PVP.     Note: Patient will not be contacted if there is no indication to call.     1.  Reviewed notes from the last few office visits within the medical group: Yes    2.  If any orders were placed at last visit or intended to be done for this visit (i.e. 6 mos follow-up), do we have Results/Consult Notes?         •  Labs - Labs were not ordered at last office visit.  Note: If patient appointment is for lab review and patient did not complete labs, check with provider if OK to reschedule patient until labs completed.       •  Imaging - Imaging was not ordered at last office visit.       •  Referrals - No referrals were ordered at last office visit.    3. Is this appointment scheduled as a Hospital Follow-Up? No    4.  Immunizations were updated in Epic using Reconcile Outside Information activity? Yes    5.  Patient is due for the following Health Maintenance Topics:   Health Maintenance Due   Topic Date Due   • IMM ZOSTER VACCINES (2 of 3) 12/31/2013   • COLONOSCOPY  10/18/2020   • Annual Wellness Visit  06/10/2021         6.  AHA (Pulse[employer]) form printed for Provider? Yes

## 2021-07-14 ENCOUNTER — OFFICE VISIT (OUTPATIENT)
Dept: MEDICAL GROUP | Facility: MEDICAL CENTER | Age: 76
End: 2021-07-14
Payer: MEDICARE

## 2021-07-14 VITALS
OXYGEN SATURATION: 94 % | WEIGHT: 171 LBS | BODY MASS INDEX: 29.19 KG/M2 | HEIGHT: 64 IN | SYSTOLIC BLOOD PRESSURE: 116 MMHG | HEART RATE: 84 BPM | RESPIRATION RATE: 16 BRPM | DIASTOLIC BLOOD PRESSURE: 74 MMHG | TEMPERATURE: 98.5 F

## 2021-07-14 DIAGNOSIS — N18.31 STAGE 3A CHRONIC KIDNEY DISEASE: ICD-10-CM

## 2021-07-14 DIAGNOSIS — E78.5 DYSLIPIDEMIA, GOAL LDL BELOW 160: ICD-10-CM

## 2021-07-14 DIAGNOSIS — F41.8 SITUATIONAL ANXIETY: ICD-10-CM

## 2021-07-14 DIAGNOSIS — E03.4 HYPOTHYROIDISM DUE TO ACQUIRED ATROPHY OF THYROID: ICD-10-CM

## 2021-07-14 DIAGNOSIS — F51.04 CHRONIC INSOMNIA: ICD-10-CM

## 2021-07-14 DIAGNOSIS — I72.8 SPLENIC ARTERY ANEURYSM (HCC): ICD-10-CM

## 2021-07-14 PROBLEM — E66.811 OBESITY (BMI 30.0-34.9): Status: RESOLVED | Noted: 2017-06-06 | Resolved: 2021-07-14

## 2021-07-14 PROBLEM — E66.9 OBESITY (BMI 30.0-34.9): Status: RESOLVED | Noted: 2017-06-06 | Resolved: 2021-07-14

## 2021-07-14 PROCEDURE — 99214 OFFICE O/P EST MOD 30 MIN: CPT | Performed by: FAMILY MEDICINE

## 2021-07-14 RX ORDER — DIAZEPAM 5 MG/1
5 TABLET ORAL EVERY 12 HOURS PRN
Qty: 45 TABLET | Refills: 2 | Status: SHIPPED | OUTPATIENT
Start: 2021-07-14 | End: 2021-11-01 | Stop reason: SDUPTHER

## 2021-07-14 RX ORDER — ZOLPIDEM TARTRATE 10 MG/1
10 TABLET ORAL
Qty: 30 TABLET | Refills: 2 | Status: SHIPPED | OUTPATIENT
Start: 2021-07-14 | End: 2021-07-20

## 2021-07-14 ASSESSMENT — FIBROSIS 4 INDEX: FIB4 SCORE: 1.25

## 2021-07-14 ASSESSMENT — PATIENT HEALTH QUESTIONNAIRE - PHQ9: CLINICAL INTERPRETATION OF PHQ2 SCORE: 0

## 2021-07-14 NOTE — NON-PROVIDER
1.  Are you currently engaging in any exercise or physical activity? No    2.  How would you describe your mood or emotional well-being today? anxious    3.  Have you had any falls in the last year? No    4.  Have you noticed any problems with your balance or had difficulty walking? No    5.  In the last six months have you experienced any leakage of urine? No    6. DPA/Advanced Directive: Patient has Advanced Directive on file.

## 2021-07-14 NOTE — PROGRESS NOTES
Chief Complaint   Patient presents with   • Chronic Kidney Disease   • Hypothyroidism   • Hyperlipidemia   • Insomnia   • Anxiety       Subjective:     HPI:   Vandana Beaver presents today with the followin. Stage 3a chronic kidney disease (HCC)  Her kidney disease continues to be quite stable.  Follow-up lab order discussed and placed.  Patient stays well-hydrated.    2. Hypothyroidism due to acquired atrophy of thyroid  Patient reports good energy level on the medication. Patient denies insomnia, tremor or change in appetite.  Patient is taking the medication on an empty stomach in the morning and waiting at least 30 minutes before eating.  Last TSH in 2020 was a little above target.  It was still in the normal range.  Follow-up lab orders discussed and placed    3. Dyslipidemia, goal LDL below 160  Patient denies chest pain, chest pressure, palpitations or exertional shortness of breath. Patient denies muscle aches or muscle weakness from the atorvastatin medication. Patient is a never smoker. Patient takes no aspirin daily. Patient has no history of myocardial infarction, stroke or PVD.  Follow-up lab order discussed and placed.    4. Chronic insomnia  Patient does have chronic insomnia.  She has been using zolpidem for this for many years.  Using the zolpidem she still gets about 4 to 6 hours of sleep.  Without the zolpidem she does not sleep at all.  However, she states she does not want to change the medication or increase the medication.  PDMP review shows no inconsistencies.  This is renewed.    5. Situational anxiety  Patient does use occasional diazepam for situational anxiety.  She is very worried about her 's health right now and is using typically 1/2 tablet twice daily.  She is occasionally having to use another half.  Her son recently had an accident.  PDMP review shows no inconsistencies.    6. Splenic artery aneurysm (HCC)  Discussed this finding from past CTs.   Patient has very stable small splenic artery aneurysm.  There is no pain or discomfort in that region.  We discussed pros and cons of further imaging.  We decided not to image at this time.      Patient Active Problem List    Diagnosis Date Noted   • Splenic artery aneurysm (HCC) 07/14/2021   • Situational anxiety 07/14/2021   • Increased intraocular pressure 06/06/2017   • Simple hepatic cyst 06/06/2017   • History of cataract removal with insertion of prosthetic lens 09/14/2016   • CKD (chronic kidney disease) stage 3, GFR 30-59 ml/min (HCC) 05/21/2015   • History of pneumonia 02/19/2014   • Hypothyroidism due to acquired atrophy of thyroid    • History of carpal tunnel surgery of right wrist    • [facility] 10/23/2012   • Seasonal allergies    • Chronic insomnia    • Dyslipidemia, goal LDL below 160 05/01/2010       Current medicines (including changes today)  Current Outpatient Medications   Medication Sig Dispense Refill   • zolpidem (AMBIEN) 10 MG Tab Take 1 tablet by mouth at bedtime for 90 days. 30 tablet 2   • diazePAM (VALIUM) 5 MG Tab Take 1 tablet by mouth every 12 hours as needed for Anxiety for up to 90 days. 45 tablet 2   • levothyroxine (SYNTHROID) 50 MCG Tab Take 1 Tab by mouth Every morning on an empty stomach. 90 Tab 3   • estradiol (ESTRACE) 1 MG Tab Take 1 Tab by mouth every day. 100 Tab 3   • atorvastatin (LIPITOR) 10 MG Tab Take 1 Tab by mouth every evening. 100 Tab 3   • fluticasone (FLONASE) 50 MCG/ACT nasal spray Spray 1 Spray in nose every day. 1 Bottle 0   • timolol (TIMOPTIC) 0.5 % Solution Place 1 Drop in both eyes 2 times a day.     • Calcium Carbonate-Vitamin D (CALTRATE 600+D PO) Take  by mouth every day.     • KRILL OIL PO Take  by mouth every day.     • Multiple Vitamins-Minerals (MULTIVITAMIN PO) Take  by mouth every day.     • Misc Natural Products (OSTEO BI-FLEX JOINT SHIELD PO) Take  by mouth every day.     • cetirizine (ZYRTEC) 10 MG TABS Take 10 mg by mouth as needed. 30  "Tab 3     No current facility-administered medications for this visit.       Allergies   Allergen Reactions   • Codeine Vomiting       ROS: As per HPI       Objective:     /74   Pulse 84   Temp 36.9 °C (98.5 °F)   Resp 16   Ht 1.626 m (5' 4\")   Wt 77.6 kg (171 lb)   SpO2 94%  Body mass index is 29.35 kg/m².    Physical Exam:  Constitutional: Well-developed and well-nourished. Not diaphoretic. No distress. Lucid and fluent.  Skin: Skin is warm and dry. No rash noted.  Head: Atraumatic without lesions.  Eyes: Conjunctivae and extraocular motions are normal. Pupils are equal, round, and reactive to light. No scleral icterus.   Ears:  External ears unremarkable.   Mouth/Throat: brief examination. Oropharynx is clear and moist. Posterior pharynx without erythema or exudates.  Neck: Supple, trachea midline. No thyromegaly present. No cervical or supraclavicular lymphadenopathy. No JVD or carotid bruits appreciated  Cardiovascular: Regular rate and rhythm.  Normal S1, S2 without murmur appreciated.  Chest: Effort normal. Clear to auscultation throughout. No adventitious sounds.   Abdomen: Soft, non tender, and without distention. Active bowel sounds in all four quadrants. No rebound, guarding, masses or hepatosplenomegaly.  Extremities: No cyanosis, clubbing, erythema, nor edema.   Neurological: Alert and oriented x 3. No tremor appreciated.  Psychiatric:  Behavior, mood, and affect are appropriate.       Assessment and Plan:     76 y.o. female with the following issues:    1. Stage 3a chronic kidney disease (HCC)  Comp Metabolic Panel    CBC WITHOUT DIFFERENTIAL   2. Hypothyroidism due to acquired atrophy of thyroid  TSH   3. Dyslipidemia, goal LDL below 160  Comp Metabolic Panel    Lipid Profile    CBC WITHOUT DIFFERENTIAL   4. Chronic insomnia  zolpidem (AMBIEN) 10 MG Tab   5. Situational anxiety  diazePAM (VALIUM) 5 MG Tab   6. Splenic artery aneurysm (HCC)           Followup: Return in about 6 months " (around 1/14/2022), or if symptoms worsen or fail to improve.

## 2021-07-15 ENCOUNTER — TELEPHONE (OUTPATIENT)
Dept: MEDICAL GROUP | Facility: MEDICAL CENTER | Age: 76
End: 2021-07-15

## 2021-07-16 NOTE — TELEPHONE ENCOUNTER
I actually have no record of the call.  I am not seeing anything concerning that I would have called her about.  Please let her know that the call may have been from [facility] concerning scheduling an annual wellness visit.  If she wants to do that in a few months I think that is fine.

## 2021-08-16 DIAGNOSIS — N95.1 MENOPAUSAL SYMPTOMS: ICD-10-CM

## 2021-08-16 RX ORDER — ESTRADIOL 1 MG/1
TABLET ORAL
Qty: 90 TABLET | Refills: 0 | Status: SHIPPED | OUTPATIENT
Start: 2021-08-16 | End: 2021-11-01 | Stop reason: SDUPTHER

## 2021-08-18 ENCOUNTER — HOSPITAL ENCOUNTER (OUTPATIENT)
Dept: LAB | Facility: MEDICAL CENTER | Age: 76
End: 2021-08-18
Attending: FAMILY MEDICINE
Payer: MEDICARE

## 2021-08-18 DIAGNOSIS — E03.4 HYPOTHYROIDISM DUE TO ACQUIRED ATROPHY OF THYROID: ICD-10-CM

## 2021-08-18 DIAGNOSIS — N18.31 STAGE 3A CHRONIC KIDNEY DISEASE: ICD-10-CM

## 2021-08-18 DIAGNOSIS — E78.5 DYSLIPIDEMIA, GOAL LDL BELOW 160: ICD-10-CM

## 2021-08-18 LAB
ALBUMIN SERPL BCP-MCNC: 4.1 G/DL (ref 3.2–4.9)
ALBUMIN/GLOB SERPL: 1.6 G/DL
ALP SERPL-CCNC: 63 U/L (ref 30–99)
ALT SERPL-CCNC: 13 U/L (ref 2–50)
ANION GAP SERPL CALC-SCNC: 12 MMOL/L (ref 7–16)
AST SERPL-CCNC: 14 U/L (ref 12–45)
BILIRUB SERPL-MCNC: 0.5 MG/DL (ref 0.1–1.5)
BUN SERPL-MCNC: 21 MG/DL (ref 8–22)
CALCIUM SERPL-MCNC: 9.4 MG/DL (ref 8.5–10.5)
CHLORIDE SERPL-SCNC: 106 MMOL/L (ref 96–112)
CHOLEST SERPL-MCNC: 206 MG/DL (ref 100–199)
CO2 SERPL-SCNC: 23 MMOL/L (ref 20–33)
CREAT SERPL-MCNC: 1.04 MG/DL (ref 0.5–1.4)
ERYTHROCYTE [DISTWIDTH] IN BLOOD BY AUTOMATED COUNT: 46 FL (ref 35.9–50)
FASTING STATUS PATIENT QL REPORTED: NORMAL
GLOBULIN SER CALC-MCNC: 2.6 G/DL (ref 1.9–3.5)
GLUCOSE SERPL-MCNC: 102 MG/DL (ref 65–99)
HCT VFR BLD AUTO: 43.6 % (ref 37–47)
HDLC SERPL-MCNC: 81 MG/DL
HGB BLD-MCNC: 14.4 G/DL (ref 12–16)
LDLC SERPL CALC-MCNC: 102 MG/DL
MCH RBC QN AUTO: 32.9 PG (ref 27–33)
MCHC RBC AUTO-ENTMCNC: 33 G/DL (ref 33.6–35)
MCV RBC AUTO: 99.5 FL (ref 81.4–97.8)
PLATELET # BLD AUTO: 242 K/UL (ref 164–446)
PMV BLD AUTO: 11.7 FL (ref 9–12.9)
POTASSIUM SERPL-SCNC: 4.5 MMOL/L (ref 3.6–5.5)
PROT SERPL-MCNC: 6.7 G/DL (ref 6–8.2)
RBC # BLD AUTO: 4.38 M/UL (ref 4.2–5.4)
SODIUM SERPL-SCNC: 141 MMOL/L (ref 135–145)
TRIGL SERPL-MCNC: 116 MG/DL (ref 0–149)
TSH SERPL DL<=0.005 MIU/L-ACNC: 3.02 UIU/ML (ref 0.38–5.33)
WBC # BLD AUTO: 6.3 K/UL (ref 4.8–10.8)

## 2021-08-18 PROCEDURE — 84443 ASSAY THYROID STIM HORMONE: CPT

## 2021-08-18 PROCEDURE — 85027 COMPLETE CBC AUTOMATED: CPT

## 2021-08-18 PROCEDURE — 36415 COLL VENOUS BLD VENIPUNCTURE: CPT

## 2021-08-18 PROCEDURE — 80061 LIPID PANEL: CPT

## 2021-08-18 PROCEDURE — 80053 COMPREHEN METABOLIC PANEL: CPT

## 2021-08-27 DIAGNOSIS — E78.5 DYSLIPIDEMIA, GOAL LDL BELOW 160: ICD-10-CM

## 2021-08-27 RX ORDER — ATORVASTATIN CALCIUM 10 MG/1
TABLET, FILM COATED ORAL
Qty: 100 TABLET | Refills: 0 | Status: SHIPPED | OUTPATIENT
Start: 2021-08-27 | End: 2021-11-01 | Stop reason: SDUPTHER

## 2021-08-28 ENCOUNTER — PATIENT MESSAGE (OUTPATIENT)
Dept: HEALTH INFORMATION MANAGEMENT | Facility: OTHER | Age: 76
End: 2021-08-28

## 2021-10-15 DIAGNOSIS — F51.04 CHRONIC INSOMNIA: ICD-10-CM

## 2021-10-15 RX ORDER — ZOLPIDEM TARTRATE 10 MG/1
10 TABLET ORAL
Qty: 18 EACH | Refills: 0 | Status: SHIPPED | OUTPATIENT
Start: 2021-10-15 | End: 2021-11-01

## 2021-10-28 ENCOUNTER — TELEPHONE (OUTPATIENT)
Dept: MEDICAL GROUP | Facility: MEDICAL CENTER | Age: 76
End: 2021-10-28

## 2021-10-28 NOTE — TELEPHONE ENCOUNTER
ESTABLISHED PATIENT PRE-VISIT PLANNING     Annual Wellness Visit Over Due    Patient was NOT contacted to complete PVP.     Note: Patient will not be contacted if there is no indication to call.     1.  Reviewed notes from the last few office visits within the medical group: Yes    2.  If any orders were placed at last visit or intended to be done for this visit (i.e. 6 mos follow-up), do we have Results/Consult Notes?         •  Labs - Labs ordered, completed on 08/18/2021 and results are in chart.    -Future lab: COVID/SARS CoV-2 PCR: Not Done  Note: If patient appointment is for lab review and patient did not complete labs, check with provider if OK to reschedule patient until labs completed.       •  Imaging - Imaging was not ordered at last office visit.       •  Referrals - No referrals were ordered at last office visit.    3. Is this appointment scheduled as a Hospital Follow-Up? No    4.  Immunizations were updated in Epic using Reconcile Outside Information activity? Yes    5.  Patient is due for the following Health Maintenance Topics:   Health Maintenance Due   Topic Date Due   • Annual Wellness Visit  06/10/2021   • IMM INFLUENZA (1) 09/01/2021       6.  AHA (Pulse8) form printed for Provider? No, patient does not have any open alerts

## 2021-11-01 ENCOUNTER — OFFICE VISIT (OUTPATIENT)
Dept: MEDICAL GROUP | Facility: MEDICAL CENTER | Age: 76
End: 2021-11-01
Payer: MEDICARE

## 2021-11-01 VITALS
TEMPERATURE: 97.5 F | BODY MASS INDEX: 29.55 KG/M2 | RESPIRATION RATE: 14 BRPM | DIASTOLIC BLOOD PRESSURE: 80 MMHG | WEIGHT: 173.06 LBS | HEIGHT: 64 IN | SYSTOLIC BLOOD PRESSURE: 142 MMHG | OXYGEN SATURATION: 94 % | HEART RATE: 77 BPM

## 2021-11-01 DIAGNOSIS — F51.04 CHRONIC INSOMNIA: ICD-10-CM

## 2021-11-01 DIAGNOSIS — Z97.4 HEARING AID WORN: ICD-10-CM

## 2021-11-01 DIAGNOSIS — N95.1 MENOPAUSAL SYMPTOMS: ICD-10-CM

## 2021-11-01 DIAGNOSIS — F41.8 SITUATIONAL ANXIETY: ICD-10-CM

## 2021-11-01 DIAGNOSIS — N18.31 STAGE 3A CHRONIC KIDNEY DISEASE: ICD-10-CM

## 2021-11-01 DIAGNOSIS — E03.4 HYPOTHYROIDISM DUE TO ACQUIRED ATROPHY OF THYROID: ICD-10-CM

## 2021-11-01 DIAGNOSIS — E78.5 DYSLIPIDEMIA, GOAL LDL BELOW 160: ICD-10-CM

## 2021-11-01 PROCEDURE — 99213 OFFICE O/P EST LOW 20 MIN: CPT | Performed by: FAMILY MEDICINE

## 2021-11-01 RX ORDER — ZOLPIDEM TARTRATE 10 MG/1
10 TABLET ORAL
Qty: 30 TABLET | Refills: 5 | Status: SHIPPED | OUTPATIENT
Start: 2021-11-01 | End: 2022-04-19

## 2021-11-01 RX ORDER — ATORVASTATIN CALCIUM 10 MG/1
10 TABLET, FILM COATED ORAL EVERY EVENING
Qty: 100 TABLET | Refills: 3 | Status: SHIPPED | OUTPATIENT
Start: 2021-11-01 | End: 2021-12-09

## 2021-11-01 RX ORDER — DIAZEPAM 5 MG/1
5 TABLET ORAL EVERY 12 HOURS PRN
Qty: 45 TABLET | Refills: 2 | Status: SHIPPED | OUTPATIENT
Start: 2021-11-01 | End: 2022-01-30

## 2021-11-01 RX ORDER — ESTRADIOL 1 MG/1
1 TABLET ORAL DAILY
Qty: 90 TABLET | Refills: 2 | Status: SHIPPED | OUTPATIENT
Start: 2021-11-01 | End: 2022-05-05 | Stop reason: SDUPTHER

## 2021-11-01 RX ORDER — LEVOTHYROXINE SODIUM 0.05 MG/1
50 TABLET ORAL
Qty: 90 TABLET | Refills: 3 | Status: SHIPPED | OUTPATIENT
Start: 2021-11-01 | End: 2021-12-01

## 2021-11-01 ASSESSMENT — FIBROSIS 4 INDEX: FIB4 SCORE: 1.22

## 2021-11-01 NOTE — PROGRESS NOTES
Chief Complaint   Patient presents with   • Hypothyroidism   • Insomnia   • Dyslipidemia   • Anxiety   • Chronic Kidney Disease       Subjective:     HPI:   Vandana Beaver presents today with the followin. Hypothyroidism due to acquired atrophy of thyroid  Patient reports good energy level on the medication. Patient denies insomnia, tremor or change in appetite.  Patient is taking the medication on an empty stomach in the morning and waiting at least 30 minutes before eating.  Last TSH in August was just above target.    2. Chronic insomnia  She is on stable ambien 10 mg nightly.  PDMP review shows no inconsistencies.    3. Menopausal symptoms  The estradiol continues to be helpful.    4. Dyslipidemia, goal LDL below 160  Patient denies chest pain, chest pressure, palpitations or exertional shortness of breath. Patient denies muscle aches or muscle weakness from the atorvastatin medication. Patient is a never smoker. Patient takes no aspirin daily. Patient has no history of myocardial infarction, stroke or PVD.  Recent cholesterol very good.  The problem is clinically stable.    5. Situational anxiety  The diazepam continues to be helpful.  PDMP review shows no inconsistencies.    6. Stage 3a chronic kidney disease (HCC)  Stable, actually improving.  Not yet normal.        Patient Active Problem List    Diagnosis Date Noted   • Menopausal symptoms 2021   • Hearing aid worn 2021   • Splenic artery aneurysm (HCC) 2021   • Situational anxiety 2021   • Increased intraocular pressure 2017   • Simple hepatic cyst 2017   • History of cataract removal with insertion of prosthetic lens 2016   • CKD (chronic kidney disease) stage 3, GFR 30-59 ml/min ([facility]) 2015   • History of pneumonia 2014   • Hypothyroidism due to acquired atrophy of thyroid    • History of carpal tunnel surgery of right wrist    • [facility] 10/23/2012   • Seasonal allergies    • Chronic  "insomnia    • Dyslipidemia, goal LDL below 160 05/01/2010       Current medicines (including changes today)  Current Outpatient Medications   Medication Sig Dispense Refill   • levothyroxine (SYNTHROID) 50 MCG Tab Take 1 Tablet by mouth every morning on an empty stomach. 90 Tablet 3   • zolpidem (AMBIEN) 10 MG Tab Take 1 Tablet by mouth at bedtime for 180 days. 30 tablets is a 30 day quantity 30 Tablet 5   • estradiol (ESTRACE) 1 MG Tab Take 1 Tablet by mouth every day. 90 Tablet 2   • atorvastatin (LIPITOR) 10 MG Tab Take 1 Tablet by mouth every evening. 100 Tablet 3   • diazePAM (VALIUM) 5 MG Tab Take 1 Tablet by mouth every 12 hours as needed for Anxiety for up to 90 days. 45 Tablet 2   • fluticasone (FLONASE) 50 MCG/ACT nasal spray Spray 1 Spray in nose every day. 1 Bottle 0   • timolol (TIMOPTIC) 0.5 % Solution Place 1 Drop in both eyes 2 times a day.     • Calcium Carbonate-Vitamin D (CALTRATE 600+D PO) Take  by mouth every day.     • KRILL OIL PO Take  by mouth every day.     • Multiple Vitamins-Minerals (MULTIVITAMIN PO) Take  by mouth every day.     • Misc Natural Products (OSTEO BI-FLEX JOINT SHIELD PO) Take  by mouth every day.     • cetirizine (ZYRTEC) 10 MG TABS Take 10 mg by mouth as needed. 30 Tab 3     No current facility-administered medications for this visit.       Allergies   Allergen Reactions   • Codeine Vomiting       ROS: As per HPI       Objective:     /80 (BP Location: Right arm, Patient Position: Sitting, BP Cuff Size: Adult)   Pulse 77   Temp 36.4 °C (97.5 °F) (Temporal)   Resp 14   Ht 1.626 m (5' 4\")   Wt 78.5 kg (173 lb 1 oz)   SpO2 94%  Body mass index is 29.71 kg/m².    Physical Exam:  Constitutional: Well-developed and well-nourished. Not diaphoretic. No distress. Lucid and fluent.  Patient, physician and staff all wearing masks.  Skin: Skin is warm and dry. No rash noted.  Head: Atraumatic without lesions.  Eyes: Conjunctivae and extraocular motions are normal. Pupils " are equal, round, and reactive to light. No scleral icterus.   Ears:  External ears unremarkable.   Neck: Supple, trachea midline. No thyromegaly present. No cervical or supraclavicular lymphadenopathy. No JVD or carotid bruits appreciated  Cardiovascular: Regular rate and rhythm.  Normal S1, S2 without murmur appreciated.  Chest: Effort normal. Clear to auscultation throughout. No adventitious sounds.   Extremities: No cyanosis, clubbing, erythema, nor edema.   Neurological: Alert and oriented x 3. No tremor appreciated.  Psychiatric:  Behavior, mood, and affect are appropriate.       Assessment and Plan:     76 y.o. female with the following issues:    1. Hypothyroidism due to acquired atrophy of thyroid  levothyroxine (SYNTHROID) 50 MCG Tab   2. Chronic insomnia  zolpidem (AMBIEN) 10 MG Tab   3. Menopausal symptoms  estradiol (ESTRACE) 1 MG Tab   4. Dyslipidemia, goal LDL below 160  atorvastatin (LIPITOR) 10 MG Tab   5. Situational anxiety  diazePAM (VALIUM) 5 MG Tab   6. Stage 3a chronic kidney disease (HCC)     7. Hearing aid worn           Followup: Return in about 6 months (around 5/1/2022), or if symptoms worsen or fail to improve.

## 2021-11-15 ENCOUNTER — OFFICE VISIT (OUTPATIENT)
Dept: MEDICAL GROUP | Facility: LAB | Age: 76
End: 2021-11-15
Payer: MEDICARE

## 2021-11-15 DIAGNOSIS — Z23 NEED FOR VACCINATION: ICD-10-CM

## 2021-11-15 NOTE — PROGRESS NOTES
"Vandana Beaver is a 76 y.o. female here for a non-provider visit for:   FLU    Reason for immunization: Annual Flu Vaccine  Immunization records indicate need for vaccine: Yes, confirmed with Epic  Minimum interval has been met for this vaccine: Yes  ABN completed: Yes    VIS Dated   was given to patient: Yes  All IAC Questionnaire questions were answered \"No.\"    Patient tolerated injection and no adverse effects were observed or reported: Yes    Pt scheduled for next dose in series: Not Indicated   "

## 2021-12-01 DIAGNOSIS — E03.4 HYPOTHYROIDISM DUE TO ACQUIRED ATROPHY OF THYROID: ICD-10-CM

## 2021-12-01 RX ORDER — LEVOTHYROXINE SODIUM 50 UG/1
TABLET ORAL
Qty: 90 TABLET | Refills: 0 | Status: SHIPPED | OUTPATIENT
Start: 2021-12-01 | End: 2022-03-14

## 2021-12-01 NOTE — TELEPHONE ENCOUNTER
Received request via: Pharmacy    Was the patient seen in the last year in this department? Yes    Does the patient have an active prescription (recently filled or refills available) for medication(s) requested? No         Never been prescribed before Euthrox

## 2022-01-20 ENCOUNTER — PATIENT MESSAGE (OUTPATIENT)
Dept: HEALTH INFORMATION MANAGEMENT | Facility: OTHER | Age: 77
End: 2022-01-20
Payer: MEDICARE

## 2022-03-13 DIAGNOSIS — E03.4 HYPOTHYROIDISM DUE TO ACQUIRED ATROPHY OF THYROID: ICD-10-CM

## 2022-03-14 RX ORDER — LEVOTHYROXINE SODIUM 50 UG/1
TABLET ORAL
Qty: 90 TABLET | Refills: 2 | Status: SHIPPED | OUTPATIENT
Start: 2022-03-14 | End: 2022-09-15 | Stop reason: SDUPTHER

## 2022-04-14 ENCOUNTER — TELEPHONE (OUTPATIENT)
Dept: HEALTH INFORMATION MANAGEMENT | Facility: OTHER | Age: 77
End: 2022-04-14
Payer: MEDICARE

## 2022-04-26 ENCOUNTER — HOSPITAL ENCOUNTER (OUTPATIENT)
Dept: RADIOLOGY | Facility: MEDICAL CENTER | Age: 77
End: 2022-04-26
Attending: FAMILY MEDICINE
Payer: MEDICARE

## 2022-04-26 DIAGNOSIS — Z12.31 VISIT FOR SCREENING MAMMOGRAM: ICD-10-CM

## 2022-04-26 PROCEDURE — 77063 BREAST TOMOSYNTHESIS BI: CPT

## 2022-05-04 ENCOUNTER — TELEPHONE (OUTPATIENT)
Dept: MEDICAL GROUP | Facility: MEDICAL CENTER | Age: 77
End: 2022-05-04
Payer: MEDICARE

## 2022-05-04 NOTE — TELEPHONE ENCOUNTER
ESTABLISHED PATIENT PRE-VISIT PLANNING     Annual Wellness Visit Over Due      Patient was NOT contacted to complete PVP.     Note: Patient will not be contacted if there is no indication to call.     1.  Reviewed notes from the last few office visits within the medical group: Yes    2.  If any orders were placed at last visit or intended to be done for this visit (i.e. 6 mos follow-up), do we have Results/Consult Notes?         •  Labs - Labs were not ordered at last office visit.   Last office visit with PCP Provider  was on 11/01/2021.  Note: If patient appointment is for lab review and patient did not complete labs, check with provider if OK to reschedule patient until labs completed.       •  Imaging - Imaging ordered, completed and results are in chart.    -MAMMO: Final Result       •  Referrals - No referrals were ordered at last office visit.    3. Is this appointment scheduled as a Hospital Follow-Up? No    4.  Immunizations were updated in Epic using Reconcile Outside Information activity? Yes    5.  Patient is due for the following Health Maintenance Topics:   Health Maintenance Due   Topic Date Due   • IMM ZOSTER VACCINES (2 of 3) 12/31/2013   • Annual Wellness Visit  06/10/2021   • COVID-19 Vaccine (3 - Booster for Pfizer series) 08/03/2021       6.  AHA (Pulse8) form printed for Provider? Yes

## 2022-05-05 ENCOUNTER — OFFICE VISIT (OUTPATIENT)
Dept: MEDICAL GROUP | Facility: MEDICAL CENTER | Age: 77
End: 2022-05-05
Payer: MEDICARE

## 2022-05-05 VITALS
OXYGEN SATURATION: 95 % | WEIGHT: 171.08 LBS | TEMPERATURE: 97.5 F | DIASTOLIC BLOOD PRESSURE: 96 MMHG | HEIGHT: 64 IN | SYSTOLIC BLOOD PRESSURE: 166 MMHG | RESPIRATION RATE: 12 BRPM | BODY MASS INDEX: 29.21 KG/M2 | HEART RATE: 91 BPM

## 2022-05-05 DIAGNOSIS — E78.5 DYSLIPIDEMIA, GOAL LDL BELOW 160: ICD-10-CM

## 2022-05-05 DIAGNOSIS — N95.1 MENOPAUSAL SYMPTOMS: ICD-10-CM

## 2022-05-05 DIAGNOSIS — E03.4 HYPOTHYROIDISM DUE TO ACQUIRED ATROPHY OF THYROID: ICD-10-CM

## 2022-05-05 DIAGNOSIS — Z97.4 HEARING AID WORN: ICD-10-CM

## 2022-05-05 DIAGNOSIS — F51.04 CHRONIC INSOMNIA: ICD-10-CM

## 2022-05-05 DIAGNOSIS — Z91.81 AT RISK FOR FALLING: ICD-10-CM

## 2022-05-05 DIAGNOSIS — S05.12XA EYE BRUISE, LEFT, INITIAL ENCOUNTER: ICD-10-CM

## 2022-05-05 DIAGNOSIS — N18.31 STAGE 3A CHRONIC KIDNEY DISEASE: ICD-10-CM

## 2022-05-05 DIAGNOSIS — R73.01 ELEVATED FASTING GLUCOSE: ICD-10-CM

## 2022-05-05 PROBLEM — I72.8 SPLENIC ARTERY ANEURYSM (HCC): Status: RESOLVED | Noted: 2021-07-14 | Resolved: 2022-05-05

## 2022-05-05 PROBLEM — H40.059 INCREASED INTRAOCULAR PRESSURE: Status: RESOLVED | Noted: 2017-06-06 | Resolved: 2022-05-05

## 2022-05-05 PROCEDURE — 99214 OFFICE O/P EST MOD 30 MIN: CPT | Performed by: FAMILY MEDICINE

## 2022-05-05 RX ORDER — ATORVASTATIN CALCIUM 10 MG/1
10 TABLET, FILM COATED ORAL EVERY EVENING
Qty: 100 TABLET | Refills: 2 | Status: SHIPPED | OUTPATIENT
Start: 2022-05-05 | End: 2022-10-05

## 2022-05-05 RX ORDER — ZOLPIDEM TARTRATE 10 MG/1
10 TABLET ORAL
Qty: 30 TABLET | Refills: 5 | Status: SHIPPED | OUTPATIENT
Start: 2022-05-05 | End: 2022-11-03 | Stop reason: SDUPTHER

## 2022-05-05 RX ORDER — ESTRADIOL 1 MG/1
1 TABLET ORAL DAILY
Qty: 90 TABLET | Refills: 3 | Status: SHIPPED | OUTPATIENT
Start: 2022-05-05 | End: 2022-08-22

## 2022-05-05 RX ORDER — DIAZEPAM 5 MG/1
5 TABLET ORAL EVERY 12 HOURS PRN
COMMUNITY
Start: 2022-04-14 | End: 2022-05-31

## 2022-05-05 ASSESSMENT — FIBROSIS 4 INDEX: FIB4 SCORE: 1.22

## 2022-05-05 NOTE — PROGRESS NOTES
Chief Complaint   Patient presents with   • Medication Refill   • Dyslipidemia   • Chronic Kidney Disease   • Hypothyroidism   • Eye Injury   • Insomnia       Subjective:     HPI:   Vandana Beaver presents today with the followin. Dyslipidemia, goal LDL below 160  Patient denies chest pain, chest pressure, palpitations or exertional shortness of breath. Patient denies muscle aches or muscle weakness from the atorvastatin medication. Patient is a never smoker. Patient takes no aspirin daily. Patient has no history of myocardial infarction, stroke or PVD.  Follow-up lab order discussed and placed    2. Stage 3a chronic kidney disease (HCC)  On her last test kidney function was slightly reduced.  This may have been a hydration issue.  Patient is aware that she often does not drink enough water.    3. Hypothyroidism due to acquired atrophy of thyroid  Patient reports good energy level on the medication. Patient denies insomnia, tremor or change in appetite.  Patient is taking the medication on an empty stomach in the morning and waiting at least 30 minutes before eating.  Last TSH in August last year was in the normal range.  Follow-up order discussed and placed.    4. Elevated fasting glucose  Patient does have mild elevation of fasting glucose on her last testing.  Follow-up order discussed and placed.    5. At risk for falling/Eye bruise, left, initial encounter  She did trip recently and hit her face on the corner of a piece of furniture.  There was no loss of consciousness.  She did have bruising and essentially black eye but that is nearly resolved.  Denies any further symptoms.  Denies any visual change.    6. Menopausal symptoms  Patient states the estradiol continues to be helpful.  She is taking this daily.  She is taking unopposed estrogen.  She had a hysterectomy in the 1980s.    7. Chronic insomnia  Patient is here for refill of her zolpidem.  PDMP review shows no inconsistencies and renewal is  sent.  Patient states the medication continues to be very helpful.    8. Hearing aid worn  She does have hearing aids which have helped somewhat but she still has difficulty understanding people.  She will try and work with the hearing aid specialist again.    She is under considerable personal stress.  She is dealing with this very rationally but it is somewhat overwhelming.      Patient Active Problem List    Diagnosis Date Noted   • Menopausal symptoms 11/01/2021   • Hearing aid worn 11/01/2021   • Situational anxiety 07/14/2021   • Simple hepatic cyst 06/06/2017   • History of cataract removal with insertion of prosthetic lens 09/14/2016   • CKD (chronic kidney disease) stage 3, GFR 30-59 ml/min ([facility]) 05/21/2015   • History of pneumonia 02/19/2014   • Hypothyroidism due to acquired atrophy of thyroid    • History of carpal tunnel surgery of right wrist    • [facility] 10/23/2012   • Seasonal allergies    • Chronic insomnia    • Dyslipidemia, goal LDL below 160 05/01/2010       Current medicines (including changes today)  Current Outpatient Medications   Medication Sig Dispense Refill   • diazePAM (VALIUM) 5 MG Tab Take 5 mg by mouth every 12 hours as needed.     • estradiol (ESTRACE) 1 MG Tab Take 1 Tablet by mouth every day. 90 Tablet 3   • zolpidem (AMBIEN) 10 MG Tab Take 1 Tablet by mouth at bedtime for 180 days. 30 Tablet 5   • atorvastatin (LIPITOR) 10 MG Tab Take 1 Tablet by mouth every evening. 100 Tablet 2   • EUTHYROX 50 MCG Tab TAKE 1 TABLET BY MOUTH IN THE MORNING ON AN EMPTY STOMACH 90 Tablet 2   • fluticasone (FLONASE) 50 MCG/ACT nasal spray Spray 1 Spray in nose every day. 1 Bottle 0   • timolol (TIMOPTIC) 0.5 % Solution Place 1 Drop in both eyes 2 times a day.     • Calcium Carbonate-Vitamin D (CALTRATE 600+D PO) Take  by mouth every day.     • KRILL OIL PO Take  by mouth every day.     • Multiple Vitamins-Minerals (MULTIVITAMIN PO) Take  by mouth every day.     • Misc Natural Products (OSTEO  "BI-FLEX JOINT SHIELD PO) Take  by mouth every day.     • cetirizine (ZYRTEC) 10 MG TABS Take 10 mg by mouth as needed. 30 Tab 3     No current facility-administered medications for this visit.       Allergies   Allergen Reactions   • Codeine Vomiting       ROS: As per HPI       Objective:     BP (!) 166/96 (BP Location: Right arm, Patient Position: Sitting, BP Cuff Size: Adult)   Pulse 91   Temp 36.4 °C (97.5 °F) (Temporal)   Resp 12   Ht 1.626 m (5' 4\")   Wt 77.6 kg (171 lb 1.2 oz)   SpO2 95%  Body mass index is 29.37 kg/m².    Physical Exam:  Constitutional: Well-developed and well-nourished. Not diaphoretic. No distress. Lucid and fluent.  Patient, physician and staff all wearing masks.  Skin: Skin is warm and dry. No rash noted.  Head: Atraumatic without lesions.  Eyes: Conjunctivae and extraocular motions are normal. Pupils are equal, round, and reactive to light. No scleral icterus.   Ears:  External ears unremarkable.   Neck: Supple, trachea midline. No thyromegaly present. No cervical or supraclavicular lymphadenopathy. No JVD or carotid bruits appreciated  Cardiovascular: Regular rate and rhythm.  Normal S1, S2 without murmur appreciated.  Chest: Effort normal. Clear to auscultation throughout. No adventitious sounds.   Abdomen: Soft, non tender, and without distention. Active bowel sounds in all four quadrants. No rebound, guarding, masses or hepatosplenomegaly.  Extremities: No cyanosis, clubbing, erythema, nor edema.   Neurological: Alert and oriented x 3. No tremor appreciated.  Psychiatric:  Behavior, mood, and affect are appropriate.       Assessment and Plan:     76 y.o. female with the following issues:    1. Dyslipidemia, goal LDL below 160  atorvastatin (LIPITOR) 10 MG Tab    Comp Metabolic Panel    Lipid Profile    CBC WITHOUT DIFFERENTIAL   2. Stage 3a chronic kidney disease (HCC)  Comp Metabolic Panel    CBC WITHOUT DIFFERENTIAL   3. Hypothyroidism due to acquired atrophy of thyroid  TSH "   4. Elevated fasting glucose  HEMOGLOBIN A1C   5. At risk for falling  Patient identified as fall risk.  Appropriate orders and counseling given.   6. Eye bruise, left, initial encounter     7. Menopausal symptoms  estradiol (ESTRACE) 1 MG Tab   8. Chronic insomnia  zolpidem (AMBIEN) 10 MG Tab   9. Hearing aid worn           Followup: Return in about 6 months (around 11/5/2022), or if symptoms worsen or fail to improve.

## 2022-05-28 DIAGNOSIS — F51.04 CHRONIC INSOMNIA: ICD-10-CM

## 2022-05-28 DIAGNOSIS — F41.8 SITUATIONAL ANXIETY: ICD-10-CM

## 2022-05-31 RX ORDER — DIAZEPAM 5 MG/1
TABLET ORAL
Qty: 45 TABLET | Refills: 2 | Status: SHIPPED | OUTPATIENT
Start: 2022-05-31 | End: 2022-10-06

## 2022-05-31 NOTE — TELEPHONE ENCOUNTER
PDMP review shows no inconsistencies.  Patient states the diazepam continues to be helpful.  Renewal is placed.

## 2022-08-21 DIAGNOSIS — N95.1 MENOPAUSAL SYMPTOMS: ICD-10-CM

## 2022-08-22 RX ORDER — ESTRADIOL 1 MG/1
TABLET ORAL
Qty: 90 TABLET | Refills: 0 | Status: SHIPPED | OUTPATIENT
Start: 2022-08-22 | End: 2022-11-25

## 2022-09-14 ENCOUNTER — HOSPITAL ENCOUNTER (OUTPATIENT)
Dept: LAB | Facility: MEDICAL CENTER | Age: 77
End: 2022-09-14
Attending: FAMILY MEDICINE
Payer: MEDICARE

## 2022-09-14 DIAGNOSIS — R73.01 ELEVATED FASTING GLUCOSE: ICD-10-CM

## 2022-09-14 DIAGNOSIS — N18.31 STAGE 3A CHRONIC KIDNEY DISEASE: ICD-10-CM

## 2022-09-14 DIAGNOSIS — E03.4 HYPOTHYROIDISM DUE TO ACQUIRED ATROPHY OF THYROID: ICD-10-CM

## 2022-09-14 DIAGNOSIS — E78.5 DYSLIPIDEMIA, GOAL LDL BELOW 160: ICD-10-CM

## 2022-09-14 LAB
ALBUMIN SERPL BCP-MCNC: 3.8 G/DL (ref 3.2–4.9)
ALBUMIN/GLOB SERPL: 1.5 G/DL
ALP SERPL-CCNC: 69 U/L (ref 30–99)
ALT SERPL-CCNC: 12 U/L (ref 2–50)
ANION GAP SERPL CALC-SCNC: 9 MMOL/L (ref 7–16)
AST SERPL-CCNC: 14 U/L (ref 12–45)
BILIRUB SERPL-MCNC: 0.7 MG/DL (ref 0.1–1.5)
BUN SERPL-MCNC: 16 MG/DL (ref 8–22)
CALCIUM SERPL-MCNC: 9.3 MG/DL (ref 8.5–10.5)
CHLORIDE SERPL-SCNC: 110 MMOL/L (ref 96–112)
CHOLEST SERPL-MCNC: 203 MG/DL (ref 100–199)
CO2 SERPL-SCNC: 25 MMOL/L (ref 20–33)
CREAT SERPL-MCNC: 1.16 MG/DL (ref 0.5–1.4)
ERYTHROCYTE [DISTWIDTH] IN BLOOD BY AUTOMATED COUNT: 48.4 FL (ref 35.9–50)
EST. AVERAGE GLUCOSE BLD GHB EST-MCNC: 111 MG/DL
FASTING STATUS PATIENT QL REPORTED: NORMAL
GFR SERPLBLD CREATININE-BSD FMLA CKD-EPI: 48 ML/MIN/1.73 M 2
GLOBULIN SER CALC-MCNC: 2.6 G/DL (ref 1.9–3.5)
GLUCOSE SERPL-MCNC: 98 MG/DL (ref 65–99)
HBA1C MFR BLD: 5.5 % (ref 4–5.6)
HCT VFR BLD AUTO: 43.4 % (ref 37–47)
HDLC SERPL-MCNC: 82 MG/DL
HGB BLD-MCNC: 14.3 G/DL (ref 12–16)
LDLC SERPL CALC-MCNC: 96 MG/DL
MCH RBC QN AUTO: 33.3 PG (ref 27–33)
MCHC RBC AUTO-ENTMCNC: 32.9 G/DL (ref 33.6–35)
MCV RBC AUTO: 100.9 FL (ref 81.4–97.8)
PLATELET # BLD AUTO: 253 K/UL (ref 164–446)
PMV BLD AUTO: 11.6 FL (ref 9–12.9)
POTASSIUM SERPL-SCNC: 4.8 MMOL/L (ref 3.6–5.5)
PROT SERPL-MCNC: 6.4 G/DL (ref 6–8.2)
RBC # BLD AUTO: 4.3 M/UL (ref 4.2–5.4)
SODIUM SERPL-SCNC: 144 MMOL/L (ref 135–145)
TRIGL SERPL-MCNC: 126 MG/DL (ref 0–149)
TSH SERPL DL<=0.005 MIU/L-ACNC: 1.88 UIU/ML (ref 0.38–5.33)
WBC # BLD AUTO: 7 K/UL (ref 4.8–10.8)

## 2022-09-14 PROCEDURE — 84443 ASSAY THYROID STIM HORMONE: CPT

## 2022-09-14 PROCEDURE — 36415 COLL VENOUS BLD VENIPUNCTURE: CPT

## 2022-09-14 PROCEDURE — 80061 LIPID PANEL: CPT

## 2022-09-14 PROCEDURE — 83036 HEMOGLOBIN GLYCOSYLATED A1C: CPT

## 2022-09-14 PROCEDURE — 85027 COMPLETE CBC AUTOMATED: CPT

## 2022-09-14 PROCEDURE — 80053 COMPREHEN METABOLIC PANEL: CPT

## 2022-09-15 DIAGNOSIS — E03.4 HYPOTHYROIDISM DUE TO ACQUIRED ATROPHY OF THYROID: ICD-10-CM

## 2022-09-15 RX ORDER — LEVOTHYROXINE SODIUM 0.05 MG/1
50 TABLET ORAL
Qty: 90 TABLET | Refills: 2 | Status: SHIPPED | OUTPATIENT
Start: 2022-09-15 | End: 2022-12-12

## 2022-10-04 ENCOUNTER — NON-PROVIDER VISIT (OUTPATIENT)
Dept: MEDICAL GROUP | Facility: LAB | Age: 77
End: 2022-10-04
Payer: MEDICARE

## 2022-10-04 DIAGNOSIS — Z23 NEED FOR VACCINATION: ICD-10-CM

## 2022-10-04 PROCEDURE — G0008 ADMIN INFLUENZA VIRUS VAC: HCPCS | Performed by: INTERNAL MEDICINE

## 2022-10-04 PROCEDURE — 90662 IIV NO PRSV INCREASED AG IM: CPT | Performed by: INTERNAL MEDICINE

## 2022-10-04 NOTE — PROGRESS NOTES
"Vandana Beaver is a 77 y.o. female here for a non-provider visit for:   FLU    Reason for immunization: Annual Flu Vaccine  Immunization records indicate need for vaccine: Yes, confirmed with Epic  Minimum interval has been met for this vaccine: Yes  ABN completed: Yes    VIS Dated   was given to patient: Yes  All IAC Questionnaire questions were answered \"No.\"    Patient tolerated injection and no adverse effects were observed or reported: Yes    Pt scheduled for next dose in series: Not Indicated   "

## 2022-10-05 DIAGNOSIS — E78.5 DYSLIPIDEMIA, GOAL LDL BELOW 160: ICD-10-CM

## 2022-10-05 DIAGNOSIS — F51.04 CHRONIC INSOMNIA: ICD-10-CM

## 2022-10-05 DIAGNOSIS — F41.8 SITUATIONAL ANXIETY: ICD-10-CM

## 2022-10-05 RX ORDER — ATORVASTATIN CALCIUM 10 MG/1
TABLET, FILM COATED ORAL
Qty: 100 TABLET | Refills: 0 | Status: SHIPPED | OUTPATIENT
Start: 2022-10-05 | End: 2023-01-16

## 2022-10-06 RX ORDER — DIAZEPAM 5 MG/1
TABLET ORAL
Qty: 45 TABLET | Refills: 0 | Status: SHIPPED | OUTPATIENT
Start: 2022-10-06 | End: 2022-11-28

## 2022-10-06 NOTE — TELEPHONE ENCOUNTER
PDMP review shows no inconsistencies.  Patient was seen within the last 6 months and has a follow-up appointment on November 3.  1 month prescription is sent to her [facility] pharmacy.

## 2022-11-03 ENCOUNTER — OFFICE VISIT (OUTPATIENT)
Dept: MEDICAL GROUP | Facility: MEDICAL CENTER | Age: 77
End: 2022-11-03
Payer: MEDICARE

## 2022-11-03 ENCOUNTER — DOCUMENTATION (OUTPATIENT)
Dept: HEALTH INFORMATION MANAGEMENT | Facility: OTHER | Age: 77
End: 2022-11-03

## 2022-11-03 VITALS
BODY MASS INDEX: 28.15 KG/M2 | DIASTOLIC BLOOD PRESSURE: 82 MMHG | HEIGHT: 64 IN | SYSTOLIC BLOOD PRESSURE: 128 MMHG | OXYGEN SATURATION: 94 % | RESPIRATION RATE: 16 BRPM | HEART RATE: 67 BPM | TEMPERATURE: 97.7 F | WEIGHT: 164.9 LBS

## 2022-11-03 DIAGNOSIS — F51.04 CHRONIC INSOMNIA: ICD-10-CM

## 2022-11-03 DIAGNOSIS — E78.5 DYSLIPIDEMIA, GOAL LDL BELOW 160: ICD-10-CM

## 2022-11-03 DIAGNOSIS — E03.4 HYPOTHYROIDISM DUE TO ACQUIRED ATROPHY OF THYROID: ICD-10-CM

## 2022-11-03 DIAGNOSIS — N18.31 STAGE 3A CHRONIC KIDNEY DISEASE: ICD-10-CM

## 2022-11-03 PROCEDURE — 99213 OFFICE O/P EST LOW 20 MIN: CPT | Performed by: FAMILY MEDICINE

## 2022-11-03 RX ORDER — ZOLPIDEM TARTRATE 10 MG/1
10 TABLET ORAL
Qty: 30 TABLET | Refills: 5 | Status: SHIPPED | OUTPATIENT
Start: 2022-11-03 | End: 2023-04-18

## 2022-11-03 ASSESSMENT — PATIENT HEALTH QUESTIONNAIRE - PHQ9: CLINICAL INTERPRETATION OF PHQ2 SCORE: 0

## 2022-11-03 ASSESSMENT — FIBROSIS 4 INDEX: FIB4 SCORE: 1.23

## 2022-11-03 NOTE — PROGRESS NOTES
Chief Complaint   Patient presents with    Medication Refill     6m fv    Insomnia    Chronic Kidney Disease       Subjective:     HPI:   Vandana Beaver presents today with the followin. Chronic insomnia  She continues to tolerate the zolpidem well.  PDMP review shows no inconsisties.  She and her  have had a very difficult year.  He has had some serious medical issues and at one point they thought they were losing him.  He is now doing better and she feels the zolpidem is starting to work as well as it used to.  It typically gives her 6 to 8hours restful sleep per night.  She denies any aberrant sleep related activity.    2. Stage 3a chronic kidney disease (HCC)  She continues to have stable moderate kidney disease.  Discussed hydration.  She has recently started using a product that allows her to drink a little bit more water.  She continues physical activity on a regular basis.    3. Hypothyroidism due to acquired atrophy of thyroid  Patient reports good energy level on the medication. Patient denies insomnia, tremor or change in appetite.  Patient is taking the medication on an empty stomach in the morning and waiting at least 30 minutes before eating.  Last TSH in September was at target.  She will continue her current dose of thyroid supplement.  She has renewals available.    4. Dyslipidemia, goal LDL below 160  Patient denies chest pain, chest pressure, palpitations or exertional shortness of breath. Patient denies muscle aches or muscle weakness from the atorvastatin medication.  Most recent lipid profile in September showed LDL less than 100 with good triglycerides and HDL.  Patient is a never smoker. Patient takes no aspirin daily. Patient has no history of myocardial infarction, stroke or PVD.         Patient Active Problem List    Diagnosis Date Noted    Menopausal symptoms 2021    Hearing aid worn 2021    Situational anxiety 2021    Simple hepatic cyst 2017  "   History of cataract removal with insertion of prosthetic lens 09/14/2016    CKD (chronic kidney disease) stage 3, GFR 30-59 ml/min ([facility]) 05/21/2015    History of pneumonia 02/19/2014    Hypothyroidism due to acquired atrophy of thyroid     History of carpal tunnel surgery of right wrist     MEDICAL HOME 10/23/2012    Seasonal allergies     Chronic insomnia     Dyslipidemia, goal LDL below 160 05/01/2010       Current medicines (including changes today)  Current Outpatient Medications   Medication Sig Dispense Refill    zolpidem (AMBIEN) 10 MG Tab Take 1 Tablet by mouth at bedtime for 180 days. 30 Tablet 5    diazePAM (VALIUM) 5 MG Tab TAKE 1 TABLET BY MOUTH EVERY 12 HOURS AS NEEDED FOR ANXIETY 45 Tablet 0    atorvastatin (LIPITOR) 10 MG Tab TAKE 1 TABLET BY MOUTH ONCE DAILY IN THE EVENING 100 Tablet 0    levothyroxine (EUTHYROX) 50 MCG Tab Take 1 Tablet by mouth every morning on an empty stomach. 90 Tablet 2    estradiol (ESTRACE) 1 MG Tab Take 1 tablet by mouth once daily 90 Tablet 0    fluticasone (FLONASE) 50 MCG/ACT nasal spray Spray 1 Spray in nose every day. 1 Bottle 0    timolol (TIMOPTIC) 0.5 % Solution Place 1 Drop in both eyes 2 times a day.      Calcium Carbonate-Vitamin D (CALTRATE 600+D PO) Take  by mouth every day.      KRILL OIL PO Take  by mouth every day.      Multiple Vitamins-Minerals (MULTIVITAMIN PO) Take  by mouth every day.      Misc Natural Products (OSTEO BI-FLEX JOINT SHIELD PO) Take  by mouth every day.      cetirizine (ZYRTEC) 10 MG TABS Take 10 mg by mouth as needed. 30 Tab 3     No current facility-administered medications for this visit.       Allergies   Allergen Reactions    Codeine Vomiting       ROS: As per HPI       Objective:     /82 (BP Location: Right arm, Patient Position: Sitting, BP Cuff Size: Small adult)   Pulse 67   Temp 36.5 °C (97.7 °F) (Temporal)   Resp 16   Ht 1.626 m (5' 4\")   Wt 74.8 kg (164 lb 14.5 oz)   SpO2 94%  Body mass index is 28.31 " kg/m².    Physical Exam:  Constitutional: Well-developed and well-nourished. Not diaphoretic. No distress. Lucid and fluent.  Patient, physician and staff all wearing masks.  Skin: Skin is warm and dry. No rash noted.  Head: Atraumatic without lesions.  Eyes: Conjunctivae and extraocular motions are normal. Pupils are equal, round, and reactive to light. No scleral icterus.   Ears:  External ears unremarkable.   Neck: Supple, trachea midline. No thyromegaly present. No cervical or supraclavicular lymphadenopathy. No JVD or carotid bruits appreciated  Cardiovascular: Regular rate and rhythm.  Normal S1, S2 without murmur appreciated.  Chest: Effort normal. Clear to auscultation throughout. No adventitious sounds.   Extremities: No cyanosis, clubbing, erythema, nor edema.   Neurological: Alert and oriented x 3. No tremor appreciated.  Psychiatric:  Behavior, mood, and affect are appropriate.       Assessment and Plan:     77 y.o. female with the following issues:    1. Chronic insomnia  zolpidem (AMBIEN) 10 MG Tab      2. Stage 3a chronic kidney disease (HCC)        3. Hypothyroidism due to acquired atrophy of thyroid        4. Dyslipidemia, goal LDL below 160              Followup: Return in about 6 months (around 5/3/2023), or if symptoms worsen or fail to improve.

## 2022-11-17 ENCOUNTER — OFFICE VISIT (OUTPATIENT)
Dept: URGENT CARE | Facility: CLINIC | Age: 77
End: 2022-11-17
Payer: MEDICARE

## 2022-11-17 ENCOUNTER — HOSPITAL ENCOUNTER (OUTPATIENT)
Facility: MEDICAL CENTER | Age: 77
End: 2022-11-17
Attending: NURSE PRACTITIONER
Payer: MEDICARE

## 2022-11-17 VITALS
RESPIRATION RATE: 14 BRPM | DIASTOLIC BLOOD PRESSURE: 78 MMHG | OXYGEN SATURATION: 94 % | TEMPERATURE: 98.7 F | SYSTOLIC BLOOD PRESSURE: 148 MMHG | BODY MASS INDEX: 27.31 KG/M2 | HEIGHT: 64 IN | HEART RATE: 74 BPM | WEIGHT: 160 LBS

## 2022-11-17 DIAGNOSIS — U07.1 COVID-19: ICD-10-CM

## 2022-11-17 DIAGNOSIS — B97.89 VIRAL RESPIRATORY INFECTION: ICD-10-CM

## 2022-11-17 DIAGNOSIS — Z20.822 CLOSE EXPOSURE TO COVID-19 VIRUS: ICD-10-CM

## 2022-11-17 DIAGNOSIS — J98.8 VIRAL RESPIRATORY INFECTION: ICD-10-CM

## 2022-11-17 LAB
EXTERNAL QUALITY CONTROL: NORMAL
FLUAV+FLUBV AG SPEC QL IA: NEGATIVE
INT CON NEG: NORMAL
INT CON NEG: NORMAL
INT CON POS: NORMAL
INT CON POS: NORMAL
SARS-COV+SARS-COV-2 AG RESP QL IA.RAPID: NEGATIVE

## 2022-11-17 PROCEDURE — U0005 INFEC AGEN DETEC AMPLI PROBE: HCPCS

## 2022-11-17 PROCEDURE — 87804 INFLUENZA ASSAY W/OPTIC: CPT | Performed by: NURSE PRACTITIONER

## 2022-11-17 PROCEDURE — 99213 OFFICE O/P EST LOW 20 MIN: CPT | Mod: CS | Performed by: NURSE PRACTITIONER

## 2022-11-17 PROCEDURE — 87426 SARSCOV CORONAVIRUS AG IA: CPT | Performed by: NURSE PRACTITIONER

## 2022-11-17 PROCEDURE — U0003 INFECTIOUS AGENT DETECTION BY NUCLEIC ACID (DNA OR RNA); SEVERE ACUTE RESPIRATORY SYNDROME CORONAVIRUS 2 (SARS-COV-2) (CORONAVIRUS DISEASE [COVID-19]), AMPLIFIED PROBE TECHNIQUE, MAKING USE OF HIGH THROUGHPUT TECHNOLOGIES AS DESCRIBED BY CMS-2020-01-R: HCPCS

## 2022-11-17 ASSESSMENT — ENCOUNTER SYMPTOMS
VOMITING: 0
HEMOPTYSIS: 0
DIARRHEA: 0
SORE THROAT: 1
SPUTUM PRODUCTION: 0
NAUSEA: 0
SHORTNESS OF BREATH: 0
FEVER: 0
COUGH: 1
WHEEZING: 0

## 2022-11-17 ASSESSMENT — FIBROSIS 4 INDEX: FIB4 SCORE: 1.23

## 2022-11-18 DIAGNOSIS — Z20.822 CLOSE EXPOSURE TO COVID-19 VIRUS: ICD-10-CM

## 2022-11-18 DIAGNOSIS — B97.89 VIRAL RESPIRATORY INFECTION: ICD-10-CM

## 2022-11-18 DIAGNOSIS — J98.8 VIRAL RESPIRATORY INFECTION: ICD-10-CM

## 2022-11-18 NOTE — PATIENT INSTRUCTIONS
Temporarily discontinue atorvastatin during treatment with nirmatrelvir/ritonavir.     Symptomatic care.  -Oral hydration and rest.   -Cough control: nonpharmacologic options for cough relief such as throat lozenges, hot tea, honey.  -Over the counter expectorant as directed; Guaifenesin (Mucinex).  -Tylenol or ibuprofen for pain and fever as directed.   -Warm salt water gargles.  -OTC Throat lozenges or spray (Cepacol).    Seek emergency medical care immediately for: Trouble breathing, persistent pain or pressure in the chest, confusion, inability to wake or stay awake, bluish lips or face, persistent tachycardia (fast heart rate), prolonged dizziness, persistent high grade fevers. Follow up for prolonged cough, persistent wheezing, persistent throat pain, difficulty swallowing, persistent fevers, leg swelling, or any other concerns. Follow up with your Primary Care Provider.

## 2022-11-19 LAB
SARS-COV-2 RNA RESP QL NAA+PROBE: DETECTED
SPECIMEN SOURCE: ABNORMAL

## 2022-11-23 ENCOUNTER — HOSPITAL ENCOUNTER (EMERGENCY)
Facility: MEDICAL CENTER | Age: 77
End: 2022-11-23
Attending: EMERGENCY MEDICINE
Payer: MEDICARE

## 2022-11-23 ENCOUNTER — APPOINTMENT (OUTPATIENT)
Dept: RADIOLOGY | Facility: MEDICAL CENTER | Age: 77
End: 2022-11-23
Attending: EMERGENCY MEDICINE
Payer: MEDICARE

## 2022-11-23 VITALS
BODY MASS INDEX: 27.02 KG/M2 | TEMPERATURE: 97.3 F | HEIGHT: 64 IN | WEIGHT: 158.29 LBS | OXYGEN SATURATION: 95 % | SYSTOLIC BLOOD PRESSURE: 145 MMHG | HEART RATE: 61 BPM | RESPIRATION RATE: 18 BRPM | DIASTOLIC BLOOD PRESSURE: 65 MMHG

## 2022-11-23 DIAGNOSIS — R03.0 ELEVATED BLOOD PRESSURE READING: ICD-10-CM

## 2022-11-23 DIAGNOSIS — R05.1 ACUTE COUGH: ICD-10-CM

## 2022-11-23 DIAGNOSIS — U07.1 COVID-19: ICD-10-CM

## 2022-11-23 LAB
ALBUMIN SERPL BCP-MCNC: 4.2 G/DL (ref 3.2–4.9)
ALBUMIN/GLOB SERPL: 1.6 G/DL
ALP SERPL-CCNC: 73 U/L (ref 30–99)
ALT SERPL-CCNC: 15 U/L (ref 2–50)
ANION GAP SERPL CALC-SCNC: 13 MMOL/L (ref 7–16)
AST SERPL-CCNC: 15 U/L (ref 12–45)
BASOPHILS # BLD AUTO: 0.5 % (ref 0–1.8)
BASOPHILS # BLD: 0.04 K/UL (ref 0–0.12)
BILIRUB SERPL-MCNC: 0.5 MG/DL (ref 0.1–1.5)
BUN SERPL-MCNC: 20 MG/DL (ref 8–22)
CALCIUM SERPL-MCNC: 9 MG/DL (ref 8.4–10.2)
CHLORIDE SERPL-SCNC: 104 MMOL/L (ref 96–112)
CO2 SERPL-SCNC: 23 MMOL/L (ref 20–33)
CREAT SERPL-MCNC: 1.11 MG/DL (ref 0.5–1.4)
EKG IMPRESSION: NORMAL
EOSINOPHIL # BLD AUTO: 0.03 K/UL (ref 0–0.51)
EOSINOPHIL NFR BLD: 0.4 % (ref 0–6.9)
ERYTHROCYTE [DISTWIDTH] IN BLOOD BY AUTOMATED COUNT: 43.3 FL (ref 35.9–50)
GFR SERPLBLD CREATININE-BSD FMLA CKD-EPI: 51 ML/MIN/1.73 M 2
GLOBULIN SER CALC-MCNC: 2.7 G/DL (ref 1.9–3.5)
GLUCOSE SERPL-MCNC: 108 MG/DL (ref 65–99)
HCT VFR BLD AUTO: 44.4 % (ref 37–47)
HGB BLD-MCNC: 14.8 G/DL (ref 12–16)
IMM GRANULOCYTES # BLD AUTO: 0.02 K/UL (ref 0–0.11)
IMM GRANULOCYTES NFR BLD AUTO: 0.3 % (ref 0–0.9)
LYMPHOCYTES # BLD AUTO: 1.67 K/UL (ref 1–4.8)
LYMPHOCYTES NFR BLD: 21.3 % (ref 22–41)
MCH RBC QN AUTO: 32.5 PG (ref 27–33)
MCHC RBC AUTO-ENTMCNC: 33.3 G/DL (ref 33.6–35)
MCV RBC AUTO: 97.4 FL (ref 81.4–97.8)
MONOCYTES # BLD AUTO: 0.61 K/UL (ref 0–0.85)
MONOCYTES NFR BLD AUTO: 7.8 % (ref 0–13.4)
NEUTROPHILS # BLD AUTO: 5.48 K/UL (ref 2–7.15)
NEUTROPHILS NFR BLD: 69.7 % (ref 44–72)
NRBC # BLD AUTO: 0 K/UL
NRBC BLD-RTO: 0 /100 WBC
PLATELET # BLD AUTO: 197 K/UL (ref 164–446)
PMV BLD AUTO: 11.2 FL (ref 9–12.9)
POTASSIUM SERPL-SCNC: 3.8 MMOL/L (ref 3.6–5.5)
PROT SERPL-MCNC: 6.9 G/DL (ref 6–8.2)
RBC # BLD AUTO: 4.56 M/UL (ref 4.2–5.4)
SODIUM SERPL-SCNC: 140 MMOL/L (ref 135–145)
WBC # BLD AUTO: 7.9 K/UL (ref 4.8–10.8)

## 2022-11-23 PROCEDURE — 93005 ELECTROCARDIOGRAM TRACING: CPT

## 2022-11-23 PROCEDURE — 99284 EMERGENCY DEPT VISIT MOD MDM: CPT

## 2022-11-23 PROCEDURE — 80053 COMPREHEN METABOLIC PANEL: CPT

## 2022-11-23 PROCEDURE — 93005 ELECTROCARDIOGRAM TRACING: CPT | Performed by: EMERGENCY MEDICINE

## 2022-11-23 PROCEDURE — 85025 COMPLETE CBC W/AUTO DIFF WBC: CPT

## 2022-11-23 PROCEDURE — 36415 COLL VENOUS BLD VENIPUNCTURE: CPT

## 2022-11-23 ASSESSMENT — FIBROSIS 4 INDEX: FIB4 SCORE: 1.23

## 2022-11-23 NOTE — ED TRIAGE NOTES
"Chief Complaint   Patient presents with    Other     Tested positive on Thursday for COVID       Shortness of Breath     States its getting better but just wants to make sure she is not developing PNA      Pulse 72   Temp 36.2 °C (97.2 °F) (Temporal)   Resp 18   Ht 1.626 m (5' 4\")   Wt 71.8 kg (158 lb 4.6 oz)   SpO2 96%   BMI 27.17 kg/m²     "

## 2022-11-24 DIAGNOSIS — N95.1 MENOPAUSAL SYMPTOMS: ICD-10-CM

## 2022-11-24 NOTE — DISCHARGE INSTRUCTIONS
Have your blood pressure rechecked by your doctor next week.  Take your blood pressure medication when you get home.    You will need to remain in home quarantine until all three of the following are true:  You are 5 days from symptom onset,   your symptoms are improving   you have been fever free for at least  24 hours without taking any Tylenol or ibuprofen.  4.   you will have to wear a mask when around anyone else for 10 days from the onset of symptoms.  If you develop significant shortness of breath, meaning that it is difficult for you to walk even short distances without having to stop and catch your breath, or you become severely dizzy and this is persistent then please return to the emergency department.    I recommend you get a home pulse oximeter.  Return if your oxygenation is less than 90.

## 2022-11-24 NOTE — ED NOTES
Discharged instruction given to patient. Instructed home quarantine isolation.Folow up instruction given. Patient verbalized understanding of the information given. Removed IV line and applied pressure.

## 2022-11-24 NOTE — ED NOTES
"Received from Triage, placed on gown. Attached to monitor. Complaint of shortness of breath opted to come in ED as patient states \"I just wanted to make sure I'm not developing Pneumonia\"  tested covid positive last 11/19/22.   "

## 2022-11-24 NOTE — ED PROVIDER NOTES
ED Provider Note    Scribed for Hema Reyna M.D. by Neeraj Lee. 11/23/2022, 4:58 PM.    Primary care provider: Esteban Camp M.D.  Means of arrival: Walk in  History obtained from: Patient  History limited by: None    CHIEF COMPLAINT  Chief Complaint   Patient presents with    Other     Tested positive on Thursday for COVID       Shortness of Breath     States its getting better but just wants to make sure she is not developing PNA        HPI  Vandana Beaver is a 77 y.o. female who presents to the Emergency Department for evaluation of URI symptoms. Patient states she tested positive for COVID-19 last week. States her symptoms started about one week ago with cough and back soreness from the cough. States her symptoms never became severe, and she feels better now. However, she would like to be checked still because of her history of pneumonia in the past. She denies any nausea, vomiting, or chest pain. States she has history of hypertension for which she is on blood pressure medication. She has not taken her blood pressure medication today yet but plans to take in the evening.     REVIEW OF SYSTEMS  Pertinent positives include cough, back soreness. Pertinent negatives include nausea, vomiting, chest pain. All other systems negative.    PAST MEDICAL HISTORY   has a past medical history of Anesthesia, Carpal tunnel syndrome of right wrist, Cataract, Chronic insomnia, CKD (chronic kidney disease) stage 3, GFR 30-59 ml/min (HCC), Dyslipidemia, goal LDL below 160 (5/2010), History of pneumonia, Hypothyroidism, Increased intraocular pressure (6/6/2017), MEDICAL HOME (10/23/12), Obesity (BMI 30.0-34.9), Pneumonia (1/8/2016), and Seasonal allergies.    SURGICAL HISTORY   has a past surgical history that includes abdominal hysterectomy total (1980s); colonoscopy (10/18/10); hilary by laparoscopy (11/11/2011); tonsillectomy (1960); carpal tunnel release (Right, 4/15/2016); cataract phaco with iol  "(Left, 9/1/2016); cataract phaco with iol (Right, 9/14/2016); and us-needle core bx-breast panel (Left, 02/2018).    SOCIAL HISTORY  Social History     Tobacco Use    Smoking status: Never    Smokeless tobacco: Never   Vaping Use    Vaping Use: Never used   Substance Use Topics    Alcohol use: No     Alcohol/week: 0.0 oz    Drug use: No      Social History     Substance and Sexual Activity   Drug Use No       FAMILY HISTORY  Family History   Problem Relation Age of Onset    Lung Disease Mother         66, heavy smoker, emphysema    Osteoporosis Mother     No Known Problems Father     Diabetes Maternal Grandfather     No Known Problems Daughter     No Known Problems Daughter     No Known Problems Daughter     No Known Problems Son        CURRENT MEDICATIONS  Home Medications    **Home medications have not yet been reviewed for this encounter**         ALLERGIES  Allergies   Allergen Reactions    Codeine Vomiting       PHYSICAL EXAM  VITAL SIGNS: BP (!) 145/65   Pulse 61   Temp 36.3 °C (97.3 °F) (Temporal)   Resp 18   Ht 1.626 m (5' 4\")   Wt 71.8 kg (158 lb 4.6 oz)   SpO2 95%   BMI 27.17 kg/m²   Constitutional: Well developed, Well nourished, No acute distress.   HENT: Normocephalic, Atraumatic, mask in place.  Eyes: Conjunctiva normal, No discharge.   Neck: Supple, No stridor   Cardiovascular: Normal heart rate, Normal rhythm, No murmurs, equal pulses.   Pulmonary: Normal breath sounds, No respiratory distress, No wheezing, No rales, No rhonchi.  Chest: No chest wall tenderness or deformity.   Abdomen:Soft, No tenderness.  Musculoskeletal: No major deformities noted, No tenderness. No calf tenderness. No cords.  Skin: Warm, Dry, No erythema, No rash.   Neurologic: Alert & oriented x 3, Normal motor function,  No focal deficits noted.   Psychiatric: Affect normal, Judgment normal, Mood normal.     LABS  Results for orders placed or performed during the hospital encounter of 11/23/22   CBC with Differential "   Result Value Ref Range    WBC 7.9 4.8 - 10.8 K/uL    RBC 4.56 4.20 - 5.40 M/uL    Hemoglobin 14.8 12.0 - 16.0 g/dL    Hematocrit 44.4 37.0 - 47.0 %    MCV 97.4 81.4 - 97.8 fL    MCH 32.5 27.0 - 33.0 pg    MCHC 33.3 (L) 33.6 - 35.0 g/dL    RDW 43.3 35.9 - 50.0 fL    Platelet Count 197 164 - 446 K/uL    MPV 11.2 9.0 - 12.9 fL    Neutrophils-Polys 69.70 44.00 - 72.00 %    Lymphocytes 21.30 (L) 22.00 - 41.00 %    Monocytes 7.80 0.00 - 13.40 %    Eosinophils 0.40 0.00 - 6.90 %    Basophils 0.50 0.00 - 1.80 %    Immature Granulocytes 0.30 0.00 - 0.90 %    Nucleated RBC 0.00 /100 WBC    Neutrophils (Absolute) 5.48 2.00 - 7.15 K/uL    Lymphs (Absolute) 1.67 1.00 - 4.80 K/uL    Monos (Absolute) 0.61 0.00 - 0.85 K/uL    Eos (Absolute) 0.03 0.00 - 0.51 K/uL    Baso (Absolute) 0.04 0.00 - 0.12 K/uL    Immature Granulocytes (abs) 0.02 0.00 - 0.11 K/uL    NRBC (Absolute) 0.00 K/uL   Comp Metabolic Panel   Result Value Ref Range    Sodium 140 135 - 145 mmol/L    Potassium 3.8 3.6 - 5.5 mmol/L    Chloride 104 96 - 112 mmol/L    Co2 23 20 - 33 mmol/L    Anion Gap 13.0 7.0 - 16.0    Glucose 108 (H) 65 - 99 mg/dL    Bun 20 8 - 22 mg/dL    Creatinine 1.11 0.50 - 1.40 mg/dL    Calcium 9.0 8.4 - 10.2 mg/dL    AST(SGOT) 15 12 - 45 U/L    ALT(SGPT) 15 2 - 50 U/L    Alkaline Phosphatase 73 30 - 99 U/L    Total Bilirubin 0.5 0.1 - 1.5 mg/dL    Albumin 4.2 3.2 - 4.9 g/dL    Total Protein 6.9 6.0 - 8.2 g/dL    Globulin 2.7 1.9 - 3.5 g/dL    A-G Ratio 1.6 g/dL   ESTIMATED GFR   Result Value Ref Range    GFR (CKD-EPI) 51 (A) >60 mL/min/1.73 m 2   EKG   Result Value Ref Range    Report       [facility]    Test Date:  2022  Pt Name:    EDITH BECKER               Department: [facility]  MRN:        [id number]                      Room:  Gender:     Female                       Technician: NATASHA  :        [id number]                   Requested By:ER TRIAGE PROTOCOL  Order #:    [id number]                     Reading MD: KOREY ARNOLD MD    Measurements  Intervals                                Axis  Rate:       66                           P:          78  MI:         179                          QRS:        -27  QRSD:       74                           T:  QT:         532  QTc:        558    Interpretive Statements  Sinus rhythm, rate of 66, leftward axis,  Borderline left axis deviation  Borderline T abnormalities, anterior leads  Prolonged QT interval  Compared to ECG 04/05/2016 11:23:39  T-wave abnormality still present  Electronically Signed On 11- 16:53:12 PST by KOREY ARNOLD MD        All labs reviewed by me.    EKG  See Labs section. Interpreted by me.          COURSE & MEDICAL DECISION MAKING  Pertinent Labs & Imaging studies reviewed. (See chart for details)    4:58 PM - Patient seen and examined at bedside. Ordered estimated GFR, CBC with differential, CMP to evaluate her symptoms. The differential diagnoses include but are not limited to: COVID-19.    Medical Decision Making: Patient presents with COVID she has had this for almost 10 days now.  She is not hypoxic and shows no signs of respiratory distress.  Her lung exam is otherwise clear I do not think she needs a chest x-ray given the fact that her lung exam is clear and she is not hypoxic or showing signs of respiratory distress.  It would not .  Discussed continued symptomatic treatment with the Tessalon Perles she already has.  She did have an elevated blood pressure today I did refer her back to her primary for recheck and to take her blood pressure medication tonight.  Patient is totally asymptomatic with this blood pressure reading.     The patient will return for new or worsening symptoms and is stable at the time of discharge.    The patient is referred to a primary physician for blood pressure management, diabetic screening, and for all other preventative health concerns.    DISPOSITION:  Patient will be  discharged home in stable condition.    FOLLOW UP:  Esteban Camp M.D.  [facility] [address] [place] [id number]  [phone]    Schedule an appointment as soon as possible for a visit in 1 week      OUTPATIENT MEDICATIONS:  Discharge Medication List as of 11/23/2022  5:14 PM            FINAL IMPRESSION  1. Acute cough    2. COVID-19    3. Elevated blood pressure reading          Neeraj BAIRD (Scribe), am scribing for, and in the presence of, Hema Reyna M.D.    Electronically signed by: Neeraj Lee (Scribkarlene), 11/23/2022    Hema BAIRD M.D. personally performed the services described in this documentation, as scribed by Neeraj Lee in my presence, and it is both accurate and complete.    The note accurately reflects work and decisions made by me.  Hema Reyna M.D.  11/23/2022  6:26 PM

## 2022-11-25 DIAGNOSIS — F51.04 CHRONIC INSOMNIA: ICD-10-CM

## 2022-11-25 DIAGNOSIS — F41.8 SITUATIONAL ANXIETY: ICD-10-CM

## 2022-11-25 RX ORDER — ESTRADIOL 1 MG/1
TABLET ORAL
Qty: 90 TABLET | Refills: 3 | Status: SHIPPED | OUTPATIENT
Start: 2022-11-25 | End: 2023-05-15 | Stop reason: SDUPTHER

## 2022-11-28 RX ORDER — DIAZEPAM 5 MG/1
TABLET ORAL
Qty: 45 TABLET | Refills: 0 | Status: SHIPPED | OUTPATIENT
Start: 2022-11-28 | End: 2023-01-16

## 2022-11-28 NOTE — TELEPHONE ENCOUNTER
PDMP review shows no inconsistencies.  Patient does use diazepam when situations are quite problematic.  Her  is very ill with COVID and she is dealing with that herself.  Prescription is sent to her [facility] pharmacy.

## 2022-12-06 DIAGNOSIS — H10.33 ACUTE CONJUNCTIVITIS OF BOTH EYES, UNSPECIFIED ACUTE CONJUNCTIVITIS TYPE: ICD-10-CM

## 2022-12-06 RX ORDER — SULFACETAMIDE SODIUM 100 MG/ML
1 SOLUTION/ DROPS OPHTHALMIC 4 TIMES DAILY
Qty: 5 ML | Refills: 0 | Status: SHIPPED | OUTPATIENT
Start: 2022-12-06 | End: 2023-12-01

## 2022-12-07 DIAGNOSIS — H10.33 ACUTE CONJUNCTIVITIS OF BOTH EYES, UNSPECIFIED ACUTE CONJUNCTIVITIS TYPE: ICD-10-CM

## 2022-12-07 RX ORDER — GENTAMICIN SULFATE 3 MG/ML
1 SOLUTION/ DROPS OPHTHALMIC 4 TIMES DAILY
Qty: 5 ML | Refills: 0 | Status: SHIPPED | OUTPATIENT
Start: 2022-12-07 | End: 2023-12-01

## 2022-12-10 DIAGNOSIS — E03.4 HYPOTHYROIDISM DUE TO ACQUIRED ATROPHY OF THYROID: ICD-10-CM

## 2022-12-12 DIAGNOSIS — E03.4 HYPOTHYROIDISM DUE TO ACQUIRED ATROPHY OF THYROID: ICD-10-CM

## 2022-12-12 RX ORDER — LEVOTHYROXINE SODIUM 0.05 MG/1
TABLET ORAL
Qty: 90 TABLET | Refills: 0 | Status: SHIPPED | OUTPATIENT
Start: 2022-12-12 | End: 2023-12-01 | Stop reason: SDUPTHER

## 2022-12-12 RX ORDER — LEVOTHYROXINE SODIUM 0.05 MG/1
TABLET ORAL
Qty: 90 TABLET | Refills: 0 | Status: SHIPPED | OUTPATIENT
Start: 2022-12-12 | End: 2022-12-12

## 2022-12-20 DIAGNOSIS — B02.9 HERPES ZOSTER WITHOUT COMPLICATION: ICD-10-CM

## 2022-12-20 RX ORDER — ACYCLOVIR 400 MG/1
800 TABLET ORAL 2 TIMES DAILY
Qty: 20 TABLET | Refills: 0 | Status: SHIPPED | OUTPATIENT
Start: 2022-12-20 | End: 2022-12-25

## 2022-12-22 DIAGNOSIS — B02.9 HERPES ZOSTER WITHOUT COMPLICATION: ICD-10-CM

## 2022-12-22 RX ORDER — PREDNISONE 10 MG/1
10 TABLET ORAL 2 TIMES DAILY WITH MEALS
Qty: 10 TABLET | Refills: 0 | Status: SHIPPED | OUTPATIENT
Start: 2022-12-22 | End: 2022-12-27

## 2023-01-12 DIAGNOSIS — E78.5 DYSLIPIDEMIA, GOAL LDL BELOW 160: ICD-10-CM

## 2023-01-12 DIAGNOSIS — F41.8 SITUATIONAL ANXIETY: ICD-10-CM

## 2023-01-12 DIAGNOSIS — F51.04 CHRONIC INSOMNIA: ICD-10-CM

## 2023-01-16 RX ORDER — ATORVASTATIN CALCIUM 10 MG/1
TABLET, FILM COATED ORAL
Qty: 100 TABLET | Refills: 0 | Status: SHIPPED | OUTPATIENT
Start: 2023-01-16 | End: 2023-04-24

## 2023-01-16 RX ORDER — DIAZEPAM 5 MG/1
TABLET ORAL
Qty: 45 TABLET | Refills: 0 | Status: SHIPPED | OUTPATIENT
Start: 2023-01-16 | End: 2023-02-28

## 2023-02-28 DIAGNOSIS — F41.8 SITUATIONAL ANXIETY: ICD-10-CM

## 2023-02-28 DIAGNOSIS — F51.04 CHRONIC INSOMNIA: ICD-10-CM

## 2023-02-28 RX ORDER — DIAZEPAM 5 MG/1
TABLET ORAL
Qty: 45 TABLET | Refills: 0 | Status: SHIPPED | OUTPATIENT
Start: 2023-02-28 | End: 2023-04-17

## 2023-04-17 ENCOUNTER — TELEPHONE (OUTPATIENT)
Dept: MEDICAL GROUP | Facility: MEDICAL CENTER | Age: 78
End: 2023-04-17
Payer: MEDICARE

## 2023-04-18 DIAGNOSIS — F51.04 CHRONIC INSOMNIA: ICD-10-CM

## 2023-04-18 RX ORDER — ZOLPIDEM TARTRATE 10 MG/1
TABLET ORAL
Qty: 30 TABLET | Refills: 0 | Status: SHIPPED | OUTPATIENT
Start: 2023-04-18 | End: 2023-04-20 | Stop reason: SDUPTHER

## 2023-04-20 ENCOUNTER — TELEPHONE (OUTPATIENT)
Dept: MEDICAL GROUP | Facility: MEDICAL CENTER | Age: 78
End: 2023-04-20
Payer: MEDICARE

## 2023-04-20 RX ORDER — ZOLPIDEM TARTRATE 10 MG/1
10 TABLET ORAL
Qty: 30 TABLET | Refills: 0 | Status: SHIPPED | OUTPATIENT
Start: 2023-04-20 | End: 2023-05-15 | Stop reason: SDUPTHER

## 2023-04-24 DIAGNOSIS — E78.5 DYSLIPIDEMIA, GOAL LDL BELOW 160: ICD-10-CM

## 2023-04-24 RX ORDER — ATORVASTATIN CALCIUM 10 MG/1
TABLET, FILM COATED ORAL
Qty: 100 TABLET | Refills: 0 | Status: SHIPPED | OUTPATIENT
Start: 2023-04-24 | End: 2023-07-24

## 2023-04-24 NOTE — TELEPHONE ENCOUNTER
Received request via: Pharmacy    Was the patient seen in the last year in this department? Yes    Does the patient have an active prescription (recently filled or refills available) for medication(s) requested? No    Does the patient have detention Plus and need 100 day supply (blood pressure, diabetes and cholesterol meds only)? Yes, quantity updated to 100 days

## 2023-04-25 ENCOUNTER — TELEPHONE (OUTPATIENT)
Dept: MEDICAL GROUP | Facility: MEDICAL CENTER | Age: 78
End: 2023-04-25
Payer: MEDICARE

## 2023-04-25 DIAGNOSIS — F51.04 CHRONIC INSOMNIA: ICD-10-CM

## 2023-04-25 DIAGNOSIS — F41.8 SITUATIONAL ANXIETY: ICD-10-CM

## 2023-04-25 RX ORDER — DIAZEPAM 5 MG/1
5 TABLET ORAL EVERY 12 HOURS PRN
Qty: 45 TABLET | Refills: 0 | Status: SHIPPED | OUTPATIENT
Start: 2023-04-25 | End: 2023-05-25

## 2023-04-25 NOTE — TELEPHONE ENCOUNTER
The pt has reached out stating she still does not have Diazepam. The pharmacy did not receive the days supply. Please advise and I will call the pharmacy.

## 2023-04-25 NOTE — TELEPHONE ENCOUNTER
My fault, I thought I had put that information on and I did not.  I have now corrected that and have sent it to [facility].

## 2023-05-11 ENCOUNTER — APPOINTMENT (OUTPATIENT)
Dept: MEDICAL GROUP | Facility: MEDICAL CENTER | Age: 78
End: 2023-05-11
Payer: MEDICARE

## 2023-05-15 DIAGNOSIS — F51.04 CHRONIC INSOMNIA: ICD-10-CM

## 2023-05-15 DIAGNOSIS — N95.1 MENOPAUSAL SYMPTOMS: ICD-10-CM

## 2023-05-15 PROCEDURE — 1125F AMNT PAIN NOTED PAIN PRSNT: CPT | Performed by: FAMILY MEDICINE

## 2023-05-15 RX ORDER — ZOLPIDEM TARTRATE 10 MG/1
10 TABLET ORAL
Qty: 30 TABLET | Refills: 0 | Status: SHIPPED | OUTPATIENT
Start: 2023-05-15 | End: 2023-05-30 | Stop reason: SDUPTHER

## 2023-05-15 RX ORDER — ESTRADIOL 1 MG/1
1 TABLET ORAL DAILY
Qty: 90 TABLET | Refills: 3 | Status: SHIPPED | OUTPATIENT
Start: 2023-05-15

## 2023-05-15 NOTE — TELEPHONE ENCOUNTER
Pt has been rescheduled for 5/30 as provider was OOO. Pt is requesting partial refill of two medications to make it through until 5/30.

## 2023-05-30 ENCOUNTER — OFFICE VISIT (OUTPATIENT)
Dept: MEDICAL GROUP | Facility: MEDICAL CENTER | Age: 78
End: 2023-05-30
Payer: MEDICARE

## 2023-05-30 VITALS
DIASTOLIC BLOOD PRESSURE: 72 MMHG | WEIGHT: 162.8 LBS | HEIGHT: 64 IN | TEMPERATURE: 97.7 F | OXYGEN SATURATION: 98 % | BODY MASS INDEX: 27.79 KG/M2 | SYSTOLIC BLOOD PRESSURE: 130 MMHG | HEART RATE: 68 BPM

## 2023-05-30 DIAGNOSIS — N18.31 STAGE 3A CHRONIC KIDNEY DISEASE: ICD-10-CM

## 2023-05-30 DIAGNOSIS — F41.8 SITUATIONAL ANXIETY: ICD-10-CM

## 2023-05-30 DIAGNOSIS — F51.04 CHRONIC INSOMNIA: ICD-10-CM

## 2023-05-30 DIAGNOSIS — E03.4 HYPOTHYROIDISM DUE TO ACQUIRED ATROPHY OF THYROID: ICD-10-CM

## 2023-05-30 DIAGNOSIS — E78.5 DYSLIPIDEMIA, GOAL LDL BELOW 160: ICD-10-CM

## 2023-05-30 PROCEDURE — 3075F SYST BP GE 130 - 139MM HG: CPT | Performed by: FAMILY MEDICINE

## 2023-05-30 PROCEDURE — 99214 OFFICE O/P EST MOD 30 MIN: CPT | Performed by: FAMILY MEDICINE

## 2023-05-30 PROCEDURE — 3078F DIAST BP <80 MM HG: CPT | Performed by: FAMILY MEDICINE

## 2023-05-30 RX ORDER — SERTRALINE HYDROCHLORIDE 25 MG/1
25 TABLET, FILM COATED ORAL DAILY
Qty: 90 TABLET | Refills: 2 | Status: SHIPPED | OUTPATIENT
Start: 2023-05-30 | End: 2023-08-10 | Stop reason: SINTOL

## 2023-05-30 RX ORDER — DIAZEPAM 5 MG/1
5 TABLET ORAL EVERY 12 HOURS PRN
Qty: 45 TABLET | Refills: 0 | Status: SHIPPED | OUTPATIENT
Start: 2023-05-30 | End: 2023-08-10 | Stop reason: SDUPTHER

## 2023-05-30 RX ORDER — ZOLPIDEM TARTRATE 10 MG/1
10 TABLET ORAL
Qty: 30 TABLET | Refills: 5 | Status: SHIPPED | OUTPATIENT
Start: 2023-05-30 | End: 2023-11-26

## 2023-05-30 ASSESSMENT — PATIENT HEALTH QUESTIONNAIRE - PHQ9: CLINICAL INTERPRETATION OF PHQ2 SCORE: 0

## 2023-05-30 ASSESSMENT — FIBROSIS 4 INDEX: FIB4 SCORE: 1.53

## 2023-05-30 NOTE — PROGRESS NOTES
Chief Complaint   Patient presents with    Dyslipidemia     6m fv    Insomnia       Subjective:     HPI:   Vandana Beaver presents today with the followin. Hypothyroidism due to acquired atrophy of thyroid  Patient reports good energy level on the medication. Patient denies insomnia, tremor or change in appetite.  Patient is taking the medication on an empty stomach in the morning and waiting at least 30 minutes before eating.  Last TSH in September was at target.  Lab orders placed.    2. Stage 3a chronic kidney disease (HCC)  Has been stable, follow up order placed.  Discussed with patient.  I do not feel she needs to worry about this.    3. Dyslipidemia, goal LDL below 160  Patient denies chest pain, chest pressure, palpitations or exertional shortness of breath. Patient denies muscle aches or muscle weakness from the atorvastatin medication. Patient is a never smoker. Patient takes no aspirin daily. Patient has no history of myocardial infarction, stroke or PVD.  Lab orders discussed and placed.    4. Situational anxiety/Chronic insomnia  Her  has had many complicated medical challenges over the last year.  This has been quite severe.  She has always had insomnia and anxiety but finds that even the zolpidem can only give her 4 hours of sleep now.  She is often anxious and sad.  They have had several friends die as well.  She is very lucid and rational but is somewhat depressed.  I feel this is mostly situational anxiety and depression.  Sertraline order discussed and placed.  PDMP review shows no inconsistencies.  The zolpidem is renewed.        Patient Active Problem List    Diagnosis Date Noted    Menopausal symptoms 2021    Hearing aid worn 2021    Situational anxiety 2021    Simple hepatic cyst 2017    History of cataract removal with insertion of prosthetic lens 2016    CKD (chronic kidney disease) stage 3, GFR 30-59 ml/min ([facility]) 2015    History of  "pneumonia 02/19/2014    Hypothyroidism due to acquired atrophy of thyroid     History of carpal tunnel surgery of right wrist     MEDICAL HOME 10/23/2012    Seasonal allergies     Chronic insomnia     Dyslipidemia, goal LDL below 160 05/01/2010       Current medicines (including changes today)  Current Outpatient Medications   Medication Sig Dispense Refill    zolpidem (AMBIEN) 10 MG Tab Take 1 Tablet by mouth at bedtime for 180 days. 30 tablets is a 30-day quantity. 30 Tablet 5    sertraline (ZOLOFT) 25 MG tablet Take 1 Tablet by mouth every day. 90 Tablet 2    diazePAM (VALIUM) 5 MG Tab Take 1 Tablet by mouth every 12 hours as needed for Anxiety for up to 30 days. 45 Tablet 0    gentamicin (GARAMYCIN) 0.3 % Solution Administer 1 Drop into both eyes 4 times a day. Use 1 drop both eyes 4 times a day.  Use until the bottle is finished. 5 mL 0    estradiol (ESTRACE) 1 MG Tab Take 1 Tablet by mouth every day. 90 Tablet 3    atorvastatin (LIPITOR) 10 MG Tab TAKE 1 TABLET BY MOUTH ONCE DAILY IN THE EVENING 100 Tablet 0    levothyroxine (SYNTHROID) 50 MCG Tab TAKE 1 TABLET BY MOUTH IN THE MORNING ON AN EMPTY STOMACH 90 Tablet 0    sulfacetamide (SULAMYD) 10 % Solution Administer 1 Drop into both eyes 4 times a day. One drop each eye 4 times daily until gone 5 mL 0    Calcium Carbonate-Vitamin D (CALTRATE 600+D PO) Take  by mouth every day.      Multiple Vitamins-Minerals (MULTIVITAMIN PO) Take  by mouth every day.      cetirizine (ZYRTEC) 10 MG TABS Take 1 Tablet by mouth as needed. 30 Tab 3     No current facility-administered medications for this visit.       Allergies   Allergen Reactions    Codeine Vomiting       ROS: As per HPI       Objective:     /72 (BP Location: Left arm, Patient Position: Sitting, BP Cuff Size: Small adult)   Pulse 68   Temp 36.5 °C (97.7 °F) (Temporal)   Ht 1.626 m (5' 4\")   Wt 73.8 kg (162 lb 12.8 oz)   SpO2 98%  Body mass index is 27.94 kg/m².    Physical Exam:  Constitutional: " Well-developed and well-nourished. Not diaphoretic. No distress. Lucid and fluent.  Skin: Skin is warm and dry. No rash noted.  Head: Atraumatic without lesions.  Eyes: Conjunctivae and extraocular motions are normal. Pupils are equal, round, and reactive to light. No scleral icterus.   Ears:  External ears unremarkable.   Neck: Supple, trachea midline. No thyromegaly present. No cervical or supraclavicular lymphadenopathy. No JVD or carotid bruits appreciated  Cardiovascular: Regular rate and rhythm.  Normal S1, S2 without murmur appreciated.  Chest: Effort normal. Clear to auscultation throughout. No adventitious sounds.   Abdomen: Soft, non tender, and without distention. Active bowel sounds in all four quadrants. No rebound, guarding, masses or hepatosplenomegaly.  Extremities: No cyanosis, clubbing, erythema, nor edema.   Neurological: Alert and oriented x 3.  No tremor appreciated.  Psychiatric:  Behavior, mood, and affect are appropriate.       Assessment and Plan:     78 y.o. female with the following issues:    1. Hypothyroidism due to acquired atrophy of thyroid  TSH      2. Stage 3a chronic kidney disease (HCC)  Comp Metabolic Panel    CBC WITHOUT DIFFERENTIAL      3. Dyslipidemia, goal LDL below 160  Comp Metabolic Panel    Lipid Profile      4. Situational anxiety  sertraline (ZOLOFT) 25 MG tablet    diazePAM (VALIUM) 5 MG Tab      5. Chronic insomnia  zolpidem (AMBIEN) 10 MG Tab    diazePAM (VALIUM) 5 MG Tab            Followup: Return in about 6 months (around 11/30/2023), or if symptoms worsen or fail to improve.

## 2023-06-01 ENCOUNTER — APPOINTMENT (OUTPATIENT)
Dept: RADIOLOGY | Facility: MEDICAL CENTER | Age: 78
End: 2023-06-01
Attending: EMERGENCY MEDICINE
Payer: MEDICARE

## 2023-06-01 ENCOUNTER — HOSPITAL ENCOUNTER (EMERGENCY)
Facility: MEDICAL CENTER | Age: 78
End: 2023-06-01
Attending: EMERGENCY MEDICINE
Payer: MEDICARE

## 2023-06-01 VITALS
SYSTOLIC BLOOD PRESSURE: 150 MMHG | WEIGHT: 162.48 LBS | TEMPERATURE: 97.3 F | BODY MASS INDEX: 27.89 KG/M2 | HEART RATE: 66 BPM | RESPIRATION RATE: 18 BRPM | OXYGEN SATURATION: 96 % | DIASTOLIC BLOOD PRESSURE: 98 MMHG

## 2023-06-01 DIAGNOSIS — S01.81XA FACIAL LACERATION, INITIAL ENCOUNTER: ICD-10-CM

## 2023-06-01 DIAGNOSIS — S02.85XA CLOSED FRACTURE OF ORBITAL WALL, INITIAL ENCOUNTER (HCC): ICD-10-CM

## 2023-06-01 DIAGNOSIS — S09.90XA CLOSED HEAD INJURY, INITIAL ENCOUNTER: ICD-10-CM

## 2023-06-01 PROCEDURE — 700101 HCHG RX REV CODE 250: Performed by: EMERGENCY MEDICINE

## 2023-06-01 PROCEDURE — 99284 EMERGENCY DEPT VISIT MOD MDM: CPT

## 2023-06-01 PROCEDURE — 304999 HCHG REPAIR-SIMPLE/INTERMED LEVEL 1

## 2023-06-01 PROCEDURE — 70486 CT MAXILLOFACIAL W/O DYE: CPT

## 2023-06-01 PROCEDURE — 70450 CT HEAD/BRAIN W/O DYE: CPT

## 2023-06-01 PROCEDURE — 303747 HCHG EXTRA SUTURE

## 2023-06-01 PROCEDURE — 304217 HCHG IRRIGATION SYSTEM

## 2023-06-01 RX ORDER — LIDOCAINE HYDROCHLORIDE AND EPINEPHRINE 10; 10 MG/ML; UG/ML
20 INJECTION, SOLUTION INFILTRATION; PERINEURAL ONCE
Status: COMPLETED | OUTPATIENT
Start: 2023-06-01 | End: 2023-06-01

## 2023-06-01 RX ADMIN — LIDOCAINE HYDROCHLORIDE,EPINEPHRINE BITARTRATE 20 ML: 10; .01 INJECTION, SOLUTION INFILTRATION; PERINEURAL at 16:15

## 2023-06-01 ASSESSMENT — FIBROSIS 4 INDEX: FIB4 SCORE: 1.53

## 2023-06-01 NOTE — ED PROVIDER NOTES
ED Provider Note    CHIEF COMPLAINT  Chief Complaint   Patient presents with    GLF     Pt was working in garden and rocks moved and she fell over. Hit head on ground. Denies LOC or BT. Pt has small laceration noted to left eyelid and swelling noted to left forehead.     Head Injury       EXTERNAL RECORDS REVIEWED  External note the family medicine physician visit Dr. Cervantes on 5/5/2022 for medication refill, dyslipidemia, chronic kidney disease, hypothyroidism, eye injury, insomnia    HPI/ROS    OUTSIDE HISTORIAN(S):  Significant other the patient's  states that the patient did not lose consciousness, she has been acting appropriate since the fall.    Vandana Beaver is a 78 y.o. female who presents with complaint of fall.  The patient was in the garden standing or walking the rock twisted she fell forward hitting her face on the side of the rocks.  She had a laceration above her left eyelid, she had hematoma and slight bleeding.  Denies loss of conscious, neck pain, back pain, abdominal pain, loss of sensation or strength in her arms or legs, visual changes, nausea or vomiting.  She is states that her tetanus booster is up-to-date.  She does not take aspirin, Lasix, anticoagulation none.  She also complains of slight left knee pain and thinks that she might abraded her knee.    PAST MEDICAL HISTORY   has a past medical history of Anesthesia, Carpal tunnel syndrome of right wrist, Cataract, Chronic insomnia, CKD (chronic kidney disease) stage 3, GFR 30-59 ml/min ([facility]), Dyslipidemia, goal LDL below 160 (5/2010), History of pneumonia, Hypothyroidism, Increased intraocular pressure (6/6/2017), MEDICAL HOME (10/23/12), Obesity (BMI 30.0-34.9), Pneumonia (1/8/2016), and Seasonal allergies.    SURGICAL HISTORY   has a past surgical history that includes abdominal hysterectomy total (1980s); colonoscopy (10/18/10); hilary by laparoscopy (11/11/2011); tonsillectomy (1960); carpal tunnel release (Right,  4/15/2016); cataract phaco with iol (Left, 9/1/2016); cataract phaco with iol (Right, 9/14/2016); and us-needle core bx-breast panel (Left, 02/2018).    FAMILY HISTORY  Family History   Problem Relation Age of Onset    Lung Disease Mother         66, heavy smoker, emphysema    Osteoporosis Mother     No Known Problems Father     Diabetes Maternal Grandfather     No Known Problems Daughter     No Known Problems Daughter     No Known Problems Daughter     No Known Problems Son        SOCIAL HISTORY  Social History     Tobacco Use    Smoking status: Never    Smokeless tobacco: Never   Vaping Use    Vaping Use: Never used   Substance and Sexual Activity    Alcohol use: No     Alcohol/week: 0.0 oz    Drug use: No    Sexual activity: Not Currently     Birth control/protection: Post-Menopausal       CURRENT MEDICATIONS  Home Medications       Reviewed by Markos Duggan R.N. (Registered Nurse) on 06/01/23 at 1518  Med List Status: Not Addressed     Medication Last Dose Status   atorvastatin (LIPITOR) 10 MG Tab  Active   Calcium Carbonate-Vitamin D (CALTRATE 600+D PO)  Active   cetirizine (ZYRTEC) 10 MG TABS  Active   diazePAM (VALIUM) 5 MG Tab  Active   estradiol (ESTRACE) 1 MG Tab  Active   gentamicin (GARAMYCIN) 0.3 % Solution  Active   levothyroxine (SYNTHROID) 50 MCG Tab  Active   Multiple Vitamins-Minerals (MULTIVITAMIN PO)  Active   sertraline (ZOLOFT) 25 MG tablet  Active   sulfacetamide (SULAMYD) 10 % Solution  Active   zolpidem (AMBIEN) 10 MG Tab  Active                    ALLERGIES  Allergies   Allergen Reactions    Codeine Vomiting       PHYSICAL EXAM  VITAL SIGNS: BP (!) 150/98   Pulse 66   Temp 36.3 °C (97.3 °F) (Temporal)   Resp 18   Wt 73.7 kg (162 lb 7.7 oz)   SpO2 96%   Breastfeeding No   BMI 27.89 kg/m²      Nursing notes and vitals reviewed.  Constitutional: Well developed, Well nourished, No acute distress, Non-toxic appearance.   Eyes: PERRLA, EOMI, Conjunctiva normal, No discharge.   HENT:  Flap-like laceration 2 cm at the left eyelid, slight active bleeding, hematoma above the left eyelid, hematoma left forehead, no hemotympanum bilaterally   Neck:: No cervical spine tenderness or step-off deformity  cardiovascular: Normal heart rate, Normal rhythm, No murmurs, No rubs, No gallops.   Thorax & Lungs: No respiratory distress, No rales, No rhonchi, No wheezing, No chest tenderness.   Abdomen: Bowel sounds normal, Soft, No tenderness, No guarding, No rebound, No masses, No pulsatile masses.   Skin: Laceration to the left eyelid, hematoma to left upper forehead as well as periorbital, abrasion to anterior aspect the left knee   Musculoskeletal: No thoracic lumbar spine tenderness, pelvis is stable  Extremities: Right tenderness to anterior patella, slight abrasion, full range of motion, no midline joint tenderness, distal pulses are brisk throughout neurologic: Alert & oriented x 3, no focal abnormalities noted, acting appropriately on examination  Psychiatric: Affect normal for clinical presentation.      DIAGNOSTIC STUDIES / PROCEDURES      Laceration Repair Procedure Note    Indication: Laceration    Procedure: The patient was placed in the appropriate position and anesthesia around the laceration was obtained by infiltration using 1% Lidocaine with epinephrine. The wound was minimally contaminated .The area was then irrigated with normal water. The laceration was closed with 6-0 Ethilon using interrupted sutures. A second laceration was closed with 6-0 Ethilon using interrupted sutures. The wound area was then dressed with bacitracin.      Total repaired wound length: 3 cm  Other Items: Suture count: 8 sutures above the eyelid, 1 suture below the large laceration    The patient tolerated the procedure well.    Complications: None   RADIOLOGY  I have independently interpreted the diagnostic imaging associated with this visit and am waiting the final reading from the radiologist.   My preliminary  interpretation is as follows: CT scan of the head shows no evidence of acute intracranial hemorrhage  Radiologist interpretation:   CT-MAXILLOFACIAL W/O PLUS RECONS   Final Result      1.  Left periorbital and forehead contusion.   2.  Nondisplaced left orbital floor fracture.   3.  No retrobulbar hematoma.      CT-HEAD W/O   Final Result      1.  Chronic microvascular ischemic type changes.   2.  No acute intracranial abnormality.   3.  Small left frontal scalp hematoma left periorbital soft tissue swelling.               COURSE & MEDICAL DECISION MAKING    ED Observation Status? Yes; I am placing the patient in to an observation status due to a diagnostic uncertainty as well as therapeutic intensity. Patient placed in observation status at 1550 PM, 6/1/2023.     Observation plan is as follows: CT scan, suture repair, reevaluation    Upon Reevaluation, the patient's condition has: Improved; and will be discharged.    Patient discharged from ED Observation status at 1720 (Time) 6/1/23 (Date).     INITIAL ASSESSMENT, COURSE AND PLAN  Care Narrative: This is a pleasant 78-year-old female presents with ground-level fall while gardening resulting in laceration to her face as well as hematoma to the left side of her face.  Concern for possible oral fracture, facial bone fracture, intracranial hemorrhage.  For this reason CT scan of the head without contrast as well maxillofacial was completed.  CT scan was negative for acute abnormality except for inferior orbital wall fracture, there is no evidence of muscle entrapment, no evidence of free air, no evidence of significant displacement.  The patient's tetanus is up-to-date and the sutures were completed as above.  I did reach out to Dr. Hoover our [facility] physician on-call who states to be happy to come see the patient in the emergency department.  This was discussed with the patient and patient family and they are grateful to wait for the patient's consultation with   Kiko.  Dr. Hoover did come the patient's bedside, evaluate the patient states she is fine for discharge and follow-up in his clinic for further evaluation and management.      ADDITIONAL PROBLEM LIST  Hypothyroidism which is not contributing to her condition currently.  Dyslipidemia her second treatment into her current condition.  Kidney disease which is not contributing to her current condition.  DISPOSITION AND DISCUSSIONS  I have discussed management of the patient with the following physicians and BARBARA's: I discussed the patient with the oral maxillofacial surgeon on-call Dr. Hoover who will be evaluating the patient.      Escalation of care considered, and ultimately not performed:acute inpatient care management, however at this time, the patient is most appropriate for outpatient management        FINAL DIAGNOSIS  1. Closed fracture of orbital wall, initial encounter ([facility])    2. Closed head injury, initial encounter    3. Facial laceration, initial encounter        DISPOSITION:  Patient will be discharged home in stable condition.    FOLLOW UP:  [facility], Emergency Dept  [address]  [facility] [address]  [phone]    If symptoms worsen    Esteban Camp M.D.  [address] [facility] [address]  [facility] [address]  [phone]    Schedule an appointment as soon as possible for a visit   If symptoms worsen    Severino Bruner M.D.  [address] [facility] [id number] [phone]    Schedule an appointment as soon as possible for a visit         OUTPATIENT MEDICATIONS:  Discharge Medication List as of 6/1/2023  6:35 PM            Electronically signed by: Abraham Lovell D.O., 6/1/2023 3:39 PM

## 2023-06-01 NOTE — ED TRIAGE NOTES
Bib  for above complaints.     Chief Complaint   Patient presents with    GLF     Pt was working in garden and rocks moved and she fell over. Hit head on ground. Denies LOC or BT. Pt has small laceration noted to left eyelid and swelling noted to left forehead.     Head Injury     BP (!) 169/100   Pulse 76   Temp 36.1 °C (97 °F) (Temporal)   Resp 16   Wt 73.7 kg (162 lb 7.7 oz)   SpO2 95%   Breastfeeding No   BMI 27.89 kg/m²

## 2023-06-02 NOTE — DISCHARGE INSTRUCTIONS
Laceration Care    Keep the wound moist with bacitracin for at least 5 days,  keep it clean afterwards and protect the wound from sunburn with SPF 50 for 9 months.  Return for evidence of infection streaking, pus from the wound. Followup with your primary care physician, urgent care, or emergency department in 5-7 days for suture removal.   A laceration is a cut or lesion that goes through all layers of the skin and into the tissue just beneath the skin.    This has been repaired by your caregiver. Your caregiver used either suturing, stapling, or steri-strips to repair the laceration. This brings the skin margins together. This allows faster healing.    HOME CARE INSTRUCTIONS  If you were given a dressing, you should change it at least once a day, or as instructed by your caregiver. If the bandage sticks, soak it off with a solution of hydrogen peroxide or soapy water.  Twice a day, wash the area with soap and water to remove all the creams or ointments if these were used. Rinse off the soap. Pat dry with a clean towel. Look for signs of infection (see below).  Re-apply prescribed creams or ointments as instructed. This will help prevent infection. This also helps keep the bandage from sticking. Telfa over the wound and under the dressing or wrap will also help keep the bandage from sticking.  If the bandage becomes wet, dirty, or develops a foul smell, change it as soon as possible.  Acetaminophen (Tylenol®) or ibuprofen (Advil® or Motrin®) may be taken as directed for relief from pain and discomfort.    Seek medical attention IF:  There is redness, swelling, increasing pain in the wound  There is a red line that goes up your arm or leg.  Pus is coming from wound.  You develop an unexplained oral temperature above 101.  You notice a foul smell coming from the wound or dressing.  There is a breaking open of the wound  (edges not staying together) after sutures have been removed.    If you did not receive a tetanus  shot today because you did not recall when your last one was given, make sure to check with your caregiver when you have your sutures removed to determine if you need one.      [employer]® Patient Information ©2007 [employer].

## 2023-06-02 NOTE — OP REPORT
Oral & Facial Surgery   Consultation Note    ID:  Vandana Villatoroyaneli     HPI:  Pt sustained a GLF today while gardening and presented to ED for eval.  Sustained facial lacerations and trauma    Chief Complaint:  Left orbital pain    Exam:   Gen:  AAO x 3, NAD  Head:  Forehead with some left frontal ecchymossi present.  Some tenderness to the area as well.  Eyes:  PERRLA, EOMI, No clinical entrapment.  Left supraorbital lid with well reapired laceration present.  Left periorbital ecchymsos and STS present.  No abnormal intraorcular or subconj heme present.    Ears:  EAC Clear  Nose:  Nares patent, no d/c, no heme  Mouth:  toi >40 Mm, occl S/R.    Throat:  OP Clear    Imaging:   Maxillofacial CT:  Non-Displaced Left orbital floor fracture present.  No signs of entrapment.  No other apparent facial fractures visualized.      Assessment:  Left orbital floor non-displaced fracture - currently stable, no surgical intervention required at this time  Left periorbital laceration - repaired well by ED.    Discussed findings and impressions wt pt.  Discussed need for f/u to reeval floor fracture.  Discussed f/u in 1-2 weeks with pt and she verbalized agreeentn.  All question sanswered.      Plan:    F/U in my office in 2 weeks - my office will call w/in 4 days for appt      Thank You,  Blade Orozco, DDS  [phone]

## 2023-06-02 NOTE — ED NOTES
Pt cleared by MD for d/c  dischg instructions given to pt  verbally understands  d/c'ed to home in NAD     O-Z Plasty Text: The defect edges were debeveled with a #15 scalpel blade.  Given the location of the defect, shape of the defect and the proximity to free margins an O-Z plasty (double transposition flap) was deemed most appropriate.  Using a sterile surgical marker, the appropriate transposition flaps were drawn incorporating the defect and placing the expected incisions within the relaxed skin tension lines where possible.    The area thus outlined was incised deep to adipose tissue with a #15 scalpel blade.  The skin margins were undermined to an appropriate distance in all directions utilizing iris scissors.  Hemostasis was achieved with electrocautery.  The flaps were then transposed into place, one clockwise and the other counterclockwise, and anchored with interrupted buried subcutaneous sutures.

## 2023-06-08 ENCOUNTER — HOSPITAL ENCOUNTER (EMERGENCY)
Facility: MEDICAL CENTER | Age: 78
End: 2023-06-08
Payer: MEDICARE

## 2023-06-08 PROCEDURE — 15853 REMOVAL SUTR/STAPL XREQ ANES: CPT

## 2023-06-08 PROCEDURE — 99281 EMR DPT VST MAYX REQ PHY/QHP: CPT

## 2023-06-12 ENCOUNTER — HOSPITAL ENCOUNTER (OUTPATIENT)
Dept: RADIOLOGY | Facility: MEDICAL CENTER | Age: 78
End: 2023-06-12
Attending: FAMILY MEDICINE
Payer: MEDICARE

## 2023-06-12 DIAGNOSIS — Z12.31 ENCOUNTER FOR SCREENING MAMMOGRAM FOR MALIGNANT NEOPLASM OF BREAST: ICD-10-CM

## 2023-06-12 PROCEDURE — 77063 BREAST TOMOSYNTHESIS BI: CPT

## 2023-06-26 ENCOUNTER — TELEPHONE (OUTPATIENT)
Dept: HEALTH INFORMATION MANAGEMENT | Facility: OTHER | Age: 78
End: 2023-06-26
Payer: MEDICARE

## 2023-07-20 PROBLEM — F13.20 SEDATIVE HYPNOTIC OR ANXIOLYTIC DEPENDENCE (HCC): Status: ACTIVE | Noted: 2023-07-20

## 2023-07-20 PROBLEM — F41.9 ANXIETY: Status: ACTIVE | Noted: 2021-07-14

## 2023-07-24 DIAGNOSIS — E78.5 DYSLIPIDEMIA, GOAL LDL BELOW 160: ICD-10-CM

## 2023-07-24 RX ORDER — ATORVASTATIN CALCIUM 10 MG/1
TABLET, FILM COATED ORAL
Qty: 100 TABLET | Refills: 0 | Status: SHIPPED | OUTPATIENT
Start: 2023-07-24 | End: 2023-11-09

## 2023-08-10 ENCOUNTER — TELEPHONE (OUTPATIENT)
Dept: MEDICAL GROUP | Facility: MEDICAL CENTER | Age: 78
End: 2023-08-10

## 2023-08-10 DIAGNOSIS — F51.04 CHRONIC INSOMNIA: ICD-10-CM

## 2023-08-10 DIAGNOSIS — F41.8 SITUATIONAL ANXIETY: ICD-10-CM

## 2023-08-10 RX ORDER — DIAZEPAM 5 MG/1
5 TABLET ORAL EVERY 12 HOURS PRN
Qty: 45 TABLET | Refills: 2 | Status: SHIPPED | OUTPATIENT
Start: 2023-08-10 | End: 2023-11-08

## 2023-08-10 NOTE — TELEPHONE ENCOUNTER
Patient left a voicemail, you had given her a prescription for Sertraline for her anxiety. She took it for one week and it made her sick and nauseous. So she threw it away and started taking her diazepam again. She is leaving to [place] for a week and would like a refill. Please advise

## 2023-08-10 NOTE — TELEPHONE ENCOUNTER
I hope she is not planning on going to [facility].  I have reviewed the PDMP and have sent a prescription for her diazepam to [facility].  Since I saw her in May we are within the timeframe for that drug.  She will need to see me again in November to continue.

## 2023-09-12 ENCOUNTER — NON-PROVIDER VISIT (OUTPATIENT)
Dept: MEDICAL GROUP | Facility: LAB | Age: 78
End: 2023-09-12
Payer: MEDICARE

## 2023-09-12 DIAGNOSIS — Z23 NEED FOR VACCINATION: ICD-10-CM

## 2023-09-12 PROCEDURE — 90662 IIV NO PRSV INCREASED AG IM: CPT | Performed by: INTERNAL MEDICINE

## 2023-09-12 PROCEDURE — G0008 ADMIN INFLUENZA VIRUS VAC: HCPCS | Performed by: INTERNAL MEDICINE

## 2023-09-12 NOTE — PROGRESS NOTES
"Vandana Beaver is a 78 y.o. female here for a non-provider visit for:   FLU    Reason for immunization: Annual Flu Vaccine  Immunization records indicate need for vaccine: Yes, confirmed with Epic  Minimum interval has been met for this vaccine: Yes  ABN completed: Yes    VIS Dated   was given to patient: Yes  All IAC Questionnaire questions were answered \"No.\"    Patient tolerated injection and no adverse effects were observed or reported: Yes    Pt scheduled for next dose in series: Not Indicated   "

## 2023-10-03 ENCOUNTER — HOSPITAL ENCOUNTER (OUTPATIENT)
Dept: LAB | Facility: MEDICAL CENTER | Age: 78
End: 2023-10-03
Attending: FAMILY MEDICINE
Payer: MEDICARE

## 2023-10-03 DIAGNOSIS — E03.4 HYPOTHYROIDISM DUE TO ACQUIRED ATROPHY OF THYROID: ICD-10-CM

## 2023-10-03 DIAGNOSIS — E78.5 DYSLIPIDEMIA, GOAL LDL BELOW 160: ICD-10-CM

## 2023-10-03 DIAGNOSIS — N18.31 STAGE 3A CHRONIC KIDNEY DISEASE: ICD-10-CM

## 2023-10-03 LAB
ALBUMIN SERPL BCP-MCNC: 4.2 G/DL (ref 3.2–4.9)
ALBUMIN/GLOB SERPL: 1.6 G/DL
ALP SERPL-CCNC: 64 U/L (ref 30–99)
ALT SERPL-CCNC: 9 U/L (ref 2–50)
ANION GAP SERPL CALC-SCNC: 11 MMOL/L (ref 7–16)
AST SERPL-CCNC: 13 U/L (ref 12–45)
BILIRUB SERPL-MCNC: 0.5 MG/DL (ref 0.1–1.5)
BUN SERPL-MCNC: 17 MG/DL (ref 8–22)
CALCIUM ALBUM COR SERPL-MCNC: 9.1 MG/DL (ref 8.5–10.5)
CALCIUM SERPL-MCNC: 9.3 MG/DL (ref 8.4–10.2)
CHLORIDE SERPL-SCNC: 108 MMOL/L (ref 96–112)
CHOLEST SERPL-MCNC: 198 MG/DL (ref 100–199)
CO2 SERPL-SCNC: 23 MMOL/L (ref 20–33)
CREAT SERPL-MCNC: 1.03 MG/DL (ref 0.5–1.4)
ERYTHROCYTE [DISTWIDTH] IN BLOOD BY AUTOMATED COUNT: 42.7 FL (ref 35.9–50)
FASTING STATUS PATIENT QL REPORTED: NORMAL
GFR SERPLBLD CREATININE-BSD FMLA CKD-EPI: 56 ML/MIN/1.73 M 2
GLOBULIN SER CALC-MCNC: 2.6 G/DL (ref 1.9–3.5)
GLUCOSE SERPL-MCNC: 100 MG/DL (ref 65–99)
HCT VFR BLD AUTO: 44 % (ref 37–47)
HDLC SERPL-MCNC: 87 MG/DL
HGB BLD-MCNC: 14.8 G/DL (ref 12–16)
LDLC SERPL CALC-MCNC: 81 MG/DL
MCH RBC QN AUTO: 33 PG (ref 27–33)
MCHC RBC AUTO-ENTMCNC: 33.6 G/DL (ref 32.2–35.5)
MCV RBC AUTO: 98 FL (ref 81.4–97.8)
PLATELET # BLD AUTO: 210 K/UL (ref 164–446)
PMV BLD AUTO: 12 FL (ref 9–12.9)
POTASSIUM SERPL-SCNC: 4.2 MMOL/L (ref 3.6–5.5)
PROT SERPL-MCNC: 6.8 G/DL (ref 6–8.2)
RBC # BLD AUTO: 4.49 M/UL (ref 4.2–5.4)
SODIUM SERPL-SCNC: 142 MMOL/L (ref 135–145)
TRIGL SERPL-MCNC: 152 MG/DL (ref 0–149)
TSH SERPL DL<=0.005 MIU/L-ACNC: 3.68 UIU/ML (ref 0.38–5.33)
WBC # BLD AUTO: 7.8 K/UL (ref 4.8–10.8)

## 2023-10-03 PROCEDURE — 36415 COLL VENOUS BLD VENIPUNCTURE: CPT

## 2023-10-03 PROCEDURE — 80061 LIPID PANEL: CPT

## 2023-10-03 PROCEDURE — 80053 COMPREHEN METABOLIC PANEL: CPT

## 2023-10-03 PROCEDURE — 84443 ASSAY THYROID STIM HORMONE: CPT

## 2023-10-03 PROCEDURE — 85027 COMPLETE CBC AUTOMATED: CPT

## 2023-10-04 ENCOUNTER — TELEPHONE (OUTPATIENT)
Dept: MEDICAL GROUP | Facility: MEDICAL CENTER | Age: 78
End: 2023-10-04
Payer: MEDICARE

## 2023-10-04 NOTE — TELEPHONE ENCOUNTER
Caller Name: Vandana Beaver    Call Back Number: [phone] (home) [phone] (work)      How would the patient prefer to be contacted with a response: Phone call OK to leave a detailed message      Patient is leaving for vacation on Thursday. Needs Dr Camp to call her pharmacy and give a OK for her to refill two days early on her zolpidem.

## 2023-10-04 NOTE — TELEPHONE ENCOUNTER
Patient last seen in May so she is within the 180-day timeframe for a renewal of the zolpidem.  PDMP review shows that she last filled her zolpidem on September 7.  She is leaving on a trip tomorrow which will take over 2 weeks.  She needs to be able to  her zolpidem today.  Could you please call [facility] pharmacy and let them know that we would like her to be able to fill her zolpidem early today due to travel.

## 2023-10-04 NOTE — TELEPHONE ENCOUNTER
Phone Number Called: [phone] (home) [phone] (work)'    Call outcome: Spoke to patient regarding message below.    Message: I called spoke with the pharmacy [facility]. They will have have her med ready in about 30 min.

## 2023-11-09 DIAGNOSIS — E78.5 DYSLIPIDEMIA, GOAL LDL BELOW 160: ICD-10-CM

## 2023-11-09 RX ORDER — ATORVASTATIN CALCIUM 10 MG/1
TABLET, FILM COATED ORAL
Qty: 100 TABLET | Refills: 0 | Status: SHIPPED | OUTPATIENT
Start: 2023-11-09 | End: 2024-02-17

## 2023-12-01 ENCOUNTER — OFFICE VISIT (OUTPATIENT)
Dept: MEDICAL GROUP | Facility: MEDICAL CENTER | Age: 78
End: 2023-12-01
Payer: MEDICARE

## 2023-12-01 VITALS
DIASTOLIC BLOOD PRESSURE: 76 MMHG | SYSTOLIC BLOOD PRESSURE: 116 MMHG | OXYGEN SATURATION: 96 % | BODY MASS INDEX: 28.17 KG/M2 | HEIGHT: 63 IN | WEIGHT: 159 LBS | TEMPERATURE: 98.3 F | HEART RATE: 76 BPM | RESPIRATION RATE: 16 BRPM

## 2023-12-01 DIAGNOSIS — F41.9 CHRONIC ANXIETY: ICD-10-CM

## 2023-12-01 DIAGNOSIS — N18.31 STAGE 3A CHRONIC KIDNEY DISEASE: ICD-10-CM

## 2023-12-01 DIAGNOSIS — E03.4 HYPOTHYROIDISM DUE TO ACQUIRED ATROPHY OF THYROID: ICD-10-CM

## 2023-12-01 DIAGNOSIS — F51.04 CHRONIC INSOMNIA: ICD-10-CM

## 2023-12-01 PROCEDURE — 99213 OFFICE O/P EST LOW 20 MIN: CPT | Performed by: FAMILY MEDICINE

## 2023-12-01 PROCEDURE — 3078F DIAST BP <80 MM HG: CPT | Performed by: FAMILY MEDICINE

## 2023-12-01 PROCEDURE — 3074F SYST BP LT 130 MM HG: CPT | Performed by: FAMILY MEDICINE

## 2023-12-01 RX ORDER — ZOLPIDEM TARTRATE 10 MG/1
10 TABLET ORAL NIGHTLY PRN
COMMUNITY
End: 2023-12-01 | Stop reason: SDUPTHER

## 2023-12-01 RX ORDER — ZOLPIDEM TARTRATE 10 MG/1
10 TABLET ORAL
Qty: 30 TABLET | Refills: 5 | Status: SHIPPED | OUTPATIENT
Start: 2023-12-01 | End: 2024-05-29

## 2023-12-01 RX ORDER — DIAZEPAM 5 MG/1
5 TABLET ORAL EVERY 12 HOURS PRN
COMMUNITY
Start: 2023-11-17 | End: 2024-01-04

## 2023-12-01 RX ORDER — LEVOTHYROXINE SODIUM 0.05 MG/1
50 TABLET ORAL
Qty: 90 TABLET | Refills: 3 | Status: SHIPPED | OUTPATIENT
Start: 2023-12-01

## 2023-12-01 RX ORDER — FLUOXETINE 10 MG/1
10 CAPSULE ORAL DAILY
Qty: 90 CAPSULE | Refills: 3 | Status: SHIPPED | OUTPATIENT
Start: 2023-12-01 | End: 2024-02-27

## 2023-12-01 ASSESSMENT — FIBROSIS 4 INDEX: FIB4 SCORE: 1.61
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
## 2024-01-01 DIAGNOSIS — F41.9 CHRONIC ANXIETY: ICD-10-CM

## 2024-01-04 ENCOUNTER — HOSPITAL ENCOUNTER (EMERGENCY)
Facility: MEDICAL CENTER | Age: 79
End: 2024-01-04
Attending: EMERGENCY MEDICINE
Payer: MEDICARE

## 2024-01-04 VITALS
HEIGHT: 65 IN | DIASTOLIC BLOOD PRESSURE: 98 MMHG | TEMPERATURE: 98 F | RESPIRATION RATE: 18 BRPM | SYSTOLIC BLOOD PRESSURE: 168 MMHG | HEART RATE: 67 BPM | WEIGHT: 239.86 LBS | OXYGEN SATURATION: 99 % | BODY MASS INDEX: 39.96 KG/M2

## 2024-01-04 DIAGNOSIS — H92.02 EAR PAIN, LEFT: ICD-10-CM

## 2024-01-04 PROCEDURE — 99284 EMERGENCY DEPT VISIT MOD MDM: CPT

## 2024-01-04 RX ORDER — DIAZEPAM 5 MG/1
5 TABLET ORAL EVERY 12 HOURS PRN
Qty: 45 TABLET | Refills: 2 | Status: SHIPPED | OUTPATIENT
Start: 2024-01-04 | End: 2024-04-03

## 2024-01-04 ASSESSMENT — FIBROSIS 4 INDEX: FIB4 SCORE: 1.61

## 2024-01-04 NOTE — TELEPHONE ENCOUNTER
Seen in office 12/1/23.  PDMP review shows no inconsistencies.  Diazepam renewal sent to pharmacy.

## 2024-01-04 NOTE — ED PROVIDER NOTES
ED Provider Note    Primary care provider: Esteban Camp M.D.  Means of arrival: private vehicle  History obtained from: patient  History limited by: none    CHIEF COMPLAINT  Ear Foreign Body    HPI/ROS  Vandana Beaver is a 78 y.o. female who presents to the Emergency Department for evaluation of left ear foreign body.  Patient reports that she recently got hearing aids 2 weeks ago.  She reports that the silicone piece of her hearing aid she believes is stuck in her left ear canal is when she removed it from here this morning it was no longer attached to there.  She looked around the floor to see if it was on there but could not find it and believes it may be in her ear still.  Therefore she came in for further evaluation.  Denies any other acute complaints.          PAST MEDICAL HISTORY   has a past medical history of Anesthesia, Carpal tunnel syndrome of right wrist, Cataract, Chronic insomnia, CKD (chronic kidney disease) stage 3, GFR 30-59 ml/min ([facility]), Dyslipidemia, goal LDL below 160 (5/2010), History of pneumonia, Hypothyroidism, Increased intraocular pressure (6/6/2017), MEDICAL HOME (10/23/12), Obesity (BMI 30.0-34.9), Pneumonia (1/8/2016), and Seasonal allergies.    SURGICAL HISTORY   has a past surgical history that includes abdominal hysterectomy total (1980s); colonoscopy (10/18/10); hilary by laparoscopy (11/11/2011); tonsillectomy (1960); carpal tunnel release (Right, 4/15/2016); cataract phaco with iol (Left, 9/1/2016); cataract phaco with iol (Right, 9/14/2016); and us-needle core bx-breast panel (Left, 02/2018).    SOCIAL HISTORY  Social History     Tobacco Use    Smoking status: Never    Smokeless tobacco: Never   Vaping Use    Vaping Use: Never used   Substance Use Topics    Alcohol use: No     Alcohol/week: 0.0 oz    Drug use: No      Social History     Substance and Sexual Activity   Drug Use No       FAMILY HISTORY  Family History   Problem Relation Age of Onset    Lung Disease Mother   "       66, heavy smoker, emphysema    Osteoporosis Mother     No Known Problems Father     Diabetes Maternal Grandfather     No Known Problems Daughter     No Known Problems Daughter     No Known Problems Daughter     No Known Problems Son        CURRENT MEDICATIONS  Home Medications    **Home medications have not yet been reviewed for this encounter**         ALLERGIES  Allergies   Allergen Reactions    Codeine Vomiting       PHYSICAL EXAM  VITAL SIGNS: BP (!) 197/99   Pulse 63   Temp 36.2 °C (97.1 °F) (Temporal)   Resp 17   Ht 1.651 m (5' 5\")   Wt 109 kg (239 lb 13.8 oz)   SpO2 96%   BMI 39.91 kg/m²   Vitals reviewed by myself.  Physical Exam  Nursing note and vitals reviewed.  Constitutional: Well-developed and well-nourished. No acute distress.   HENT: Head is normocephalic and atraumatic.  Bilateral TMs are nonerythematous, no evidence of retained foreign body in the ear canals  Eyes: extra-ocular movements intact  Cardiovascular: Regular rate and  regular rhythm.   Pulmonary/Chest: Breath sounds normal.   Musculoskeletal: Extremities exhibit normal range of motion without edema or tenderness.   Neurological: Awake and alert  Skin: Skin is warm and dry. No rash.         DIAGNOSTIC STUDIES:  LABS  none    COURSE & MEDICAL DECISION MAKING    ED Observation Status? No; Patient does not meet criteria for ED Observation.     INITIAL ASSESSMENT, ED COURSE AND PLAN    Patient is a 78-year-old female who presents for evaluation of concern for foreign body in the left ear.  On exam she is well-appearing, vitals notable for slight hypertension which she has at baseline.  On exam there is no evidence of retained foreign body in the ear.  Bilateral TMs are nonerythematous.  Therefore she is reassured and discharged in stable condition.    DISPOSITION AND DISCUSSIONS  I have discussed management of the patient with the following physicians and BARBARA's:  none    Discussion of management with other QHP or appropriate " source(s): none     Escalation of care considered, and ultimately not performed:see above    Barriers to care at this time, including but not limited to: none.     Decision tools and prescription drugs considered including, but not limited to: see above.    Please see review of records as noted above      FINAL IMPRESSION  1. Ear pain, left

## 2024-02-17 DIAGNOSIS — E78.5 DYSLIPIDEMIA, GOAL LDL BELOW 160: ICD-10-CM

## 2024-02-17 RX ORDER — ATORVASTATIN CALCIUM 10 MG/1
TABLET, FILM COATED ORAL
Qty: 100 TABLET | Refills: 2 | Status: SHIPPED | OUTPATIENT
Start: 2024-02-17

## 2024-02-27 DIAGNOSIS — F41.8 SITUATIONAL ANXIETY: ICD-10-CM

## 2024-02-27 RX ORDER — HYDROXYZINE HYDROCHLORIDE 10 MG/1
10 TABLET, FILM COATED ORAL 3 TIMES DAILY PRN
Qty: 90 TABLET | Refills: 2 | Status: SHIPPED | OUTPATIENT
Start: 2024-02-27

## 2024-02-27 NOTE — PROGRESS NOTES
Patient would like to try Atarax instead of fluoxetine for her situational depression.  She is quite anxious about her 's health, understandably.  Prescription is sent to her [facility] pharmacy.  She has already stopped the fluoxetine.

## 2024-04-10 ENCOUNTER — TELEPHONE (OUTPATIENT)
Dept: HEALTH INFORMATION MANAGEMENT | Facility: OTHER | Age: 79
End: 2024-04-10

## 2024-04-23 ENCOUNTER — APPOINTMENT (OUTPATIENT)
Dept: RADIOLOGY | Facility: MEDICAL CENTER | Age: 79
End: 2024-04-23
Attending: EMERGENCY MEDICINE
Payer: MEDICARE

## 2024-04-23 ENCOUNTER — HOSPITAL ENCOUNTER (OUTPATIENT)
Facility: MEDICAL CENTER | Age: 79
End: 2024-04-25
Attending: EMERGENCY MEDICINE | Admitting: HOSPITALIST
Payer: MEDICARE

## 2024-04-23 DIAGNOSIS — R51.9 ACUTE NONINTRACTABLE HEADACHE, UNSPECIFIED HEADACHE TYPE: ICD-10-CM

## 2024-04-23 DIAGNOSIS — F43.23 ADJUSTMENT DISORDER WITH MIXED ANXIETY AND DEPRESSED MOOD: ICD-10-CM

## 2024-04-23 DIAGNOSIS — R45.851 SUICIDAL IDEATION: ICD-10-CM

## 2024-04-23 DIAGNOSIS — Z97.4 HEARING AID WORN: ICD-10-CM

## 2024-04-23 DIAGNOSIS — I16.0 HYPERTENSIVE URGENCY: ICD-10-CM

## 2024-04-23 DIAGNOSIS — H90.3 SENSORINEURAL HEARING LOSS (SNHL) OF BOTH EARS: ICD-10-CM

## 2024-04-23 DIAGNOSIS — F41.9 ANXIETY: ICD-10-CM

## 2024-04-23 DIAGNOSIS — R20.2 PARESTHESIAS: ICD-10-CM

## 2024-04-23 PROBLEM — R47.81 SLURRED SPEECH: Status: ACTIVE | Noted: 2024-04-23

## 2024-04-23 PROBLEM — I72.9 ANEURYSM (HCC): Status: ACTIVE | Noted: 2024-04-23

## 2024-04-23 PROBLEM — R07.9 CHEST PAIN: Status: ACTIVE | Noted: 2024-04-23

## 2024-04-23 PROBLEM — R94.31 ABNORMAL EKG: Status: ACTIVE | Noted: 2024-04-23

## 2024-04-23 LAB
ABO GROUP BLD: NORMAL
ALBUMIN SERPL BCP-MCNC: 4.2 G/DL (ref 3.2–4.9)
ALBUMIN/GLOB SERPL: 1.4 G/DL
ALP SERPL-CCNC: 67 U/L (ref 30–99)
ALT SERPL-CCNC: 14 U/L (ref 2–50)
AMPHET UR QL SCN: NEGATIVE
ANION GAP SERPL CALC-SCNC: 11 MMOL/L (ref 7–16)
APAP SERPL-MCNC: <5 UG/ML (ref 10–30)
APTT PPP: 29.8 SEC (ref 24.7–36)
AST SERPL-CCNC: 19 U/L (ref 12–45)
BARBITURATES UR QL SCN: NEGATIVE
BASOPHILS # BLD AUTO: 0.6 % (ref 0–1.8)
BASOPHILS # BLD: 0.05 K/UL (ref 0–0.12)
BENZODIAZ UR QL SCN: POSITIVE
BILIRUB SERPL-MCNC: 0.4 MG/DL (ref 0.1–1.5)
BLD GP AB SCN SERPL QL: NORMAL
BUN SERPL-MCNC: 22 MG/DL (ref 8–22)
BZE UR QL SCN: NEGATIVE
CALCIUM ALBUM COR SERPL-MCNC: 9.1 MG/DL (ref 8.5–10.5)
CALCIUM SERPL-MCNC: 9.3 MG/DL (ref 8.4–10.2)
CANNABINOIDS UR QL SCN: NEGATIVE
CHLORIDE SERPL-SCNC: 107 MMOL/L (ref 96–112)
CO2 SERPL-SCNC: 22 MMOL/L (ref 20–33)
CREAT SERPL-MCNC: 1.25 MG/DL (ref 0.5–1.4)
EKG IMPRESSION: NORMAL
EOSINOPHIL # BLD AUTO: 0.1 K/UL (ref 0–0.51)
EOSINOPHIL NFR BLD: 1.2 % (ref 0–6.9)
ERYTHROCYTE [DISTWIDTH] IN BLOOD BY AUTOMATED COUNT: 42.9 FL (ref 35.9–50)
ETHANOL BLD-MCNC: <10.1 MG/DL
FENTANYL UR QL: NEGATIVE
GFR SERPLBLD CREATININE-BSD FMLA CKD-EPI: 44 ML/MIN/1.73 M 2
GLOBULIN SER CALC-MCNC: 3 G/DL (ref 1.9–3.5)
GLUCOSE BLD STRIP.AUTO-MCNC: 92 MG/DL (ref 65–99)
GLUCOSE SERPL-MCNC: 101 MG/DL (ref 65–99)
HCT VFR BLD AUTO: 44.3 % (ref 37–47)
HGB BLD-MCNC: 14.9 G/DL (ref 12–16)
IMM GRANULOCYTES # BLD AUTO: 0.02 K/UL (ref 0–0.11)
IMM GRANULOCYTES NFR BLD AUTO: 0.2 % (ref 0–0.9)
INR PPP: 0.98 (ref 0.87–1.13)
LYMPHOCYTES # BLD AUTO: 1.73 K/UL (ref 1–4.8)
LYMPHOCYTES NFR BLD: 21.5 % (ref 22–41)
MCH RBC QN AUTO: 32.7 PG (ref 27–33)
MCHC RBC AUTO-ENTMCNC: 33.6 G/DL (ref 32.2–35.5)
MCV RBC AUTO: 97.4 FL (ref 81.4–97.8)
METHADONE UR QL SCN: NEGATIVE
MONOCYTES # BLD AUTO: 0.66 K/UL (ref 0–0.85)
MONOCYTES NFR BLD AUTO: 8.2 % (ref 0–13.4)
NEUTROPHILS # BLD AUTO: 5.5 K/UL (ref 1.82–7.42)
NEUTROPHILS NFR BLD: 68.3 % (ref 44–72)
NRBC # BLD AUTO: 0 K/UL
NRBC BLD-RTO: 0 /100 WBC (ref 0–0.2)
OPIATES UR QL SCN: NEGATIVE
OXYCODONE UR QL SCN: NEGATIVE
PCP UR QL SCN: NEGATIVE
PLATELET # BLD AUTO: 180 K/UL (ref 164–446)
PMV BLD AUTO: 11.6 FL (ref 9–12.9)
POTASSIUM SERPL-SCNC: 3.7 MMOL/L (ref 3.6–5.5)
PROPOXYPH UR QL SCN: NEGATIVE
PROT SERPL-MCNC: 7.2 G/DL (ref 6–8.2)
PROTHROMBIN TIME: 13.4 SEC (ref 12–14.6)
RBC # BLD AUTO: 4.55 M/UL (ref 4.2–5.4)
RH BLD: NORMAL
SALICYLATES SERPL-MCNC: <1 MG/DL (ref 15–25)
SODIUM SERPL-SCNC: 140 MMOL/L (ref 135–145)
TROPONIN T SERPL-MCNC: 7 NG/L (ref 6–19)
TROPONIN T SERPL-MCNC: 7 NG/L (ref 6–19)
WBC # BLD AUTO: 8.1 K/UL (ref 4.8–10.8)

## 2024-04-23 PROCEDURE — G0378 HOSPITAL OBSERVATION PER HR: HCPCS

## 2024-04-23 PROCEDURE — 0042T CT-CEREBRAL PERFUSION ANALYSIS: CPT

## 2024-04-23 PROCEDURE — 86901 BLOOD TYPING SEROLOGIC RH(D): CPT

## 2024-04-23 PROCEDURE — 93005 ELECTROCARDIOGRAM TRACING: CPT | Performed by: EMERGENCY MEDICINE

## 2024-04-23 PROCEDURE — 94760 N-INVAS EAR/PLS OXIMETRY 1: CPT

## 2024-04-23 PROCEDURE — 70498 CT ANGIOGRAPHY NECK: CPT

## 2024-04-23 PROCEDURE — 86850 RBC ANTIBODY SCREEN: CPT

## 2024-04-23 PROCEDURE — 84484 ASSAY OF TROPONIN QUANT: CPT

## 2024-04-23 PROCEDURE — 99285 EMERGENCY DEPT VISIT HI MDM: CPT

## 2024-04-23 PROCEDURE — A9270 NON-COVERED ITEM OR SERVICE: HCPCS | Performed by: EMERGENCY MEDICINE

## 2024-04-23 PROCEDURE — 82077 ASSAY SPEC XCP UR&BREATH IA: CPT

## 2024-04-23 PROCEDURE — 36415 COLL VENOUS BLD VENIPUNCTURE: CPT

## 2024-04-23 PROCEDURE — 85730 THROMBOPLASTIN TIME PARTIAL: CPT

## 2024-04-23 PROCEDURE — 82962 GLUCOSE BLOOD TEST: CPT

## 2024-04-23 PROCEDURE — 71045 X-RAY EXAM CHEST 1 VIEW: CPT

## 2024-04-23 PROCEDURE — 70496 CT ANGIOGRAPHY HEAD: CPT

## 2024-04-23 PROCEDURE — 700102 HCHG RX REV CODE 250 W/ 637 OVERRIDE(OP): Performed by: HOSPITALIST

## 2024-04-23 PROCEDURE — 80053 COMPREHEN METABOLIC PANEL: CPT

## 2024-04-23 PROCEDURE — 86900 BLOOD TYPING SEROLOGIC ABO: CPT

## 2024-04-23 PROCEDURE — 85610 PROTHROMBIN TIME: CPT

## 2024-04-23 PROCEDURE — 80307 DRUG TEST PRSMV CHEM ANLYZR: CPT

## 2024-04-23 PROCEDURE — 700117 HCHG RX CONTRAST REV CODE 255: Performed by: EMERGENCY MEDICINE

## 2024-04-23 PROCEDURE — A9270 NON-COVERED ITEM OR SERVICE: HCPCS | Performed by: HOSPITALIST

## 2024-04-23 PROCEDURE — 700102 HCHG RX REV CODE 250 W/ 637 OVERRIDE(OP): Performed by: EMERGENCY MEDICINE

## 2024-04-23 PROCEDURE — 80143 DRUG ASSAY ACETAMINOPHEN: CPT

## 2024-04-23 PROCEDURE — 99223 1ST HOSP IP/OBS HIGH 75: CPT | Performed by: HOSPITALIST

## 2024-04-23 PROCEDURE — 80179 DRUG ASSAY SALICYLATE: CPT

## 2024-04-23 PROCEDURE — 85025 COMPLETE CBC W/AUTO DIFF WBC: CPT

## 2024-04-23 PROCEDURE — 70450 CT HEAD/BRAIN W/O DYE: CPT

## 2024-04-23 RX ORDER — ASPIRIN 325 MG
325 TABLET ORAL ONCE
Status: COMPLETED | OUTPATIENT
Start: 2024-04-23 | End: 2024-04-23

## 2024-04-23 RX ORDER — OXYCODONE HYDROCHLORIDE 5 MG/1
2.5 TABLET ORAL
Status: DISCONTINUED | OUTPATIENT
Start: 2024-04-23 | End: 2024-04-25 | Stop reason: HOSPADM

## 2024-04-23 RX ORDER — HYDROMORPHONE HYDROCHLORIDE 1 MG/ML
0.25 INJECTION, SOLUTION INTRAMUSCULAR; INTRAVENOUS; SUBCUTANEOUS
Status: DISCONTINUED | OUTPATIENT
Start: 2024-04-23 | End: 2024-04-25 | Stop reason: HOSPADM

## 2024-04-23 RX ORDER — POLYETHYLENE GLYCOL 3350 17 G/17G
1 POWDER, FOR SOLUTION ORAL
Status: DISCONTINUED | OUTPATIENT
Start: 2024-04-23 | End: 2024-04-25 | Stop reason: HOSPADM

## 2024-04-23 RX ORDER — AMOXICILLIN 250 MG
2 CAPSULE ORAL 2 TIMES DAILY
Status: DISCONTINUED | OUTPATIENT
Start: 2024-04-23 | End: 2024-04-25 | Stop reason: HOSPADM

## 2024-04-23 RX ORDER — ACETAMINOPHEN 325 MG/1
650 TABLET ORAL EVERY 6 HOURS PRN
Status: DISCONTINUED | OUTPATIENT
Start: 2024-04-23 | End: 2024-04-25 | Stop reason: HOSPADM

## 2024-04-23 RX ORDER — IBUPROFEN 200 MG
400 TABLET ORAL EVERY 6 HOURS PRN
Status: ON HOLD | COMMUNITY
End: 2024-04-25

## 2024-04-23 RX ORDER — OXYCODONE HYDROCHLORIDE 5 MG/1
5 TABLET ORAL
Status: DISCONTINUED | OUTPATIENT
Start: 2024-04-23 | End: 2024-04-25 | Stop reason: HOSPADM

## 2024-04-23 RX ORDER — NITROGLYCERIN 0.4 MG/1
0.4 TABLET SUBLINGUAL
Status: DISCONTINUED | OUTPATIENT
Start: 2024-04-23 | End: 2024-04-25 | Stop reason: HOSPADM

## 2024-04-23 RX ORDER — ATORVASTATIN CALCIUM 10 MG/1
10 TABLET, FILM COATED ORAL EVERY EVENING
Status: DISCONTINUED | OUTPATIENT
Start: 2024-04-23 | End: 2024-04-25 | Stop reason: HOSPADM

## 2024-04-23 RX ORDER — ZOLPIDEM TARTRATE 5 MG/1
10 TABLET ORAL
Status: COMPLETED | OUTPATIENT
Start: 2024-04-23 | End: 2024-04-23

## 2024-04-23 RX ORDER — ASPIRIN 81 MG/1
81 TABLET ORAL DAILY
Status: DISCONTINUED | OUTPATIENT
Start: 2024-04-24 | End: 2024-04-25 | Stop reason: HOSPADM

## 2024-04-23 RX ORDER — LEVOTHYROXINE SODIUM 0.05 MG/1
50 TABLET ORAL NIGHTLY
Status: DISCONTINUED | OUTPATIENT
Start: 2024-04-23 | End: 2024-04-25 | Stop reason: HOSPADM

## 2024-04-23 RX ORDER — HYDRALAZINE HYDROCHLORIDE 20 MG/ML
10 INJECTION INTRAMUSCULAR; INTRAVENOUS EVERY 4 HOURS PRN
Status: DISCONTINUED | OUTPATIENT
Start: 2024-04-23 | End: 2024-04-25 | Stop reason: HOSPADM

## 2024-04-23 RX ORDER — LORAZEPAM 2 MG/ML
1 INJECTION INTRAMUSCULAR EVERY 8 HOURS PRN
Status: ACTIVE | OUTPATIENT
Start: 2024-04-23 | End: 2024-04-24

## 2024-04-23 RX ORDER — DIAZEPAM 5 MG/1
2.5 TABLET ORAL EVERY 8 HOURS PRN
Status: DISCONTINUED | OUTPATIENT
Start: 2024-04-23 | End: 2024-04-25 | Stop reason: HOSPADM

## 2024-04-23 RX ORDER — DIAZEPAM 5 MG/1
2.5 TABLET ORAL EVERY 8 HOURS PRN
COMMUNITY

## 2024-04-23 RX ADMIN — ZOLPIDEM TARTRATE 10 MG: 5 TABLET ORAL at 22:32

## 2024-04-23 RX ADMIN — IOHEXOL 40 ML: 350 INJECTION, SOLUTION INTRAVENOUS at 20:00

## 2024-04-23 RX ADMIN — ASPIRIN 325 MG: 325 TABLET ORAL at 20:48

## 2024-04-23 RX ADMIN — LEVOTHYROXINE SODIUM 50 MCG: 50 TABLET ORAL at 21:48

## 2024-04-23 RX ADMIN — IOHEXOL 80 ML: 350 INJECTION, SOLUTION INTRAVENOUS at 20:02

## 2024-04-23 RX ADMIN — ATORVASTATIN CALCIUM 10 MG: 10 TABLET, FILM COATED ORAL at 21:48

## 2024-04-23 ASSESSMENT — ENCOUNTER SYMPTOMS
FOCAL WEAKNESS: 0
DIZZINESS: 1
SPEECH CHANGE: 1
EYE DISCHARGE: 0
FEVER: 0
VOMITING: 0
FLANK PAIN: 0
BRUISES/BLEEDS EASILY: 0
MYALGIAS: 0
STRIDOR: 0
NERVOUS/ANXIOUS: 1
ABDOMINAL PAIN: 0
SENSORY CHANGE: 1
HEADACHES: 1
SHORTNESS OF BREATH: 1
EYE REDNESS: 0
CHILLS: 0
COUGH: 0

## 2024-04-23 ASSESSMENT — FIBROSIS 4 INDEX: FIB4 SCORE: 1.61

## 2024-04-24 ENCOUNTER — APPOINTMENT (OUTPATIENT)
Dept: RADIOLOGY | Facility: MEDICAL CENTER | Age: 79
End: 2024-04-24
Attending: INTERNAL MEDICINE
Payer: MEDICARE

## 2024-04-24 LAB
ABO + RH BLD: NORMAL
ALBUMIN SERPL BCP-MCNC: 3.8 G/DL (ref 3.2–4.9)
ALBUMIN/GLOB SERPL: 1.5 G/DL
ALP SERPL-CCNC: 62 U/L (ref 30–99)
ALT SERPL-CCNC: 12 U/L (ref 2–50)
ANION GAP SERPL CALC-SCNC: 10 MMOL/L (ref 7–16)
AST SERPL-CCNC: 16 U/L (ref 12–45)
BILIRUB SERPL-MCNC: 0.5 MG/DL (ref 0.1–1.5)
BUN SERPL-MCNC: 17 MG/DL (ref 8–22)
CALCIUM ALBUM COR SERPL-MCNC: 9.3 MG/DL (ref 8.5–10.5)
CALCIUM SERPL-MCNC: 9.1 MG/DL (ref 8.4–10.2)
CHLORIDE SERPL-SCNC: 109 MMOL/L (ref 96–112)
CO2 SERPL-SCNC: 22 MMOL/L (ref 20–33)
CREAT SERPL-MCNC: 1.08 MG/DL (ref 0.5–1.4)
ERYTHROCYTE [DISTWIDTH] IN BLOOD BY AUTOMATED COUNT: 43.7 FL (ref 35.9–50)
GFR SERPLBLD CREATININE-BSD FMLA CKD-EPI: 52 ML/MIN/1.73 M 2
GLOBULIN SER CALC-MCNC: 2.5 G/DL (ref 1.9–3.5)
GLUCOSE SERPL-MCNC: 101 MG/DL (ref 65–99)
HCT VFR BLD AUTO: 41.7 % (ref 37–47)
HGB BLD-MCNC: 14 G/DL (ref 12–16)
MAGNESIUM SERPL-MCNC: 2.1 MG/DL (ref 1.5–2.5)
MCH RBC QN AUTO: 32.5 PG (ref 27–33)
MCHC RBC AUTO-ENTMCNC: 33.6 G/DL (ref 32.2–35.5)
MCV RBC AUTO: 96.8 FL (ref 81.4–97.8)
PLATELET # BLD AUTO: 193 K/UL (ref 164–446)
PMV BLD AUTO: 11.4 FL (ref 9–12.9)
POTASSIUM SERPL-SCNC: 4.2 MMOL/L (ref 3.6–5.5)
PROT SERPL-MCNC: 6.3 G/DL (ref 6–8.2)
RBC # BLD AUTO: 4.31 M/UL (ref 4.2–5.4)
SODIUM SERPL-SCNC: 141 MMOL/L (ref 135–145)
WBC # BLD AUTO: 5.7 K/UL (ref 4.8–10.8)

## 2024-04-24 PROCEDURE — 36415 COLL VENOUS BLD VENIPUNCTURE: CPT

## 2024-04-24 PROCEDURE — 80053 COMPREHEN METABOLIC PANEL: CPT

## 2024-04-24 PROCEDURE — 85027 COMPLETE CBC AUTOMATED: CPT

## 2024-04-24 PROCEDURE — 70551 MRI BRAIN STEM W/O DYE: CPT

## 2024-04-24 PROCEDURE — 99232 SBSQ HOSP IP/OBS MODERATE 35: CPT | Performed by: INTERNAL MEDICINE

## 2024-04-24 PROCEDURE — 700111 HCHG RX REV CODE 636 W/ 250 OVERRIDE (IP): Mod: JZ | Performed by: INTERNAL MEDICINE

## 2024-04-24 PROCEDURE — A9270 NON-COVERED ITEM OR SERVICE: HCPCS | Performed by: HOSPITALIST

## 2024-04-24 PROCEDURE — G0378 HOSPITAL OBSERVATION PER HR: HCPCS

## 2024-04-24 PROCEDURE — 96374 THER/PROPH/DIAG INJ IV PUSH: CPT

## 2024-04-24 PROCEDURE — 700102 HCHG RX REV CODE 250 W/ 637 OVERRIDE(OP): Performed by: HOSPITALIST

## 2024-04-24 PROCEDURE — 83735 ASSAY OF MAGNESIUM: CPT

## 2024-04-24 RX ORDER — DIAZEPAM 5 MG/ML
2.5 INJECTION, SOLUTION INTRAMUSCULAR; INTRAVENOUS ONCE
Status: COMPLETED | OUTPATIENT
Start: 2024-04-24 | End: 2024-04-24

## 2024-04-24 RX ORDER — ZOLPIDEM TARTRATE 5 MG/1
10 TABLET ORAL NIGHTLY PRN
Status: DISCONTINUED | OUTPATIENT
Start: 2024-04-24 | End: 2024-04-25 | Stop reason: HOSPADM

## 2024-04-24 RX ADMIN — ZOLPIDEM TARTRATE 10 MG: 5 TABLET ORAL at 21:38

## 2024-04-24 RX ADMIN — DIAZEPAM 2.5 MG: 5 INJECTION, SOLUTION INTRAMUSCULAR; INTRAVENOUS at 18:27

## 2024-04-24 RX ADMIN — RIVAROXABAN 10 MG: 10 TABLET, FILM COATED ORAL at 17:34

## 2024-04-24 RX ADMIN — LEVOTHYROXINE SODIUM 50 MCG: 50 TABLET ORAL at 21:38

## 2024-04-24 RX ADMIN — DIAZEPAM 2.5 MG: 5 TABLET ORAL at 21:37

## 2024-04-24 RX ADMIN — ASPIRIN 81 MG: 81 TABLET, COATED ORAL at 06:00

## 2024-04-24 RX ADMIN — ATORVASTATIN CALCIUM 10 MG: 10 TABLET, FILM COATED ORAL at 17:34

## 2024-04-24 ASSESSMENT — COGNITIVE AND FUNCTIONAL STATUS - GENERAL
SUGGESTED CMS G CODE MODIFIER DAILY ACTIVITY: CH
SUGGESTED CMS G CODE MODIFIER MOBILITY: CH
MOBILITY SCORE: 24
DAILY ACTIVITIY SCORE: 24

## 2024-04-24 ASSESSMENT — LIFESTYLE VARIABLES
EVER FELT BAD OR GUILTY ABOUT YOUR DRINKING: NO
HOW MANY TIMES IN THE PAST YEAR HAVE YOU HAD 5 OR MORE DRINKS IN A DAY: 0
EVER HAD A DRINK FIRST THING IN THE MORNING TO STEADY YOUR NERVES TO GET RID OF A HANGOVER: NO
ALCOHOL_USE: NO
TOTAL SCORE: 0
CONSUMPTION TOTAL: NEGATIVE
AVERAGE NUMBER OF DAYS PER WEEK YOU HAVE A DRINK CONTAINING ALCOHOL: 0
HAVE PEOPLE ANNOYED YOU BY CRITICIZING YOUR DRINKING: NO
TOTAL SCORE: 0
HAVE YOU EVER FELT YOU SHOULD CUT DOWN ON YOUR DRINKING: NO
TOTAL SCORE: 0
ON A TYPICAL DAY WHEN YOU DRINK ALCOHOL HOW MANY DRINKS DO YOU HAVE: 0

## 2024-04-24 ASSESSMENT — ENCOUNTER SYMPTOMS
INSOMNIA: 1
NERVOUS/ANXIOUS: 1

## 2024-04-24 ASSESSMENT — PATIENT HEALTH QUESTIONNAIRE - PHQ9
SUM OF ALL RESPONSES TO PHQ9 QUESTIONS 1 AND 2: 0
2. FEELING DOWN, DEPRESSED, IRRITABLE, OR HOPELESS: NOT AT ALL
1. LITTLE INTEREST OR PLEASURE IN DOING THINGS: NOT AT ALL

## 2024-04-24 ASSESSMENT — PAIN DESCRIPTION - PAIN TYPE: TYPE: ACUTE PAIN

## 2024-04-24 ASSESSMENT — FIBROSIS 4 INDEX: FIB4 SCORE: 1.87

## 2024-04-24 NOTE — ASSESSMENT & PLAN NOTE
No known history of primary hypertension  Likely secondary to severe anxiety/panic attack  I will start anxiolytic medications  I will start as needed hydralazine for extreme hypertension  Consider starting scheduled antihypertensive medications according to blood pressure trend

## 2024-04-24 NOTE — ASSESSMENT & PLAN NOTE
Patient appears to have severe anxiety and had a panic attack at home.  She has had a significant amount of stressors at home.  - As needed Valium

## 2024-04-24 NOTE — H&P
"Hospital Medicine History & Physical Note    Date of Service  4/23/2024    Primary Care Physician  Esteban Camp M.D.    Consultants  None     Code Status  Full Code    Chief Complaint  Chief Complaint   Patient presents with    Anxiety     C/o sudden onset crying, difficulty breathing, feeling numb in RH and HA   Started about one hour PTA    Headache     Pt states continues to have Right side HA  Reports hx of HA    Suicidal Ideation     Pt states has thought about taking more sleeping pills than prescribed, states \"cannot handle any more pressure\"     History of Presenting Illness  Vandana Beaver is a 78 y.o. female with a past medical history of hypothyroidism, chronic kidney disease and hyperlipidemia with severe anxiety who presented 4/23/2024 with severe anxiety, headache, chest pain, palpitations and tingling in both of her upper extremities.  Chest pain and tingling of her limbs resolved after her anxiety/panic attacks improved.  Patient denies losing consciousness or having abnormal jerky movements of her limbs.  Patient reports that her anxiety has been increasing because she has been taking care of her  who has multiple worsening problems.     I discussed the plan of care with emergency physician, the patient and patient daughter present at bedside in the emergency room.    Review of Systems  Review of Systems   Constitutional:  Negative for chills and fever.   Eyes:  Negative for discharge and redness.   Respiratory:  Positive for shortness of breath. Negative for cough and stridor.    Cardiovascular:  Positive for chest pain. Negative for leg swelling.   Gastrointestinal:  Negative for abdominal pain and vomiting.   Genitourinary:  Negative for flank pain.   Musculoskeletal:  Negative for myalgias.   Skin: Negative.    Neurological:  Positive for dizziness, sensory change, speech change and headaches. Negative for focal weakness.   Endo/Heme/Allergies:  Does not bruise/bleed easily. "   Psychiatric/Behavioral:  The patient is nervous/anxious.      Past Medical History   has a past medical history of Anesthesia, Carpal tunnel syndrome of right wrist, Cataract, Chronic insomnia, CKD (chronic kidney disease) stage 3, GFR 30-59 ml/min, Dyslipidemia, goal LDL below 160 (5/2010), History of pneumonia, Hypothyroidism, Increased intraocular pressure (6/6/2017), MEDICAL HOME (10/23/12), Obesity (BMI 30.0-34.9), Pneumonia (1/8/2016), and Seasonal allergies.    Surgical History   has a past surgical history that includes abdominal hysterectomy total (1980s); colonoscopy (10/18/10); hilary by laparoscopy (11/11/2011); tonsillectomy (1960); carpal tunnel release (Right, 4/15/2016); cataract phaco with iol (Left, 9/1/2016); cataract phaco with iol (Right, 9/14/2016); and us-needle core bx-breast panel (Left, 02/2018).     Family History  family history includes Diabetes in her maternal grandfather; Lung Disease in her mother; No Known Problems in her daughter, daughter, daughter, father, and son; Osteoporosis in her mother.      Social History   reports that she has never smoked. She has never used smokeless tobacco. She reports that she does not drink alcohol and does not use drugs.    Allergies  Allergies   Allergen Reactions    Codeine Vomiting     Medications  Prior to Admission Medications   Prescriptions Last Dose Informant Patient Reported? Taking?   Multiple Vitamins-Minerals (MULTIVITAMIN PO)  Patient Yes No   Sig: Take  by mouth every day.   atorvastatin (LIPITOR) 10 MG Tab   No No   Sig: TAKE 1 TABLET BY MOUTH ONCE DAILY IN THE EVENING   estradiol (ESTRACE) 1 MG Tab   No No   Sig: Take 1 Tablet by mouth every day.   hydrOXYzine HCl (ATARAX) 10 MG Tab   No No   Sig: Take 1 Tablet by mouth 3 times a day as needed for Anxiety.   levothyroxine (SYNTHROID) 50 MCG Tab   No No   Sig: Take 1 Tablet by mouth every morning on an empty stomach.   zolpidem (AMBIEN) 10 MG Tab   No No   Sig: Take 1 Tablet by mouth  at bedtime for 180 days. 30 tablets is a 30 day quantity      Facility-Administered Medications: None     Physical Exam  Temp:  [36.6 °C (97.8 °F)] 36.6 °C (97.8 °F)  Pulse:  [67-74] 74  Resp:  [15-16] 16  BP: (169-205)/() 184/85  SpO2:  [94 %-98 %] 94 %  Blood Pressure : (!) 205/77   Temperature: 36.6 °C (97.8 °F)   Pulse: 69   Respiration: 15   Pulse Oximetry: 97 %     Physical Exam  Constitutional:       General: She is not in acute distress.  HENT:      Head: Normocephalic and atraumatic.      Right Ear: External ear normal.      Left Ear: External ear normal.      Nose: No congestion or rhinorrhea.      Mouth/Throat:      Mouth: Mucous membranes are moist.      Pharynx: No oropharyngeal exudate or posterior oropharyngeal erythema.   Eyes:      General: No scleral icterus.        Right eye: No discharge.         Left eye: No discharge.      Conjunctiva/sclera: Conjunctivae normal.      Pupils: Pupils are equal, round, and reactive to light.   Cardiovascular:      Rate and Rhythm: Normal rate and regular rhythm.      Heart sounds:      No friction rub. No gallop.   Pulmonary:      Effort: Pulmonary effort is normal.   Abdominal:      General: Abdomen is flat. There is no distension.      Tenderness: There is no guarding.   Musculoskeletal:         General: No swelling.      Cervical back: Neck supple. No rigidity. No muscular tenderness.      Right lower leg: No edema.      Left lower leg: No edema.   Skin:     Capillary Refill: Capillary refill takes 2 to 3 seconds.      Coloration: Skin is not jaundiced or pale.      Findings: No bruising or erythema.   Neurological:      Mental Status: She is alert and oriented to person, place, and time.      Comments: Speech is fluent.  Power is 5/5 in both upper and lower extremities.   Psychiatric:      Comments: Anxious mood.       Laboratory:  Recent Labs     04/23/24 1917   WBC 8.1   RBC 4.55   HEMOGLOBIN 14.9   HEMATOCRIT 44.3   MCV 97.4   MCH 32.7   MCHC 33.6  "  RDW 42.9   PLATELETCT 180   MPV 11.6     Recent Labs     04/23/24 1917   SODIUM 140   POTASSIUM 3.7   CHLORIDE 107   CO2 22   GLUCOSE 101*   BUN 22   CREATININE 1.25   CALCIUM 9.3     Recent Labs     04/23/24 1917   ALTSGPT 14   ASTSGOT 19   ALKPHOSPHAT 67   TBILIRUBIN 0.4   GLUCOSE 101*     Recent Labs     04/23/24 2001   APTT 29.8   INR 0.98     No results for input(s): \"NTPROBNP\" in the last 72 hours.      Recent Labs     04/23/24 1917   TROPONINT 7     Imaging:  CT-CTA HEAD WITH & W/O-POST PROCESS   Final Result      1.  No large vessel occlusion detected.   2.  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   3.  Mild right proximal posterior cerebral artery stenosis.      CT-CTA NECK WITH & W/O-POST PROCESSING   Final Result      Atherosclerosis with significant vessel tortuosity. There is no significant stenosis, occlusion, or aneurysm.      CT-CEREBRAL PERFUSION ANALYSIS   Final Result      1.  Cerebral blood flow less than 30% likely representing completed infarct = 0 mL.      2.  T Max more than 6 seconds likely representing combination of completed infarct and ischemia = 0 mL.      3.  Mismatched volume likely representing ischemic brain/penumbra = None      4.  Please note that the cerebral perfusion was performed on the limited brain tissue around the basal ganglia region. Infarct/ischemia outside the CT perfusion sections can be missed in this study.      CT-HEAD W/O   Final Result      No definite acute intracranial abnormality.         DX-CHEST-PORTABLE (1 VIEW)   Final Result         1.  Left basilar atelectasis, no focal infiltrate   2.  Cardiomegaly      EC-ECHOCARDIOGRAM COMPLETE W/O CONT    (Results Pending)     I personally reviewed patient EKG shows sinus rhythm with a rate of 73.  There is ST depressions in lead I, II and aVL.  Leads V3-V6.  QTc is 361.    Assessment/Plan:  Justification for Admission Status  I anticipate this patient is appropriate " for observation status at this time because likely discharge after 1 midnight.  The patient has chest pain, and hypertensive urgency.    Patient will need a telemetry bed on medical service.    * Chest pain- (present on admission)  Assessment & Plan  The 10-year ASCVD risk score (Martina AMATO, et al., 2019) is: 52.2%    Values used to calculate the score:      Age: 78 years      Sex: Female      Is Non- : No      Diabetic: No      Tobacco smoker: No      Systolic Blood Pressure: 184 mmHg      Is BP treated: Yes      HDL Cholesterol: 87 mg/dL      Total Cholesterol: 198 mg/dL  EKG shows sinus rhythm with a rate of 73.  There is ST depressions in lead I, II and aVL.  Leads V3-V6.  QTc is 361.  Initial troponin is within normal limits, I will trend  I ordered Stat EKG and troponin for recurrence of chest pain.   I will place on continuous cardiac monitoring.  Chest pain is likely secondary to her severe anxiety/panic.  Chest pain resolved after panic attack resolved.  I recommended the stress test given her abnormal EKG, however the patient is refusing.  We will check an echocardiography.     Hypertensive urgency- (present on admission)  Assessment & Plan  No known history of primary hypertension  Likely secondary to severe anxiety/panic attack  I will start anxiolytic medications  I will start as needed hydralazine for extreme hypertension  Consider starting scheduled antihypertensive medications according to blood pressure trend     Slurred speech- (present on admission)  Assessment & Plan  With her anxiety/panic attack episode.  Resolved after her anxiety/panic attack ended.  CTA/CT head did not show evidence for acute infarction or hemorrhage.  I recommended an MRI of the brain for further evaluation.  However, the patient is refusing.    Abnormal EKG- (present on admission)  Assessment & Plan  EKG shows sinus rhythm with a rate of 73.  There is ST depressions in lead I, II and aVL.  Leads  V3-V6.  QTc is 361.     I will place on continuous cardiac monitoring  I will check and trend troponins  I will check echocardiography   I recommend the stress test however the patient is refusing.    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  The patient is familiar with this diagnosis.  She follows with her primary care on this condition.  I recommended better blood pressure control.    Suicide ideation- (present on admission)  Assessment & Plan  Patient was placed on legal hold by the emergency physician.  IP for behavioral health    Stage 3a chronic kidney disease- (present on admission)  Assessment & Plan  Avoid/minimize nephrotoxins as much as possible, renally dose medications, monitor inputs and outputs     Anxiety- (present on admission)  Assessment & Plan  Presenting with severe anxiety/panic attack  I will resume home diazepam as needed.  I will start as needed IV lorazepam for panic attacks.     Hypothyroidism due to acquired atrophy of thyroid- (present on admission)  Assessment & Plan  I will start levothyroxine    Dyslipidemia, goal LDL below 160- (present on admission)  Assessment & Plan  Cardiac diet.  I will start atorvastatin      VTE prophylaxis: SCDs/TEDs and Xarelto 10 mg daily as prophylaxis

## 2024-04-24 NOTE — ED NOTES
Pt and dtr aware of pending transfer to floor-await return call from dr huff regarding family and pt request for mri. Receiving rn aware of same

## 2024-04-24 NOTE — PROGRESS NOTES
Received report, assumed pt care. Pt a&o 4, VSS, assessment completed. Resting comfortably in bed with call light, bedside table in reach. No c/o at this time. Side rails up 2. Sitter instructed to call RN when pt needs assistance, verbalized understanding. Bed alarm on, bed in low position. Will continue to monitor.     L2K pt, 1:1 sitter at the bedside

## 2024-04-24 NOTE — ASSESSMENT & PLAN NOTE
CTA showed evidence for mild fusiform aneurysmal dilation of the origin left A2 anterior cerebral artery  Findings discussed with the patient and patient daughter present at bedside in the emergency room.  The patient is familiar with this diagnosis.  She follows with her primary care on this condition.  Checking brain MRI

## 2024-04-24 NOTE — ASSESSMENT & PLAN NOTE
With her anxiety/panic attack episode.  Resolved after her anxiety/panic attack ended.  CTA/CT head did not show evidence for acute infarction or hemorrhage.  I recommended an MRI of the brain for further evaluation.  However, the patient is refusing.

## 2024-04-24 NOTE — ED NOTES
Pt ambulated to bathroom and back to room with sitter. Pt provided with snack within diet guidelines. Pt declines questions or needs at this time.

## 2024-04-24 NOTE — PROGRESS NOTES
"[facility] Medicine Daily Progress Note    Date of Service  4/24/2024    Chief Complaint  Vandana Beaver is a 78 y.o. female admitted 4/23/2024 with   Chief Complaint   Patient presents with    Anxiety     C/o sudden onset crying, difficulty breathing, feeling numb in RH and HA   Started about one hour PTA    Headache     Pt states continues to have Right side HA  Reports hx of HA    Suicidal Ideation     Pt states has thought about taking more sleeping pills than prescribed, states \"cannot handle any more pressure\"       Hospital Course  No notes on file    Interval Problem Update  I met with patient and daughter at bedside.  - Patient was initially placed on SI hold by ER physician for comments of wanting to take her pills just to have everything done with.  - I discussed with patient and daughter about this.  Patient stated she was having a lot of stress at home as she takes care of her  who has a complicated medical illness.  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  She has not been able to sleep for the last 2 days due to his medical conditions.  She was having issues with the rental home that they own in [place], as they were trying to sell it but their rental tenant had not taking care of the home and has caused him problems.  When she returned home, her  had an accident in the bathroom which she had to clean up the patient and the bathroom.  All in all, she has had increase in her stressors.  -She mentioned she just wants everything to be over with because she feels her life has now become only to take care of her  and does not feel she is living life anymore.  She stated she would never hurt herself as this would devastate her family and she could not do that to her family.  -Daughter confirmed patient's story.  She agreed her mother would never hurt herself as she is afraid of even taking medications, she has been always the strong one and does not " want to leave her family.  -At this time with daughters confirmation, I agreed patient's suicidal ideation hold has not showing any true attempt, no prior attempt, no intentions to cause harm to herself.  Patient has stated a passive type of emotions as she is undergoing a lot of stress.  I feel that someone in her position may also feel that way due to the medical burden that has been placed upon her to take care of her .  -I have discontinued the patient's legal hold.  -I have agreed patient needs brain MRI to evaluate concerns for slurred speech.    I have discussed this patient's plan of care and discharge plan at IDT rounds today with Case Management, Nursing, Nursing leadership, and other members of the IDT team.    Consultants/Specialty  None    Code Status  Full Code    Disposition  The patient is not medically cleared for discharge to home or a post-acute facility.  Anticipate discharge to: home with close outpatient follow-up    I have placed the appropriate orders for post-discharge needs.    Review of Systems  Review of Systems   Psychiatric/Behavioral:  The patient is nervous/anxious and has insomnia.         Physical Exam  Temp:  [36.6 °C (97.8 °F)-36.7 °C (98 °F)] 36.7 °C (98 °F)  Pulse:  [58-76] 60  Resp:  [15-16] 16  BP: (111-205)/() 155/61  SpO2:  [87 %-98 %] 97 %    Physical Exam  Vitals and nursing note reviewed.   Constitutional:       General: She is not in acute distress.     Appearance: She is not ill-appearing.   HENT:      Head: Normocephalic and atraumatic.   Eyes:      General: No scleral icterus.     Extraocular Movements: Extraocular movements intact.   Cardiovascular:      Rate and Rhythm: Normal rate.      Pulses: Normal pulses.      Heart sounds: Normal heart sounds. No murmur heard.  Pulmonary:      Effort: Pulmonary effort is normal. No respiratory distress.      Breath sounds: Normal breath sounds.   Abdominal:      General: Abdomen is flat. Bowel sounds are normal.  There is no distension.      Palpations: Abdomen is soft.   Musculoskeletal:         General: No swelling or tenderness. Normal range of motion.      Cervical back: Normal range of motion and neck supple.   Skin:     General: Skin is warm.      Capillary Refill: Capillary refill takes less than 2 seconds.      Coloration: Skin is not jaundiced.      Findings: No erythema.   Neurological:      General: No focal deficit present.      Mental Status: She is alert and oriented to person, place, and time. Mental status is at baseline.      Motor: No weakness.   Psychiatric:         Mood and Affect: Mood normal.         Behavior: Behavior normal.         Thought Content: Thought content normal.         Judgment: Judgment normal.         Fluids  No intake or output data in the 24 hours ending 04/24/24 1659    Laboratory  Recent Labs     04/23/24 1917 04/24/24  0951   WBC 8.1 5.7   RBC 4.55 4.31   HEMOGLOBIN 14.9 14.0   HEMATOCRIT 44.3 41.7   MCV 97.4 96.8   MCH 32.7 32.5   MCHC 33.6 33.6   RDW 42.9 43.7   PLATELETCT 180 193   MPV 11.6 11.4     Recent Labs     04/23/24 1917 04/24/24  0951   SODIUM 140 141   POTASSIUM 3.7 4.2   CHLORIDE 107 109   CO2 22 22   GLUCOSE 101* 101*   BUN 22 17   CREATININE 1.25 1.08   CALCIUM 9.3 9.1     Recent Labs     04/23/24 2001   APTT 29.8   INR 0.98               Imaging  CT-CTA HEAD WITH & W/O-POST PROCESS   Final Result      1.  No large vessel occlusion detected.   2.  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   3.  Mild right proximal posterior cerebral artery stenosis.      CT-CTA NECK WITH & W/O-POST PROCESSING   Final Result      Atherosclerosis with significant vessel tortuosity. There is no significant stenosis, occlusion, or aneurysm.      CT-CEREBRAL PERFUSION ANALYSIS   Final Result      1.  Cerebral blood flow less than 30% likely representing completed infarct = 0 mL.      2.  T Max more than 6 seconds likely representing combination  of completed infarct and ischemia = 0 mL.      3.  Mismatched volume likely representing ischemic brain/penumbra = None      4.  Please note that the cerebral perfusion was performed on the limited brain tissue around the basal ganglia region. Infarct/ischemia outside the CT perfusion sections can be missed in this study.      CT-HEAD W/O   Final Result      No definite acute intracranial abnormality.         DX-CHEST-PORTABLE (1 VIEW)   Final Result         1.  Left basilar atelectasis, no focal infiltrate   2.  Cardiomegaly      EC-ECHOCARDIOGRAM COMPLETE W/O CONT    (Results Pending)   MR-BRAIN-W/O    (Results Pending)        Assessment/Plan  * Chest pain- (present on admission)  Assessment & Plan  Troponins did not elevate  Attributed to patient's panic attack    Abnormal EKG- (present on admission)  Assessment & Plan  EKG shows sinus rhythm with a rate of 73.  There is ST depressions in lead I, II and aVL.  Leads V3-V6.  QTc is 361.     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  The patient is familiar with this diagnosis.  She follows with her primary care on this condition.  Checking brain MRI    Suicide ideation- (present on admission)  Assessment & Plan  I discontinued legal hold    Hypertensive urgency- (present on admission)  Assessment & Plan  No known history of primary hypertension  Likely secondary to severe anxiety/panic attack  I will start anxiolytic medications  I will start as needed hydralazine for extreme hypertension  Consider starting scheduled antihypertensive medications according to blood pressure trend     Slurred speech- (present on admission)  Assessment & Plan  With  her anxiety/panic attack episode.  Resolved after her anxiety/panic attack ended.  CTA/CT head did not show evidence for acute infarction or hemorrhage.  I recommended an MRI of the brain for further evaluation.  However, the patient is refusing.    Anxiety- (present on admission)  Assessment & Plan  Patient appears to have severe anxiety and had a panic attack at home.  She has had a significant amount of stressors at home.  - As needed Valium    Stage 3a chronic kidney disease- (present on admission)  Assessment & Plan  Avoid/minimize nephrotoxins as much as possible, renally dose medications, monitor inputs and outputs     Hypothyroidism due to acquired atrophy of thyroid- (present on admission)  Assessment & Plan  Continue home Synthroid    Dyslipidemia, goal LDL below 160- (present on admission)  Assessment & Plan  Cardiac diet.  Continue home atorvastatin         VTE prophylaxis:   SCDs/TEDs      I have performed a physical exam and reviewed and updated ROS and Plan today (4/24/2024). In review of yesterday's note (4/23/2024), there are no changes except as documented above.      Total time spent 51 minutes. I spent greater than 50% of the time for patient care, including unit/floor time, multiple face-to-face encounters with the patient, counseling on treatment plan and discussion with bedside RN.  Further, I spent time on my own review of patient's overnight events, RN notes, imaging and lab analysis, and developing my assessment and plan above.  In addition, working with , social workers, and Charge RN on case coordination on this date.

## 2024-04-24 NOTE — PROGRESS NOTES
4 Eyes Skin Assessment Completed by DAVID العراقي and DAVID Butler.    Head WDL  Ears WDL  Nose WDL  Mouth WDL  Neck WDL  Breast/Chest WDL  Shoulder Blades WDL  Spine WDL  (R) Arm/Elbow/Hand WDL  (L) Arm/Elbow/Hand WDL  Abdomen WDL  Groin WDL  Scrotum/Coccyx/Buttocks WDL  (R) Leg WDL  (L) Leg WDL  (R) Heel/Foot/Toe WDL  (L) Heel/Foot/Toe WDL          Devices In Places Tele Box, Blood Pressure Cuff, Pulse Ox, and Nasal Cannula      Interventions In Place Pressure Redistribution Mattress    Possible Skin Injury No    Pictures Uploaded Into Epic N/A  Wound Consult Placed N/A  RN Wound Prevention Protocol Ordered No

## 2024-04-24 NOTE — ED NOTES
Sitter remains. .  Safe Breakfast tray provided. Previously ordered am labs drawn at this time from existing iv

## 2024-04-24 NOTE — ASSESSMENT & PLAN NOTE
EKG shows sinus rhythm with a rate of 73.  There is ST depressions in lead I, II and aVL.  Leads V3-V6.  QTc is 361.     Continue cardiac monitoring at this time

## 2024-04-24 NOTE — ED PROVIDER NOTES
"ED Provider Note    CHIEF COMPLAINT  Chief Complaint   Patient presents with    Anxiety     C/o sudden onset crying, difficulty breathing, feeling numb in RH and HA   Started about one hour PTA    Headache     Pt states continues to have Right side HA  Reports hx of HA    Suicidal Ideation     Pt states has thought about taking more sleeping pills than prescribed, states \"cannot handle any more pressure\"       EXTERNAL RECORDS REVIEWED  Outpatient Notes patient contacted her primary care physician February 23, 2024 reporting that she is sad and crying a lot and very depressed.  She said the fluoxetine was not helping.  She reported several days later to her doctor that she is overwhelmed with her 's medical problems.  Her primary care physician acknowledged that she is quite anxious about her  's health.  She was last seen by her primary care physician December 2023 complaining of chronic longstanding anxiety as well as chronic insomnia.  Patient presents to the ER    HPI/ROS  LIMITATION TO HISTORY   Select: : None  OUTSIDE HISTORIAN(S):  Family daughter states that patient's speech seemed slurred if she was leaning over a table with generalized weakness, tearfulness and shortness of breath.  Speech is improved now.    Vandana Beaver is a 78 y.o. female who presents to the ER with complaint of a sudden onset of tearfulness and crying which began about an hour prior to arrival.  Patient then started feeling short of breath and generally very weak and tired.  She initially reported some chest pain during the episode of shortness of breath but then denied that it was pain and said it was more of a heaviness and a trouble breathing type of feeling.  Patient reported some tingling to the right hand which she says happens frequently.  She was diagnosed with carpal tunnel and says is not unusual for her right hand to go numb.  Patient felt generally weak and slumped over on a table as she was crying and " "having trouble breathing.  There was no loss of consciousness and no focal weakness/unilateral weakness per patient and per daughter, who witnessed the event.  Daughter reported that she felt as though the patient's speech was slurred during that time.  Patient speech is improved now.  While this was going on the patient developed a right-sided headache which she says she has been having fairly frequently since a head injury back in July 2023 where she tripped, fell, and sustained a laceration over her left eyebrow area.  Patient also reported some blurry vision during this episode.  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  She says he falls a lot and that causes her a lot of anxiety.  She is currently being treated for insomnia and anxiety.  She takes Ativan and a sleeping pill.  No history of hypertension, hyperlipidemia or diabetes.  No history of stroke or heart attack.  Upon arrival here to triage the patient said she started feeling tingling into her left hand which then moved up further into her left arm.  Daughter reported the patient was hyperventilating at that time.  Patient expresses that she often feels that she would just like to take all of her sleeping pills and never wake up.  She admits to feeling suicidal frequently because of the stress that she is under taking care of her .  However, she states \"I would not do that for my family.\"  She has never attempted suicide in the past.  As noted, she has access to sleeping pills and anxiolytics, which she takes on a regular basis.  No thoughts of harming anybody else.  No visual or auditory hallucinations.    PAST MEDICAL HISTORY   has a past medical history of Anesthesia, Carpal tunnel syndrome of right wrist, Cataract, Chronic insomnia, CKD (chronic kidney disease) stage 3, GFR 30-59 ml/min, Dyslipidemia, goal LDL below " 160 (5/2010), History of pneumonia, Hypothyroidism, Increased intraocular pressure (6/6/2017), MEDICAL HOME (10/23/12), Obesity (BMI 30.0-34.9), Pneumonia (1/8/2016), and Seasonal allergies.    SURGICAL HISTORY   has a past surgical history that includes abdominal hysterectomy total (1980s); colonoscopy (10/18/10); hilary by laparoscopy (11/11/2011); tonsillectomy (1960); carpal tunnel release (Right, 4/15/2016); cataract phaco with iol (Left, 9/1/2016); cataract phaco with iol (Right, 9/14/2016); and us-needle core bx-breast panel (Left, 02/2018).    FAMILY HISTORY  Family History   Problem Relation Age of Onset    Lung Disease Mother         66, heavy smoker, emphysema    Osteoporosis Mother     No Known Problems Father     Diabetes Maternal Grandfather     No Known Problems Daughter     No Known Problems Daughter     No Known Problems Daughter     No Known Problems Son        SOCIAL HISTORY  Social History     Tobacco Use    Smoking status: Never    Smokeless tobacco: Never   Vaping Use    Vaping Use: Never used   Substance and Sexual Activity    Alcohol use: No     Alcohol/week: 0.0 oz    Drug use: No    Sexual activity: Not Currently     Birth control/protection: Post-Menopausal       CURRENT MEDICATIONS  Home Medications       Reviewed by Mariluz DelvalleD (Pharmacist) on 04/23/24 at 2109  Med List Status: Complete     Medication Last Dose Status   atorvastatin (LIPITOR) 10 MG Tab 4/22/2024 Active   diazePAM (VALIUM) 5 MG Tab 4/22/2024 Active   estradiol (ESTRACE) 1 MG Tab 4/22/2024 Active   hydrOXYzine HCl (ATARAX) 10 MG Tab Not Taking Active   ibuprofen (MOTRIN) 200 MG Tab  Active   levothyroxine (SYNTHROID) 50 MCG Tab 4/22/2024 Active   Multiple Vitamins-Minerals (MULTIVITAMIN PO) 4/22/2024 Active   zolpidem (AMBIEN) 10 MG Tab 4/22/2024 Active                    ALLERGIES  Allergies   Allergen Reactions    Codeine Vomiting       PHYSICAL EXAM  VITAL SIGNS: BP (!) 141/63   Pulse (!) 58   Temp 36.6 °C  (97.8 °F) (Temporal)   Resp 16   Wt 72.2 kg (159 lb 2.8 oz)   SpO2 95%   BMI 26.49 kg/m²    Constitutional:  Well developed, well nourished; No acute distress.  Speech is clear and articulate.  HENT: Normocephalic, Atraumatic, Bilateral external ears normal, oropharyngeal examination deferred due to COVID-19 break and lack of oropharyngeal complaint.  Eyes: PERRL, EOMI, Conjunctiva normal, No discharge.   Neck: Normal range of motion, supple, nontender  Lymphatic: No lymphadenopathy noted.   Cardiovascular: Normal heart rate, Normal rhythm, No murmurs, rubs or gallops   Thorax & Lungs: CTA=bilaterally;  No respiratory distress,  No wheezing rales, or rhonchi; No chest tenderness. No crepitus or subQ air  Abdomen: Soft, good bowel sounds, no guarding no rebound, no masses, no pulsatile mass, no tenderness, no distention  Skin: Warm, Dry, No erythema, No rash.   Back: No tenderness, No CVA tenderness.   Extremities: 2+ dp and pt pulses bilateral LEs; 2+ radial pulses.  Nontender; no pretibial edema  Neurologic: Alert & oriented x 4, clear speech.  No drift of upper or lower extremities.   are 5 out of 5 and equal bilaterally.  Lower extremity strength are 5 out of 5 and equal bilaterally testing of dorsiflexors and plantar flexors. No ataxia with finger-to-nose.  Extraocular movements are intact.  Cranial nerves II through XII are intact.  Sensation is intact to light touch throughout.  Psychiatric: appropriate, normal affect     EKG/LABS  Results for orders placed or performed during the hospital encounter of 04/23/24   Salicylate   Result Value Ref Range    Salicylates, Quant. <1.0 (L) 15.0 - 25.0 mg/dL   ACETAMINOPHEN   Result Value Ref Range    Acetaminophen -Tylenol <5.0 (L) 10.0 - 30.0 ug/mL   CBC WITH DIFFERENTIAL   Result Value Ref Range    WBC 8.1 4.8 - 10.8 K/uL    RBC 4.55 4.20 - 5.40 M/uL    Hemoglobin 14.9 12.0 - 16.0 g/dL    Hematocrit 44.3 37.0 - 47.0 %    MCV 97.4 81.4 - 97.8 fL    MCH 32.7  27.0 - 33.0 pg    MCHC 33.6 32.2 - 35.5 g/dL    RDW 42.9 35.9 - 50.0 fL    Platelet Count 180 164 - 446 K/uL    MPV 11.6 9.0 - 12.9 fL    Neutrophils-Polys 68.30 44.00 - 72.00 %    Lymphocytes 21.50 (L) 22.00 - 41.00 %    Monocytes 8.20 0.00 - 13.40 %    Eosinophils 1.20 0.00 - 6.90 %    Basophils 0.60 0.00 - 1.80 %    Immature Granulocytes 0.20 0.00 - 0.90 %    Nucleated RBC 0.00 0.00 - 0.20 /100 WBC    Neutrophils (Absolute) 5.50 1.82 - 7.42 K/uL    Lymphs (Absolute) 1.73 1.00 - 4.80 K/uL    Monos (Absolute) 0.66 0.00 - 0.85 K/uL    Eos (Absolute) 0.10 0.00 - 0.51 K/uL    Baso (Absolute) 0.05 0.00 - 0.12 K/uL    Immature Granulocytes (abs) 0.02 0.00 - 0.11 K/uL    NRBC (Absolute) 0.00 K/uL   COMP METABOLIC PANEL   Result Value Ref Range    Sodium 140 135 - 145 mmol/L    Potassium 3.7 3.6 - 5.5 mmol/L    Chloride 107 96 - 112 mmol/L    Co2 22 20 - 33 mmol/L    Anion Gap 11.0 7.0 - 16.0    Glucose 101 (H) 65 - 99 mg/dL    Bun 22 8 - 22 mg/dL    Creatinine 1.25 0.50 - 1.40 mg/dL    Calcium 9.3 8.4 - 10.2 mg/dL    Correct Calcium 9.1 8.5 - 10.5 mg/dL    AST(SGOT) 19 12 - 45 U/L    ALT(SGPT) 14 2 - 50 U/L    Alkaline Phosphatase 67 30 - 99 U/L    Total Bilirubin 0.4 0.1 - 1.5 mg/dL    Albumin 4.2 3.2 - 4.9 g/dL    Total Protein 7.2 6.0 - 8.2 g/dL    Globulin 3.0 1.9 - 3.5 g/dL    A-G Ratio 1.4 g/dL   COD (ADULT)   Result Value Ref Range    ABO Grouping Only A     Rh Grouping Only POS     Antibody Screen-Cod NEG    TROPONIN   Result Value Ref Range    Troponin T 7 6 - 19 ng/L   URINE DRUG SCREEN   Result Value Ref Range    Amphetamines Urine Negative Negative    Barbiturates Negative Negative    Benzodiazepines Positive (A) Negative    Cocaine Metabolite Negative Negative    Fentanyl, Urine Negative Negative    Methadone Negative Negative    Opiates Negative Negative    Oxycodone Negative Negative    Phencyclidine -Pcp Negative Negative    Propoxyphene Negative Negative    Cannabinoid Metab Negative Negative   ETHYL  ALCOHOL (BLOOD)   Result Value Ref Range    Diagnostic Alcohol <10.1 <10.1 mg/dL   ESTIMATED GFR   Result Value Ref Range    GFR (CKD-EPI) 44 (A) >60 mL/min/1.73 m 2   Prothrombin Time   Result Value Ref Range    PT 13.4 12.0 - 14.6 sec    INR 0.98 0.87 - 1.13   APTT   Result Value Ref Range    APTT 29.8 24.7 - 36.0 sec   TROPONIN   Result Value Ref Range    Troponin T 7 6 - 19 ng/L   EKG (NOW)   Result Value Ref Range    Report       [facility] Emergency Dept.    Test Date:  2024  Pt Name:    EDITH BECKER               Department: [facility]  MRN:        [id number]                      Room:       -ROOM 1  Gender:     Female                       Technician: [id number]  :        [id number]                   Requested By:KADEN AGUIRRE  Order #:    [id number]                    Reading MD: Kaden Aguirre    Measurements  Intervals                                Axis  Rate:       73                           P:          -90  LA:         181                          QRS:        -40  QRSD:       76                           T:          180  QT:         327  QTc:        361    Interpretive Statements  Sinus or ectopic atrial rhythm rate 73  Normal axis  Normal intervals  Minimal ST depression V3-V6 (unchanged)  No ST elevation  Increased baseline artifact  Atrial premature complex  Inferior infarct, old  Lateral leads are also involved  Compared to ECG 2022 14:54:17    Electronically Signed On  20:27:52 PDT by Kaden Aguirre     POCT glucose device results   Result Value Ref Range    POC Glucose, Blood 92 65 - 99 mg/dL        I have independently interpreted this EKG:  Please see my EKG interpretation below    RADIOLOGY/PROCEDURES   I have independently interpreted the diagnostic imaging associated with this visit and am waiting the final reading from the radiologist.     My preliminary interpretation is as follows: ER MD is reviewed the patient's chest x-ray.  No obvious  infiltrates.    Radiologist interpretation:  CT-CTA HEAD WITH & W/O-POST PROCESS   Final Result      1.  No large vessel occlusion detected.   2.  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   3.  Mild right proximal posterior cerebral artery stenosis.      CT-CTA NECK WITH & W/O-POST PROCESSING   Final Result      Atherosclerosis with significant vessel tortuosity. There is no significant stenosis, occlusion, or aneurysm.      CT-CEREBRAL PERFUSION ANALYSIS   Final Result      1.  Cerebral blood flow less than 30% likely representing completed infarct = 0 mL.      2.  T Max more than 6 seconds likely representing combination of completed infarct and ischemia = 0 mL.      3.  Mismatched volume likely representing ischemic brain/penumbra = None      4.  Please note that the cerebral perfusion was performed on the limited brain tissue around the basal ganglia region. Infarct/ischemia outside the CT perfusion sections can be missed in this study.      CT-HEAD W/O   Final Result      No definite acute intracranial abnormality.         DX-CHEST-PORTABLE (1 VIEW)   Final Result         1.  Left basilar atelectasis, no focal infiltrate   2.  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  Patient says she has had that for many years and it is being followed and her primary care physician knows about it.    COURSE & MEDICAL DECISION MAKING    ASSESSMENT, COURSE AND PLAN  Care Narrative: Patient presents to the ER with a sudden onset of crying with trouble breathing.  She described tingling in the right hand which is often common for her given her carpal tunnel.  Patient believes she had a panic attack.  At 1 point she was slumped over the table with generalized weakness and fatigue.  Daughter states that patient's speech seems slurred at that time.  Speech  here in the ER is normal.  Patient describes some chest discomfort with her shortness of breath.  Upon arrival to the ER when she was checking in in triage she described a tingling in her left hand which moved up her arm.  Daughter reported she was hyperventilating at the time.  Patient has been under a lot of stress due to having to care for a sick and elderly .  She has history of depression, anxiety and insomnia.  She takes Ambien and benzodiazepines regularly.  Given patient's slurred speech as she was hunched over a table with generalized weakness, crying, and trouble breathing, daughter was concerned the patient might be having a stroke.  With respect to patient's paresthesias, it was bilateral hands and she was notably hyperventilating at the time per daughter.  Patient has no focal deficits on examination.  Her NIH score is 0.  She never had any unilateral weakness of arms or legs.  She did not have any tingling in her legs.  No facial drooping or slurred speech.  She had a right-sided headache upon arrival which is common for her as she has been getting frequent right-sided headaches since a head injury back in July 2023.  Nothing new or different in terms of this headache.  No new trauma or injury.  Low suspicion for CVA.  Patient symptoms seem more consistent with panic attack/anxiety.  However, she is 78 years old so she underwent a stroke workup with CT CTA as well as a cardiac workup for complaint of shortness of breath and chest discomfort with some tingling in her arms.  EKG is nonacute.  No ST elevation.  She did have some minor ST depression in the lateral precordial leads which is unchanged from previous.  Initial troponin is negative.  At this time no concern for STEMI or non-STEMI.  CT/CTA was negative for any acute head bleed.  No dissection.  No LVO.  Tingling and speech issues have resolved here in the ER.  Patient reported that she has been having thoughts of taking all of her  "sleeping pills and not waking up lately.  She says she is overwhelmed with having to caregiver for her elderly and ill .  Patient was placed on a legal 2000 by myself.  She will need to be admitted to be medically cleared.  She will need cardiac and TIA versus CVA workup as an inpatient.  She remains on a legal 2000 given her suicidal ideation with plan.  I spoke with the hospitalist and he is aware she is on a hold.  She will need psychiatric evaluation when she is medically cleared by the hospitalist service.  Patient is amenable to hospitalization.    Stroke: Time seen immediately upon patient arrival (Time)   No, this patient is not a candidate for thrombolytic therapy because slurred speech is improved, patient had no focal deficits.  Tingling was in bilateral hands.  Symptoms are more consistent with anxiety/panic attack than CVA.  NIH is 0.  Low suspicion for stroke.  No need for tPA.      1920: I spoke with the patient's daughter who is very upset that patient will be on a legal 2000.  She said she brought her mother here because she was worried her mother was having a stroke.  She thinks is \"ridiculous\" that we are putting her on suicide watch.\"  I tried to explain to the daughter that patient expressed concerns about taking excess sleeping medications and not waking up as a result of the stress she is under taking care of her .  I explained to the patient's daughter that some of the patient's symptoms here today may very well be related to her underlying anxiety which is likely contributing to her thoughts of harming self.    Discussed with Dr. Vallejo, hospitalist on-call.  He will kindly evaluate the patient hospitalization.    ADDITIONAL PROBLEMS MANAGED  Problem #1: Tearfulness and trouble breathing which began prior to arrival   problem #2: Mild chest discomfort with trouble breathing  Problem #3: Tingling to bilateral hands when patient was hyperventilating here in triage.  Problem #4: " Worsening anxiety and depression with suicidal ideation    DISPOSITION AND DISCUSSIONS  I have discussed management of the patient with the following physicians and BARBARA's: Hospitalist    Discussion of management with other Q or appropriate source(s): None     Escalation of care considered, and ultimately not performed:diagnostic imaging.  Patient will need MRI scan to definitively evaluate for TIA versus CVA.  Will be admitted to the hospital service for further stroke and cardiac workups.  MRI scan can be done upstairs.    Barriers to care at this time, including but not limited to:  None known .     Decision tools and prescription drugs considered including, but not limited to:  Patient was given aspirin once CT brain came back negative for any head bleed. .    FINAL DIAGNOSIS  1. Suicidal ideation Acute   2. Anxiety Acute   3. Acute nonintractable headache, unspecified headache type Acute   4. Paresthesias Acute        This dictation has been created using voice recognition software. The accuracy of the dictation is limited by the abilities of the software. I expect there may be some errors of grammar and possibly content. I made every attempt to manually correct the errors within my dictation. However, errors related to voice recognition software may still exist and should be interpreted within the appropriate context.     Electronically signed by: Vera Aguirre M.D., 4/23/2024 7:30 PM

## 2024-04-24 NOTE — CARE PLAN
The patient is Stable - Low risk of patient condition declining or worsening    Shift Goals  Clinical Goals: psych consult, MRI, ECHO  Patient Goals: Rest  Family Goals: Stay UTD on POC    Progress made toward(s) clinical / shift goals:  rest    Problem: Pain - Standard  Goal: Alleviation of pain or a reduction in pain to the patient’s comfort goal  Outcome: Progressing  Note: Pt declines pain at this time, RN will continue to assess.      Problem: Knowledge Deficit - Standard  Goal: Patient and family/care givers will demonstrate understanding of plan of care, disease process/condition, diagnostic tests and medications  Outcome: Progressing  Note: Pt aware of POC: ECHO, MRI, psych consult. Goal is for pt to be updated in the moment.          Patient is not progressing towards the following goals:

## 2024-04-24 NOTE — ED NOTES
Assumed care of pt . Sitter in doorway, pt observed resting on side with eyes closed. Remains on moniter=sr with occ pac. Per sitter, pulse ox with occ decrease to 86%. Pt placed on n/c at 2L with improvement in same

## 2024-04-24 NOTE — ED NOTES
Medication history updated per patient. Patient denies recent use of antibiotics and denies any use of anticoagulants.     Yaya Tran, MariluzD, BCPS

## 2024-04-24 NOTE — ED NOTES
Pt in view of sitter with daughter still at bedside with approval of Physician. Pt provided with water and medicated per MAR. No questions at this time.

## 2024-04-24 NOTE — ED NOTES
Update to dr huff-no mri noted or alert team consult. Await mri info, orders for alert team amd notified at this time

## 2024-04-24 NOTE — ED NOTES
Pt was roomed from triage immediately due to pt having SI thoughts and concerns of CP and stroke like symptoms. 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 Safety precautions in place and Dr Aguirre has decided to make an exception to allow that Daughter to stay as emotions became escalated while attempting to have her leave. Pt and daughter aware of care plan at this time. Pt is in view of sitter at this time.

## 2024-04-25 ENCOUNTER — APPOINTMENT (OUTPATIENT)
Dept: CARDIOLOGY | Facility: MEDICAL CENTER | Age: 79
End: 2024-04-25
Attending: HOSPITALIST
Payer: MEDICARE

## 2024-04-25 ENCOUNTER — PHARMACY VISIT (OUTPATIENT)
Dept: PHARMACY | Facility: MEDICAL CENTER | Age: 79
End: 2024-04-25
Payer: COMMERCIAL

## 2024-04-25 ENCOUNTER — PATIENT MESSAGE (OUTPATIENT)
Dept: MEDICAL GROUP | Facility: MEDICAL CENTER | Age: 79
End: 2024-04-25

## 2024-04-25 VITALS
SYSTOLIC BLOOD PRESSURE: 146 MMHG | RESPIRATION RATE: 18 BRPM | HEART RATE: 66 BPM | TEMPERATURE: 98 F | BODY MASS INDEX: 27.3 KG/M2 | HEIGHT: 63 IN | OXYGEN SATURATION: 100 % | DIASTOLIC BLOOD PRESSURE: 74 MMHG | WEIGHT: 154.1 LBS

## 2024-04-25 LAB
LV EJECT FRACT  99904: 70
LV EJECT FRACT MOD 2C 99903: 72.85
LV EJECT FRACT MOD 4C 99902: 66.1
LV EJECT FRACT MOD BP 99901: 69.87

## 2024-04-25 PROCEDURE — RXMED WILLOW AMBULATORY MEDICATION CHARGE: Performed by: INTERNAL MEDICINE

## 2024-04-25 PROCEDURE — 99239 HOSP IP/OBS DSCHRG MGMT >30: CPT | Performed by: INTERNAL MEDICINE

## 2024-04-25 PROCEDURE — A9270 NON-COVERED ITEM OR SERVICE: HCPCS | Performed by: HOSPITALIST

## 2024-04-25 PROCEDURE — 93306 TTE W/DOPPLER COMPLETE: CPT

## 2024-04-25 PROCEDURE — 94760 N-INVAS EAR/PLS OXIMETRY 1: CPT

## 2024-04-25 PROCEDURE — 700102 HCHG RX REV CODE 250 W/ 637 OVERRIDE(OP): Performed by: INTERNAL MEDICINE

## 2024-04-25 PROCEDURE — A9270 NON-COVERED ITEM OR SERVICE: HCPCS | Performed by: INTERNAL MEDICINE

## 2024-04-25 PROCEDURE — 700102 HCHG RX REV CODE 250 W/ 637 OVERRIDE(OP): Performed by: HOSPITALIST

## 2024-04-25 PROCEDURE — G0378 HOSPITAL OBSERVATION PER HR: HCPCS

## 2024-04-25 PROCEDURE — 93306 TTE W/DOPPLER COMPLETE: CPT | Mod: 26 | Performed by: INTERNAL MEDICINE

## 2024-04-25 RX ORDER — AMLODIPINE BESYLATE 5 MG/1
5 TABLET ORAL DAILY
Qty: 30 TABLET | Refills: 3 | Status: SHIPPED | OUTPATIENT
Start: 2024-04-25 | End: 2024-05-25

## 2024-04-25 RX ORDER — AMLODIPINE BESYLATE 5 MG/1
5 TABLET ORAL
Status: DISCONTINUED | OUTPATIENT
Start: 2024-04-25 | End: 2024-04-25 | Stop reason: HOSPADM

## 2024-04-25 RX ADMIN — AMLODIPINE BESYLATE 5 MG: 5 TABLET ORAL at 07:55

## 2024-04-25 RX ADMIN — ASPIRIN 81 MG: 81 TABLET, COATED ORAL at 04:55

## 2024-04-25 ASSESSMENT — FIBROSIS 4 INDEX: FIB4 SCORE: 1.87

## 2024-04-25 NOTE — PROGRESS NOTES
Telemetry Shift Summary    Rhythm SR/SB  HR Range 55-64  Ectopy -  Measurements 0.19/0.07/0.56        Normal Values  Rhythm SR  HR Range    Measurements 0.12-0.20 / 0.06-0.10  / 0.30-0.52

## 2024-04-25 NOTE — PROGRESS NOTES
Patient requested to have he home Ambien 10 mg restarted while she is here in the hospital. Dr. Vallejo notified of the above and order added.

## 2024-04-25 NOTE — DISCHARGE SUMMARY
"Discharge Summary    CHIEF COMPLAINT ON ADMISSION  Chief Complaint   Patient presents with    Anxiety     C/o sudden onset crying, difficulty breathing, feeling numb in RH and HA   Started about one hour PTA    Headache     Pt states continues to have Right side HA  Reports hx of HA    Suicidal Ideation     Pt states has thought about taking more sleeping pills than prescribed, states \"cannot handle any more pressure\"       Reason for Admission  Numbness; Slurred Speech; Head Pain     Admission Date  4/23/2024    CODE STATUS  Full Code    HPI & HOSPITAL COURSE  This is \"Vandana Beaver is a 78 y.o. female with a past medical history of hypothyroidism, chronic kidney disease and hyperlipidemia with severe anxiety who presented 4/23/2024 with severe anxiety, headache, chest pain, palpitations and tingling in both of her upper extremities.  Chest pain and tingling of her limbs resolved after her anxiety/panic attacks improved.  Patient denies losing consciousness or having abnormal jerky movements of her limbs.  Patient reports that her anxiety has been increasing because she has been taking care of her  who has multiple worsening problems.\" (As per admitting physician Dr. Vallejo in H&P).    I met with patient and daughter at bedside.  - Patient was initially placed on SI hold by ER physician for comments of wanting to take her pills just to have everything done with.  - I discussed with patient and daughter about this.  Patient stated she was having a lot of stress at home as she takes care of her  who has a complicated medical illness.  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  She has not been able to sleep for the last 2 days due to his medical conditions.  She was having issues with the rental home that they own in [place], as they were trying to sell it but their rental tenant had not taking care of the home and has caused him problems.  When she returned home, " her  had an accident in the bathroom which she had to clean up the patient and the bathroom.  All in all, she has had increase in her stressors.  -She mentioned she just wants everything to be over with because she feels her life has now become only to take care of her  and does not feel she is living life anymore.  She stated she would never hurt herself as this would devastate her family and she could not do that to her family.  -Daughter confirmed patient's story.  She agreed her mother would never hurt herself as she is afraid of even taking medications, she has been always the strong one and does not want to leave her family.  -At this time with daughters confirmation, I agreed patient's suicidal ideation hold has not showing any true attempt, no prior attempt, no intentions to cause harm to herself.  Patient has stated a passive type of emotions as she is undergoing a lot of stress.  I feel that someone in her position may also feel that way due to the medical burden that has been placed upon her to take care of her .  -I have discontinued the patient's legal hold 4/24/24.    -I have agreed patient needs brain MRI to evaluate concerns for slurred speech.  I reviewed MRI results, there are no acute infarcts, no previous trauma appearance on T2 or DWI.    For patient's hypertension, I have started amlodipine 5 mg.  She did present with a blood pressure of 205/77, upon arrival to the ER.  Patient stated she has not been on any type of blood pressure medications.  I have recommended to start a blood pressure diary, 3 times a day and bring into her PCP for adjustment.  At this time I am starting low-dose amlodipine 5 mg as it is unclear on her reaction for the medication.  This will need to be titrated by her PCP.    Therefore, she is discharged in good and stable condition to home with close outpatient follow-up.    The patient recovered much more quickly than anticipated on  "admission.    Discharge Date  04/25/24    FOLLOW UP ITEMS POST DISCHARGE  With PCP  Bertrand     DISCHARGE DIAGNOSES  Principal Problem:    Chest pain (POA: Yes)  Active Problems:    Dyslipidemia, goal LDL below 160 (POA: Yes)    Hypothyroidism due to acquired atrophy of thyroid (POA: Yes)    Stage 3a chronic kidney disease (POA: Yes)      Overview: [facility]. 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 *      FOLLOW UP  Future Appointments   Date Time Provider Department Center   5/24/2024 10:40 AM Esteban Camp M.D. [facility] Camp M.D.  [address]  [facility] [address]  [facility] [id number] [phone]    Schedule an appointment as soon as possible for a visit in 1 week(s)  Please follow-up with your primary care provider to discuss your current adjustment disorder.  It would be beneficial to continue any type of cognitive behavioral therapy with a psychologist.  -Please discuss about antihypertensive medication.  We have started amlodipine 5 mg.  Please titrate or switch medication if needed.  Please bring in your blood pressure logs/diary.    Behavioral Health Outpatient  [address] [place] [address] [address]  [facility] [id number] [phone]  Call  Please call to see if your referral has been approved to [facility].      MEDICATIONS ON DISCHARGE     Medication List        START taking these medications        Instructions   amLODIPine 5 MG Tabs  Commonly known as: Norvasc   Take 1 Tablet by mouth every day for 30 " days.  Dose: 5 mg            CONTINUE taking these medications        Instructions   atorvastatin 10 MG Tabs  Commonly known as: Lipitor   TAKE 1 TABLET BY MOUTH ONCE DAILY IN THE EVENING     diazePAM 5 MG Tabs  Commonly known as: Valium   Take 2.5 mg by mouth every 8 hours as needed for Anxiety.  Dose: 2.5 mg     estradiol 1 MG Tabs  Commonly known as: Estrace   Take 1 Tablet by mouth every day.  Dose: 1 mg     hydrOXYzine HCl 10 MG Tabs  Commonly known as: Atarax   Take 1 Tablet by mouth 3 times a day as needed for Anxiety.  Dose: 10 mg     levothyroxine 50 MCG Tabs  Commonly known as: Synthroid   Take 1 Tablet by mouth every morning on an empty stomach.  Dose: 50 mcg     MULTIVITAMIN PO   Take  by mouth every day.     zolpidem 10 MG Tabs  Commonly known as: Ambien   Doctor's comments: 30 tablets is a 30 day quantity.  ICD-10 code F51.04  Take 1 Tablet by mouth at bedtime for 180 days. 30 tablets is a 30 day quantity  Dose: 10 mg            STOP taking these medications      ibuprofen 200 MG Tabs  Commonly known as: Motrin              Allergies  Allergies   Allergen Reactions    Codeine Vomiting       DIET  Orders Placed This Encounter   Procedures    Diet Order Diet: Cardiac     Standing Status:   Standing     Number of Occurrences:   1     Order Specific Question:   Diet:     Answer:   Cardiac [6]       ACTIVITY  As tolerated.  Weight bearing as tolerated    CONSULTATIONS  none    PROCEDURES  none    LABORATORY  Lab Results   Component Value Date    SODIUM 141 04/24/2024    POTASSIUM 4.2 04/24/2024    CHLORIDE 109 04/24/2024    CO2 22 04/24/2024    GLUCOSE 101 (H) 04/24/2024    BUN 17 04/24/2024    CREATININE 1.08 04/24/2024    CREATININE 1.00 05/13/2010    GLOMRATE 56 (L) 05/13/2010        Lab Results   Component Value Date    WBC 5.7 04/24/2024    WBC 7.1 05/13/2010    HEMOGLOBIN 14.0 04/24/2024    HEMATOCRIT 41.7 04/24/2024    PLATELETCT 193 04/24/2024      MR-BRAIN-W/O   Final Result      1.  No acute  abnormality detected. No infarct detected.      CT-CTA HEAD WITH & W/O-POST PROCESS   Final Result      1.  No large vessel occlusion detected.   2.  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   3.  Mild right proximal posterior cerebral artery stenosis.      CT-CTA NECK WITH & W/O-POST PROCESSING   Final Result      Atherosclerosis with significant vessel tortuosity. There is no significant stenosis, occlusion, or aneurysm.      CT-CEREBRAL PERFUSION ANALYSIS   Final Result      1.  Cerebral blood flow less than 30% likely representing completed infarct = 0 mL.      2.  T Max more than 6 seconds likely representing combination of completed infarct and ischemia = 0 mL.      3.  Mismatched volume likely representing ischemic brain/penumbra = None      4.  Please note that the cerebral perfusion was performed on the limited brain tissue around the basal ganglia region. Infarct/ischemia outside the CT perfusion sections can be missed in this study.      CT-HEAD W/O   Final Result      No definite acute intracranial abnormality.         DX-CHEST-PORTABLE (1 VIEW)   Final Result         1.  Left basilar atelectasis, no focal infiltrate   2.  Cardiomegaly      EC-ECHOCARDIOGRAM COMPLETE W/O CONT    (Results Pending)       Total time of the discharge process exceeds 32 minutes.

## 2024-04-25 NOTE — CARE PLAN
The patient is Stable - Low risk of patient condition declining or worsening    Shift Goals  Clinical Goals: Echo in AM  Patient Goals: Rest; Sleep  Family Goals:     Progress made toward(s) clinical / shift goals:  Echo in AM. Work with interdisciplinary team for discharge needs. Cluster care to allow patient to sleep. Hospitalist notified for orders to restart Ambien that patient takes at home.     Problem: Pain - Standard  Goal: Alleviation of pain or a reduction in pain to the patient’s comfort goal  Outcome: Progressing     Problem: Knowledge Deficit - Standard  Goal: Patient and family/care givers will demonstrate understanding of plan of care, disease process/condition, diagnostic tests and medications  Outcome: Progressing       Patient is not progressing towards the following goals:N/A

## 2024-04-25 NOTE — DISCHARGE INSTRUCTIONS
Instructions for home monitoring of blood pressure:    ** Please obtain an ARM blood pressure machine, if you do not have one. Please obtain machine that can also track heart rate, if possible.  Please DO NOT use wrist type machine.    1) Please continue to take your medications as directed  2)  If you have symptoms of dizziness or nausea or falling or syncope or blurry vision, please check your blood pressure. IF your blood pressure remains low (Systolic or top number less than 100mmHg) please hold your medication and speak with your primary care provider as soon as possible.  Please seek help at an emergency room if your blood pressure does not improve at home.  3)  Please adhere to a CEE diet (limited sodium diet, 2 grams per day, no added salt to food) or diet recommended for cardiac, renal, low fat or diabetic diet you were on before.    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  2)  Please take blood pressure reading when you wake up, around lunch time and before sleeping.  3)  Please bring blood pressure diary to your primary care provider or cardiologist for blood pressure medication management.

## 2024-04-26 ENCOUNTER — PATIENT OUTREACH (OUTPATIENT)
Dept: MEDICAL GROUP | Facility: MEDICAL CENTER | Age: 79
End: 2024-04-26
Payer: MEDICARE

## 2024-04-30 NOTE — PROGRESS NOTES
Transitional Care Management    Two attempts were made to contact this patient within two business days to review discharge per CMS guidelines, but were unsuccessful.  This patient, however still qualifies for a Transitional Care Management visit as they are being seen within the required time .  You therefore can code and charge appropriately for the TCM.    Patient did leave me a voicemail stating she is doing well and plans on seeing  at her scheduled appt this week.

## 2024-05-02 ENCOUNTER — OFFICE VISIT (OUTPATIENT)
Dept: MEDICAL GROUP | Facility: MEDICAL CENTER | Age: 79
End: 2024-05-02
Payer: MEDICARE

## 2024-05-02 VITALS
HEIGHT: 63 IN | SYSTOLIC BLOOD PRESSURE: 138 MMHG | BODY MASS INDEX: 27.29 KG/M2 | RESPIRATION RATE: 16 BRPM | TEMPERATURE: 98 F | WEIGHT: 154 LBS | DIASTOLIC BLOOD PRESSURE: 86 MMHG | OXYGEN SATURATION: 95 % | HEART RATE: 79 BPM

## 2024-05-02 DIAGNOSIS — F51.04 CHRONIC INSOMNIA: ICD-10-CM

## 2024-05-02 DIAGNOSIS — F13.20 SEDATIVE HYPNOTIC OR ANXIOLYTIC DEPENDENCE (HCC): ICD-10-CM

## 2024-05-02 DIAGNOSIS — N95.1 MENOPAUSAL SYMPTOMS: ICD-10-CM

## 2024-05-02 DIAGNOSIS — I10 ESSENTIAL HYPERTENSION: ICD-10-CM

## 2024-05-02 DIAGNOSIS — N18.31 STAGE 3A CHRONIC KIDNEY DISEASE: ICD-10-CM

## 2024-05-02 DIAGNOSIS — Z63.6 CAREGIVER STRESS: ICD-10-CM

## 2024-05-02 DIAGNOSIS — I72.9 ANEURYSM (HCC): ICD-10-CM

## 2024-05-02 DIAGNOSIS — F41.9 CHRONIC ANXIETY: ICD-10-CM

## 2024-05-02 DIAGNOSIS — F43.21 SITUATIONAL DEPRESSION: ICD-10-CM

## 2024-05-02 DIAGNOSIS — R94.31 ABNORMAL EKG: ICD-10-CM

## 2024-05-02 DIAGNOSIS — I72.8 SPLENIC ARTERY ANEURYSM (HCC): ICD-10-CM

## 2024-05-02 DIAGNOSIS — E03.4 HYPOTHYROIDISM DUE TO ACQUIRED ATROPHY OF THYROID: ICD-10-CM

## 2024-05-02 PROBLEM — R07.9 CHEST PAIN: Status: RESOLVED | Noted: 2024-04-23 | Resolved: 2024-05-02

## 2024-05-02 PROBLEM — I16.0 HYPERTENSIVE URGENCY: Status: RESOLVED | Noted: 2024-04-23 | Resolved: 2024-05-02

## 2024-05-02 PROBLEM — R45.851 SUICIDE IDEATION: Status: RESOLVED | Noted: 2024-04-23 | Resolved: 2024-05-02

## 2024-05-02 PROBLEM — R47.81 SLURRED SPEECH: Status: RESOLVED | Noted: 2024-04-23 | Resolved: 2024-05-02

## 2024-05-02 PROCEDURE — 3079F DIAST BP 80-89 MM HG: CPT | Performed by: FAMILY MEDICINE

## 2024-05-02 PROCEDURE — 3075F SYST BP GE 130 - 139MM HG: CPT | Performed by: FAMILY MEDICINE

## 2024-05-02 PROCEDURE — 99214 OFFICE O/P EST MOD 30 MIN: CPT | Performed by: FAMILY MEDICINE

## 2024-05-02 RX ORDER — AMLODIPINE BESYLATE 5 MG/1
5 TABLET ORAL DAILY
Qty: 90 TABLET | Refills: 3 | Status: SHIPPED | OUTPATIENT
Start: 2024-05-02

## 2024-05-02 RX ORDER — ESTRADIOL 1 MG/1
1 TABLET ORAL DAILY
Qty: 90 TABLET | Refills: 3 | Status: SHIPPED | OUTPATIENT
Start: 2024-05-02

## 2024-05-02 RX ORDER — ZOLPIDEM TARTRATE 10 MG/1
10 TABLET ORAL
Qty: 30 TABLET | Refills: 5 | Status: SHIPPED | OUTPATIENT
Start: 2024-05-02 | End: 2024-10-29

## 2024-05-02 RX ORDER — DIAZEPAM 5 MG/1
5 TABLET ORAL EVERY 12 HOURS PRN
Qty: 45 TABLET | Refills: 2 | Status: SHIPPED | OUTPATIENT
Start: 2024-05-02 | End: 2024-07-31

## 2024-05-02 SDOH — SOCIAL STABILITY - SOCIAL INSECURITY: DEPENDENT RELATIVE NEEDING CARE AT HOME: Z63.6

## 2024-05-02 ASSESSMENT — FIBROSIS 4 INDEX: FIB4 SCORE: 1.87

## 2024-05-02 NOTE — PROGRESS NOTES
Chief Complaint   Patient presents with    Hospital Follow-up    Anxiety    Depression    Hypertension     [facility] BP        Subjective:     HPI:   Vandana Beaver presents today with the followin. Essential hypertension/Stage 3a chronic kidney disease  HTN - Chronic condition stable. Currently taking all meds as directed.   She is not taking baby aspirin daily.   She  is occasionally  monitoring BP at home.  138/86 here today diastolic is a little too high.  She will continue to check on a daily basis  Denies symptoms low BP: light-headed, tunnel-vision, unusual fatigue.   Denies symptoms high BP:pounding headache, visual changes, palpitations, flushed face.   Denies medicine side effects: unusual fatigue, slow heartbeat, foot/leg swelling, cough.    2. Abnormal EKG  Patient did have an unusual ECG on .  Patient has no episode of MI.  Patient is a never smoker.    3. Situational depression/caregiver stress  Patient hospitalized  for ultimately severe anxiety and feeling overwhelmed.  She did have some chest pain so a cardiac rule out was performed which was negative.  The fluoxetine and hydroxizine did not help.  Took those medications for six weeks.  Her daughter is moving to be closer.  This has been helpful.  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  The diazepam helped significantly for anxiety and insomnia.  PDMP review shows no inconsistencies in the medication is renewed.    4. Hypothyroidism due to acquired atrophy of thyroid  Patient reports good energy level on the medication. Patient denies insomnia, tremor or change in appetite.  Patient is taking the medication on an empty stomach in the morning and waiting at least 30 minutes before eating.  Last TSH in October was at [facility].    5. Chronic insomnia/Chronic anxiety  Patient has longstanding chronic insomnia treated with zolpidem.  She states the zolpidem gives her 6 to 7 hours of  restful sleep without hangover.  She is also using the diazepam for chronic anxiety and also chronic anxiety associated insomnia when needed.  No adverse events have been reported or observed.  PDMP review shows no inconsistencies.    6. Sedative hypnotic or anxiolytic dependence (HCC)  Patient has been taking the zolpidem for many years and has been taking the diazepam now for several months with no adverse events reported or observed.    7. 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  Has been stable.    8. Menopausal symptoms  Estrogen renewal placed.  This gives her good symptom relief.  No history of blood clots.        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) 04/23/2024    Abnormal EKG 04/23/2024    Sedative hypnotic or anxiolytic dependence (HCC) 07/20/2023    Menopausal symptoms 11/01/2021    Hearing aid worn 11/01/2021    Splenic artery aneurysm (HCC) 07/14/2021    Chronic anxiety 07/14/2021    Simple hepatic cyst 06/06/2017    History of cataract removal with insertion of prosthetic lens 09/14/2016    Stage 3a chronic kidney disease 05/21/2015    History of pneumonia 02/19/2014    Hypothyroidism due to acquired atrophy of thyroid     History of carpal tunnel surgery of right wrist     MEDICAL HOME 10/23/2012    Seasonal allergies     Chronic insomnia     Dyslipidemia, goal LDL below 160 05/01/2010       Current medicines (including changes today)  Current Outpatient Medications   Medication Sig Dispense Refill    zolpidem (AMBIEN) 10 MG Tab Take 1 Tablet by mouth at bedtime for 180 days. 30 tablets is a 30 day quantity 30 Tablet 5    amLODIPine (NORVASC) 5 MG Tab Take 1 Tablet by mouth every day. 90 Tablet 3    estradiol (ESTRACE) 1 MG Tab Take 1  "Tablet by mouth every day. 90 Tablet 3    diazePAM (VALIUM) 5 MG Tab Take 1 Tablet by mouth every 12 hours as needed for Anxiety for up to 90 days. 45 tablets is a 30 day quantity 45 Tablet 2    diazePAM (VALIUM) 5 MG Tab Take 2.5 mg by mouth every 8 hours as needed for Anxiety.      atorvastatin (LIPITOR) 10 MG Tab TAKE 1 TABLET BY MOUTH ONCE DAILY IN THE EVENING 100 Tablet 2    levothyroxine (SYNTHROID) 50 MCG Tab Take 1 Tablet by mouth every morning on an empty stomach. 90 Tablet 3    Multiple Vitamins-Minerals (MULTIVITAMIN PO) Take  by mouth every day.       No current facility-administered medications for this visit.       Allergies   Allergen Reactions    Codeine Vomiting       ROS: As per HPI  denies current chest pain or shortness of breath.  Was able to get out Sunday to be with friends, first time in a while.  Family is helping.       Objective:     /86   Pulse 79   Temp 36.7 °C (98 °F)   Resp 16   Ht 1.6 m (5' 3\")   Wt 69.9 kg (154 lb)   SpO2 95%  Body mass index is 27.28 kg/m².    Physical Exam:  Constitutional: Well-developed and well-nourished. Not diaphoretic. No distress. Lucid and fluent.  Skin: Skin is warm and dry. No rash noted.  Head: Atraumatic without lesions.  Eyes: Conjunctivae and extraocular motions are normal. Pupils are equal, round, and reactive to light. No scleral icterus.   Ears:  External ears unremarkable.   Neck: Supple, trachea midline. No thyromegaly present. No cervical or supraclavicular lymphadenopathy. No JVD or carotid bruits appreciated  Cardiovascular: Regular rate and rhythm.  Normal S1, S2 without murmur appreciated.  Chest: Effort normal. Clear to auscultation throughout. No adventitious sounds.   Abdomen: Soft, non tender, and without distention. Active bowel sounds in all four quadrants. No rebound, guarding, masses or hepatosplenomegaly.  Extremities: No cyanosis, clubbing, erythema, nor edema.   Neurological: Alert and oriented x 3. No tremor " appreciated.  Psychiatric:  Behavior, mood, and affect are appropriate.       Assessment and Plan:     78 y.o. female with the following issues:    1. Essential hypertension  amLODIPine (NORVASC) 5 MG Tab      2. Stage 3a chronic kidney disease        3. Abnormal EKG        4. Situational depression        5. Hypothyroidism due to acquired atrophy of thyroid        6. Chronic insomnia  zolpidem (AMBIEN) 10 MG Tab      7. Chronic anxiety  diazePAM (VALIUM) 5 MG Tab      8. Sedative hypnotic or anxiolytic dependence (HCC)        9. 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 Splenic artery aneurysm (HCC)        11. Menopausal symptoms  estradiol (ESTRACE) 1 MG Tab            Followup: Return in about 2 months (around 7/2/2024), or if symptoms worsen or fail to improve.

## 2024-05-24 ENCOUNTER — APPOINTMENT (OUTPATIENT)
Dept: MEDICAL GROUP | Facility: MEDICAL CENTER | Age: 79
End: 2024-05-24
Payer: MEDICARE

## 2024-05-24 VITALS
WEIGHT: 154 LBS | BODY MASS INDEX: 27.29 KG/M2 | SYSTOLIC BLOOD PRESSURE: 142 MMHG | TEMPERATURE: 98 F | DIASTOLIC BLOOD PRESSURE: 80 MMHG | RESPIRATION RATE: 18 BRPM | HEART RATE: 77 BPM | OXYGEN SATURATION: 97 % | HEIGHT: 63 IN

## 2024-05-24 DIAGNOSIS — E03.4 HYPOTHYROIDISM DUE TO ACQUIRED ATROPHY OF THYROID: ICD-10-CM

## 2024-05-24 DIAGNOSIS — N18.31 STAGE 3A CHRONIC KIDNEY DISEASE: ICD-10-CM

## 2024-05-24 DIAGNOSIS — I10 ESSENTIAL HYPERTENSION: ICD-10-CM

## 2024-05-24 PROCEDURE — 99213 OFFICE O/P EST LOW 20 MIN: CPT | Performed by: FAMILY MEDICINE

## 2024-05-24 PROCEDURE — 3077F SYST BP >= 140 MM HG: CPT | Performed by: FAMILY MEDICINE

## 2024-05-24 PROCEDURE — 3079F DIAST BP 80-89 MM HG: CPT | Performed by: FAMILY MEDICINE

## 2024-05-24 RX ORDER — LOSARTAN POTASSIUM 25 MG/1
25 TABLET ORAL DAILY
Qty: 90 TABLET | Refills: 3 | Status: SHIPPED | OUTPATIENT
Start: 2024-05-24

## 2024-05-24 ASSESSMENT — FIBROSIS 4 INDEX: FIB4 SCORE: 1.87

## 2024-05-24 NOTE — PROGRESS NOTES
Chief Complaint   Patient presents with    Hypertension       Subjective:     HPI:   Vandana Beaver presents today with the followin. Essential hypertension  BP is slightly better on amlodipine but she is getting some swelling.  Overall BP is more in the 130-150 range, fewer 160.  Not where it needs to be yet.  Will add losartan 25 mg per day.  If anxious and hectic this is difficult to deal with.  Her  has many severe issues.  They see doctors several times per week, often several times per day.    2. Stage 3a chronic kidney disease  Better with last test but still somewhat low.    Needs thyroid recheck.  Order placed.    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) 2024    Abnormal EKG 2024    Sedative hypnotic or anxiolytic dependence (HCC) 2023    Menopausal symptoms 2021    Hearing aid worn 2021    Splenic artery aneurysm (HCC) 2021    Chronic anxiety 2021    Simple hepatic cyst 2017    History of cataract removal with insertion of prosthetic lens 2016    Stage 3a chronic kidney disease 2015    History of pneumonia 2014    Hypothyroidism due to acquired atrophy of thyroid     History of carpal tunnel surgery of right wrist     MEDICAL HOME 10/23/2012    Seasonal allergies     Chronic insomnia     Dyslipidemia, goal LDL below 160 2010       Current medicines (including changes today)  Current Outpatient Medications   Medication Sig Dispense Refill    losartan (COZAAR) 25 MG Tab Take 1 Tablet by mouth every day. 90 Tablet 3    zolpidem (AMBIEN) 10 MG Tab Take 1 Tablet by mouth at bedtime for 180 days. 30 tablets is a 30 day quantity 30 Tablet 5    amLODIPine (NORVASC) 5 MG Tab Take 1 Tablet by mouth every day. 90 Tablet 3    estradiol  "(ESTRACE) 1 MG Tab Take 1 Tablet by mouth every day. 90 Tablet 3    diazePAM (VALIUM) 5 MG Tab Take 1 Tablet by mouth every 12 hours as needed for Anxiety for up to 90 days. 45 tablets is a 30 day quantity 45 Tablet 2    atorvastatin (LIPITOR) 10 MG Tab TAKE 1 TABLET BY MOUTH ONCE DAILY IN THE EVENING 100 Tablet 2    levothyroxine (SYNTHROID) 50 MCG Tab Take 1 Tablet by mouth every morning on an empty stomach. 90 Tablet 3    Multiple Vitamins-Minerals (MULTIVITAMIN PO) Take  by mouth every day.       No current facility-administered medications for this visit.       Allergies   Allergen Reactions    Codeine Vomiting       ROS: As per HPI  denies chest pain       Objective:     BP (!) 142/80   Pulse 77   Temp 36.7 °C (98 °F)   Resp 18   Ht 1.6 m (5' 3\")   Wt 69.9 kg (154 lb)   SpO2 97%  Body mass index is 27.28 kg/m².    Physical Exam:  Constitutional: Well-developed and well-nourished. Not diaphoretic. No distress. Lucid and fluent.  Skin: Skin is warm and dry. No rash noted.  Head: Atraumatic without lesions.  Eyes: Conjunctivae and extraocular motions are normal. Pupils are equal, round, and reactive to light. No scleral icterus.   Ears:  External ears unremarkable.   Neck: Supple, trachea midline. No thyromegaly present. No cervical or supraclavicular lymphadenopathy. No JVD or carotid bruits appreciated  Cardiovascular: Regular rate and rhythm.  Normal S1, S2 without murmur appreciated.  Chest: Effort normal. Clear to auscultation throughout. No adventitious sounds.   Extremities: No cyanosis, clubbing, erythema.  There is mild non pitting edema.  Neurological: Alert and oriented x 3. No tremor appreciated.  Psychiatric:  Behavior, mood, and affect are appropriate.       Assessment and Plan:     78 y.o. female with the following issues:    1. Essential hypertension  losartan (COZAAR) 25 MG Tab    Basic Metabolic Panel      2. Stage 3a chronic kidney disease  Basic Metabolic Panel      3. Hypothyroidism due " to acquired atrophy of thyroid  TSH            Followup: Return in about 6 weeks (around 7/3/2024), or if symptoms worsen or fail to improve.

## 2024-06-27 ENCOUNTER — HOSPITAL ENCOUNTER (OUTPATIENT)
Dept: LAB | Facility: MEDICAL CENTER | Age: 79
End: 2024-06-27
Attending: FAMILY MEDICINE
Payer: MEDICARE

## 2024-06-27 DIAGNOSIS — E03.4 HYPOTHYROIDISM DUE TO ACQUIRED ATROPHY OF THYROID: ICD-10-CM

## 2024-06-27 DIAGNOSIS — I10 ESSENTIAL HYPERTENSION: ICD-10-CM

## 2024-06-27 DIAGNOSIS — N18.31 STAGE 3A CHRONIC KIDNEY DISEASE: ICD-10-CM

## 2024-06-27 LAB
ANION GAP SERPL CALC-SCNC: 10 MMOL/L (ref 7–16)
BUN SERPL-MCNC: 18 MG/DL (ref 8–22)
CALCIUM SERPL-MCNC: 9.1 MG/DL (ref 8.4–10.2)
CHLORIDE SERPL-SCNC: 107 MMOL/L (ref 96–112)
CO2 SERPL-SCNC: 20 MMOL/L (ref 20–33)
CREAT SERPL-MCNC: 1.1 MG/DL (ref 0.5–1.4)
FASTING STATUS PATIENT QL REPORTED: NORMAL
GFR SERPLBLD CREATININE-BSD FMLA CKD-EPI: 51 ML/MIN/1.73 M 2
GLUCOSE SERPL-MCNC: 93 MG/DL (ref 65–99)
POTASSIUM SERPL-SCNC: 4.6 MMOL/L (ref 3.6–5.5)
SODIUM SERPL-SCNC: 137 MMOL/L (ref 135–145)
TSH SERPL DL<=0.005 MIU/L-ACNC: 3.27 UIU/ML (ref 0.38–5.33)

## 2024-06-27 PROCEDURE — 36415 COLL VENOUS BLD VENIPUNCTURE: CPT

## 2024-06-27 PROCEDURE — 84443 ASSAY THYROID STIM HORMONE: CPT

## 2024-06-27 PROCEDURE — 80048 BASIC METABOLIC PNL TOTAL CA: CPT

## 2024-07-03 ENCOUNTER — OFFICE VISIT (OUTPATIENT)
Dept: MEDICAL GROUP | Facility: MEDICAL CENTER | Age: 79
End: 2024-07-03
Payer: MEDICARE

## 2024-07-03 VITALS
DIASTOLIC BLOOD PRESSURE: 70 MMHG | BODY MASS INDEX: 29.06 KG/M2 | HEART RATE: 77 BPM | TEMPERATURE: 98 F | WEIGHT: 164 LBS | RESPIRATION RATE: 18 BRPM | SYSTOLIC BLOOD PRESSURE: 122 MMHG | HEIGHT: 63 IN | OXYGEN SATURATION: 96 %

## 2024-07-03 DIAGNOSIS — F41.9 CHRONIC ANXIETY: ICD-10-CM

## 2024-07-03 DIAGNOSIS — Z63.6 CAREGIVER STRESS: ICD-10-CM

## 2024-07-03 DIAGNOSIS — N18.31 STAGE 3A CHRONIC KIDNEY DISEASE: ICD-10-CM

## 2024-07-03 DIAGNOSIS — I10 ESSENTIAL HYPERTENSION: ICD-10-CM

## 2024-07-03 DIAGNOSIS — F51.04 CHRONIC INSOMNIA: ICD-10-CM

## 2024-07-03 PROCEDURE — 99213 OFFICE O/P EST LOW 20 MIN: CPT | Performed by: FAMILY MEDICINE

## 2024-07-03 PROCEDURE — 3074F SYST BP LT 130 MM HG: CPT | Performed by: FAMILY MEDICINE

## 2024-07-03 PROCEDURE — 3078F DIAST BP <80 MM HG: CPT | Performed by: FAMILY MEDICINE

## 2024-07-03 RX ORDER — ZOLPIDEM TARTRATE 10 MG/1
10 TABLET ORAL
Qty: 30 TABLET | Refills: 2 | Status: SHIPPED | OUTPATIENT
Start: 2024-07-03 | End: 2024-10-01

## 2024-07-03 RX ORDER — DIAZEPAM 5 MG/1
5 TABLET ORAL EVERY 12 HOURS PRN
Qty: 45 TABLET | Refills: 2 | Status: SHIPPED | OUTPATIENT
Start: 2024-07-30 | End: 2024-10-28

## 2024-07-03 SDOH — SOCIAL STABILITY - SOCIAL INSECURITY: DEPENDENT RELATIVE NEEDING CARE AT HOME: Z63.6

## 2024-07-03 ASSESSMENT — FIBROSIS 4 INDEX: FIB4 SCORE: 1.89

## 2024-07-08 ENCOUNTER — APPOINTMENT (OUTPATIENT)
Dept: RADIOLOGY | Facility: MEDICAL CENTER | Age: 79
End: 2024-07-08
Attending: FAMILY MEDICINE
Payer: MEDICARE

## 2024-07-10 ENCOUNTER — HOSPITAL ENCOUNTER (OUTPATIENT)
Dept: RADIOLOGY | Facility: MEDICAL CENTER | Age: 79
End: 2024-07-10
Attending: FAMILY MEDICINE
Payer: MEDICARE

## 2024-07-10 DIAGNOSIS — Z12.31 SCREENING MAMMOGRAM, ENCOUNTER FOR: ICD-10-CM

## 2024-07-10 PROCEDURE — 77063 BREAST TOMOSYNTHESIS BI: CPT

## 2024-10-28 NOTE — TELEPHONE ENCOUNTER
Was the patient seen in the last year in this department? Yes    Does patient have an active prescription for medications requested? No     Received Request Via: Pharmacy   Ambulatory

## 2024-11-06 DIAGNOSIS — F51.04 CHRONIC INSOMNIA: ICD-10-CM

## 2024-11-06 RX ORDER — ZOLPIDEM TARTRATE 10 MG/1
10 TABLET ORAL
Qty: 30 TABLET | Refills: 2 | Status: SHIPPED | OUTPATIENT
Start: 2024-11-06 | End: 2025-02-04

## 2024-11-07 NOTE — PROGRESS NOTES
Patient sent message asking me to respond to [facility] request for her zolpidem.  I have seen no request.  I have checked the PDMP.  She last filled October 10.  Her last visit here was August so she is within the 6-month timeframe.  Zolpidem renewal sent to [facility].  Have notified patient.

## 2024-11-23 NOTE — PROGRESS NOTES
"      Medical decision making:  -Working on blood pressure control.  -Amlodipine causing lower extreme edema so we will stop this for now.  -I am concerned about her NSAID use, of asked her to reduce and try Tylenol, will try to get her down to minimal NSAID use over time.  -Because of NSAID use, do not want to go very high on the losartan, also given palpitations and benches for anxiety we will add propranolol 120 mg long-acting daily.  I will increase losartan from 25 to 50 mg daily.  -I have sent a [employer] message to her PCP Dr. Lena Camp, she is ready to be treated with what she calls a \" happy pill.\"  She definitely has depression and anxiety.  She is very worried about her  having end-stage heart failure.  -I have asked her to modify how she checks her blood pressure, see below.  -Since she has some support at home right now, advised her to return to more regular cardiovascular exercise.  -Patient wanted know so far, I do not think she needs primary prevention statins.  I do not believe an incidental cerebral aneurysm requires statin therapy but rather blood pressure control.  -ECG in the office today with low voltage which is new but nothing alarming on physical exam, echocardiogram in April 2024 look good.  -I would like to see her back in 4 to 6 weeks to see how she is doing with the medication changes.    Addendum: I have already heard back from her PCP who will reach out for medication changes related to depression and anxiety.    Problem list:  1. Essential hypertension  - losartan (COZAAR) 25 MG Tab; Take 2 Tablets by mouth 2 times a day.  Dispense: 180 Tablet; Refill: 3    2. Stage 3a chronic kidney disease    3. Mixed hyperlipidemia    4. 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 Abnormal EKG  - EKG    6. NSAID long-term use    7. Palpitations  - propranolol LA (INDERAL LA) 60 MG CAPSULE SR 24 HR; Take 1 Capsule by mouth every day.  Dispense: 90 " Capsule; Refill: 3    8. Anxiety and depression  - propranolol LA (INDERAL LA) 60 MG CAPSULE SR 24 HR; Take 1 Capsule by mouth every day.  Dispense: 90 Capsule; Refill: 3    Other orders  - diazePAM (VALIUM) 5 MG Tab; Take 5 mg by mouth every 6 hours as needed for Anxiety.  - Omega-3 Fatty Acids (FISH OIL) 1000 MG Cap capsule; Take 1,000 mg by mouth 3 times a day with meals.  - Magnesium Gluconate (MAGNESIUM 27 PO); Take  by mouth.     Chief Complaint:   Chief Complaint   Patient presents with    Hypertension    Dyslipidemia    Abnormal EKG     History of Present Illness:  Vandana Beaver is a 79 y.o. female who is self-referred for HTN, palpitations, CKD 3A, incidental left A2 anterior cerebral artery aneurysm, abnormal ECG, iatrogenic ankle edema, palpitations.    Hypertension,  Had an episode of of a thought might of been a panic attack.  EMS arrived and blood pressure was as high as 200.  Daughter was concerned it could have been a heart attack.  Was evaluated in the hospital, given amlodipine which caused significant ankle swelling.    Checking at home but not often, 140 systolic.  Not noting the other numbers.    CKD 3A.    Hyperlipidemia, prior , now 96. TG elevated at times.     This ECG in the office today with low voltage not seen on prior but recent echocardiogram looks fine.  Nothing to note on physical exam.    Aneurysmal dilatation at the origin of left A2 anterior cerebral artery.    Anxiety is getting worse with some sensory deprivation from hearing loss.    No family history of premature coronary artery disease.  No prior smoking history.  No history of diabetes.  No history of autoimmune disease such as lupus or rheumatoid arthritis.  No history of chest radiation or cardiotoxic chemotherapy.  No chronic kidney disease.  No ETOH overuse.  No caffeine overuse.  No recreation substance use.  No hx asthma.    Not limited by chest pain, pressure or tightness with activity.  No significant  "dyspnea on exertion, orthopnea or lower extremity swelling.  No significant lightheadedness, or presyncope/syncope.  No symptoms of leg claudication.  No stroke/TIA like symptoms.    Lives in [place].   to Demetri has complex heart disease and sees us also.    Wt Readings from Last 5 Encounters:   11/25/24 70.3 kg (155 lb)   07/03/24 74.4 kg (164 lb)   05/24/24 69.9 kg (154 lb)   05/02/24 69.9 kg (154 lb)   04/25/24 69.9 kg (154 lb 1.6 oz)       DATA REVIEWED by me:  ECG (my personal interpretation)  11-25-24 Sinus 72, LAD, low voltage throughout  4-23-24 sinus, 73    Echo  4-25-24  No prior study is available for comparison.   Normal left ventricular chamber size.  The ejection fraction is measured to be  70% by Gannon's biplane.  Normal inferior vena cava size and inspiratory collapse.  Mild aortic insufficiency.    Most recent labs:       Lab Results   Component Value Date/Time    WBC 5.7 04/24/2024 09:51 AM    WBC 7.1 05/13/2010 07:42 AM    HEMOGLOBIN 14.0 04/24/2024 09:51 AM    HEMATOCRIT 41.7 04/24/2024 09:51 AM    MCV 96.8 04/24/2024 09:51 AM    MCV 97 05/13/2010 07:42 AM    INR 0.98 04/23/2024 08:01 PM    TSH 2.390 09/09/2010 11:20 AM      Lab Results   Component Value Date/Time    SODIUM 137 06/27/2024 12:51 PM    POTASSIUM 4.6 06/27/2024 12:51 PM    CHLORIDE 107 06/27/2024 12:51 PM    CO2 20 06/27/2024 12:51 PM    GLUCOSE 93 06/27/2024 12:51 PM    BUN 18 06/27/2024 12:51 PM    CREATININE 1.10 06/27/2024 12:51 PM    CREATININE 1.00 05/13/2010 07:42 AM    GLOMRATE 56 (L) 05/13/2010 07:42 AM      Lab Results   Component Value Date/Time    ASTSGOT 16 04/24/2024 09:51 AM    ALTSGPT 12 04/24/2024 09:51 AM    ALBUMIN 3.8 04/24/2024 09:51 AM      Lab Results   Component Value Date/Time    CHOLSTRLTOT 198 10/03/2023 11:40 AM    LDL 81 10/03/2023 11:40 AM    HDL 87 10/03/2023 11:40 AM    TRIGLYCERIDE 152 (H) 10/03/2023 11:40 AM     No results for input(s): \"NTPROBNP\", \"TROPONINT\" in the last 72 hours.      Past " Medical History:   Diagnosis Date    Anesthesia     PONV    Caregiver stress 05/02/2024    Carpal tunnel syndrome of right wrist     Cataract     Chronic insomnia     since childhood    CKD (chronic kidney disease) stage 3, GFR 30-59 ml/min     Dyslipidemia, goal LDL below 160 05/2010    History of pneumonia     Hypothyroidism     Increased intraocular pressure 06/06/2017    MEDICAL HOME 10/23/2012    Obesity (BMI 30.0-34.9)     Pneumonia 01/08/2016    Seasonal allergies      Past Surgical History:   Procedure Laterality Date    US-NEEDLE CORE BX-BREAST PANEL Left 02/2018    CATARACT PHACO WITH IOL Right 9/14/2016    Procedure: CATARACT PHACO WITH IOL KPE;  Surgeon: Wesley Barber M.D.;  Location: SURGERY SAME DAY [facility];  Service:     CATARACT PHACO WITH IOL Left 9/1/2016    Procedure: CATARACT PHACO WITH IOL;  Surgeon: Wesley Barber M.D.;  Location: SURGERY SAME DAY [facility];  Service:     CARPAL TUNNEL RELEASE Right 4/15/2016    Procedure: CARPAL TUNNEL RELEASE;  Surgeon: Chalino Urrutia M.D.;  Location: SURGERY [facility];  Service:     GIANFRANCO BY LAPAROSCOPY  11/11/2011    Performed by HECTOR TALAVERA at SURGERY [facility]    COLONOSCOPY  10/18/10    Dr. Madhu thomas    TONSILLECTOMY  1960    ABDOMINAL HYSTERECTOMY TOTAL  1980s    +ovaries + falopian, uterine fibroids     Family History   Problem Relation Age of Onset    Lung Disease Mother         66, heavy smoker, emphysema    Osteoporosis Mother     No Known Problems Father     Diabetes Maternal Grandfather     No Known Problems Daughter     No Known Problems Daughter     No Known Problems Daughter     No Known Problems Son      Social History     Socioeconomic History    Marital status:      Spouse name: Not on file    Number of children: Not on file    Years of education: Not on file    Highest education level: Not on file   Occupational History    Not on file   Tobacco Use    Smoking status: Never    Smokeless tobacco: Never    Vaping Use    Vaping status: Never Used   Substance and Sexual Activity    Alcohol use: No     Alcohol/week: 0.0 oz    Drug use: No    Sexual activity: Not Currently     Birth control/protection: Post-Menopausal   Other Topics Concern    Not on file   Social History Narrative    Not on file     Social Drivers of Health     Financial Resource Strain: Not on file   Food Insecurity: Not on file   Transportation Needs: Not on file   Physical Activity: Not on file   Stress: Not on file   Social Connections: Not on file   Intimate Partner Violence: Not on file   Housing Stability: Not on file     Allergies   Allergen Reactions    Atorvastatin      Not needed for primary prevention.  She has never actually taken the medication.    Codeine Vomiting       Current Outpatient Medications   Medication Sig Dispense Refill    diazePAM (VALIUM) 5 MG Tab Take 5 mg by mouth every 6 hours as needed for Anxiety.      Omega-3 Fatty Acids (FISH OIL) 1000 MG Cap capsule Take 1,000 mg by mouth 3 times a day with meals.      Magnesium Gluconate (MAGNESIUM 27 PO) Take  by mouth.      losartan (COZAAR) 25 MG Tab Take 2 Tablets by mouth 2 times a day. 180 Tablet 3    propranolol LA (INDERAL LA) 60 MG CAPSULE SR 24 HR Take 1 Capsule by mouth every day. 90 Capsule 3    escitalopram (LEXAPRO) 5 MG tablet Take 1 Tablet by mouth every day. 90 Tablet 1    zolpidem (AMBIEN) 10 MG Tab Take 1 Tablet by mouth at bedtime for 90 days. 30 tablets is a 30 day quantity 30 Tablet 2    estradiol (ESTRACE) 1 MG Tab Take 1 Tablet by mouth every day. 90 Tablet 3    levothyroxine (SYNTHROID) 50 MCG Tab Take 1 Tablet by mouth every morning on an empty stomach. 90 Tablet 3    Multiple Vitamins-Minerals (MULTIVITAMIN PO) Take  by mouth every day.       No current facility-administered medications for this visit.       ROS  All others systems reviewed and negative.    /78 (BP Location: Left arm, Patient Position: Sitting, BP Cuff Size: Adult)   Pulse (!) 59   " Resp 16   Ht 1.6 m (5' 3\")   Wt 70.3 kg (155 lb)   SpO2 99%  Body mass index is 27.46 kg/m².    General: No acute distress. Well nourished.  HEENT: EOM grossly intact, no scleral icterus, no pharyngeal erythema.   Neck:  No JVD, no bruits, trachea midline  CVS: RRR. Normal S1, S2. No M/R/G. No LE edema.  2+ radial pulses, 2+ PT pulses  Resp: CTAB. No wheezing or crackles/rhonchi. Normal respiratory effort.  Abdomen: Soft, NT, no arsen hepatomegaly.  MSK/Ext: No clubbing or cyanosis.  Skin: Warm and dry, no rashes.  Neurological: CN III-XII grossly intact. No focal deficits.   Psych: A&O x 3, appropriate affect, good judgement      Return in about 4 weeks (around 12/23/2024).    Written instructions given today:      *Stop amlodipine 5 mg for now but do not throw it away.  -Increase your losartan 25 mg from 1 tablet daily to either 2 tablets taken once daily or 1 tablet taken a.m. and p.m.  This will increase your total daily dose to 50 mg.  The maximum dose of this medication is 100 mg daily.  -Start propranolol 60 mg once daily.  This can be taken anytime of day.  Propranolol is a beta-blocker that can help a little bit with anxiety and stress and heart palpitations as well as blood pressure.    -If you feel worse for any reason after changing your medications, simply go back to what you are taking before and let me know so we can make changes.    -In general we do not want people taking ibuprofen, Aleve, Naprosyn daily.  It is very hard in the kidneys and drives of high blood pressure.  Try not to take more than 2 tablets daily.    -Tylenol/acetaminophen is often felt to be ineffective but people often do not take the correct dose.  I recommend taking 1000 mg of Tylenol 2 or 3 times daily.  People within normal liver can take up to 4000 mg of Tylenol daily and it is safe.    -You can use Tylenol and ibuprofen together.    It is important to check blood pressure at home from time to time at home to ensure your " blood pressure is in a good range because we do not check it correctly in the doctor's office.    Checking Blood Pressure:    -Be sure you have a good blood pressure cuff, spend in the $40-60, ideally a “calibrated” cuff such as Omron.  I recommend purchasing your cuff from a company with a good return policy.    -Your BP machine should be an automatic, upper arm cuff.  -Measure your arm circumference to get the correct size, probably adult Large.     -Sit with back support, legs uncrossed, feet flat on the floor.  Your arm should be propped up level with your heart but completely supported, dead weight.  -Put the cuff in place, then wait 5 minutes before pushing the button, 10 minutes would be ideal. No talking, no TV, no social media, etc. Set a timer on your phone.    -Do not check blood pressure within 30 minutes of exercise, caffeine, or large meals.    -It is ok to check immediately if you are experiencing a symptom which may be related to high or low blood pressure.    -Write your blood pressures down, keep a log and bring this to your appointments.    -Check no more than 1 time a day on normal days, maybe 1-2 times per week, try different times of day.  Typically do not check in the morning before your medications unless this information is helpful for you.    -You are concerned your blood pressure cuff is not giving you accurate data, you can bring your cuff to at least one nursing appointment where it can be calibrated to a manual cuff.    -Goal blood pressure is at least under 130/80, ideally under 120/80.  If you think your BP is overall too high, reach out to your PCP or another provider for assistance.    Salt restriction is key for blood pressure control. Salt is killing Americans, it is in almost everything.  Salt=sodium=sea salt, guidelines say stay under 2,400 mg daily but I ask for under 3,000 mg daily if possible.  Get salt smart, start looking at labels, count it up.  Salt is hidden in  everything, salad dressing, sauces, cheese, most canned food, any processed meat.    -You need to do some type of exercise 20 minutes every day.  I would typically say go for a walk but since it is cold out, a stationary bicycle or an indoor treadmill would be great.  Or 30 minutes 5 days a week.    It is my pleasure to participate in the care of Ms. Beaver.  Please do not hesitate to contact me with questions or concerns.    Breann Rios MD, [facility]  Cardiologist, [facility]    Please note that this dictation was created using voice recognition software. I have made every reasonable attempt to correct obvious errors, but it is possible there are errors of grammar and possibly content that I did not discover before finalizing the note.

## 2024-11-25 ENCOUNTER — OFFICE VISIT (OUTPATIENT)
Dept: CARDIOLOGY | Facility: MEDICAL CENTER | Age: 79
End: 2024-11-25
Attending: INTERNAL MEDICINE
Payer: MEDICARE

## 2024-11-25 VITALS
WEIGHT: 155 LBS | BODY MASS INDEX: 27.46 KG/M2 | OXYGEN SATURATION: 99 % | HEIGHT: 63 IN | DIASTOLIC BLOOD PRESSURE: 78 MMHG | SYSTOLIC BLOOD PRESSURE: 138 MMHG | HEART RATE: 59 BPM | RESPIRATION RATE: 16 BRPM

## 2024-11-25 DIAGNOSIS — I72.9 ANEURYSM (HCC): ICD-10-CM

## 2024-11-25 DIAGNOSIS — F43.21 SITUATIONAL DEPRESSION: ICD-10-CM

## 2024-11-25 DIAGNOSIS — R94.31 ABNORMAL EKG: ICD-10-CM

## 2024-11-25 DIAGNOSIS — E78.2 MIXED HYPERLIPIDEMIA: ICD-10-CM

## 2024-11-25 DIAGNOSIS — F32.A ANXIETY AND DEPRESSION: ICD-10-CM

## 2024-11-25 DIAGNOSIS — R00.2 PALPITATIONS: ICD-10-CM

## 2024-11-25 DIAGNOSIS — I10 ESSENTIAL HYPERTENSION: ICD-10-CM

## 2024-11-25 DIAGNOSIS — Z79.1 NSAID LONG-TERM USE: ICD-10-CM

## 2024-11-25 DIAGNOSIS — N18.31 STAGE 3A CHRONIC KIDNEY DISEASE: ICD-10-CM

## 2024-11-25 DIAGNOSIS — F41.9 ANXIETY AND DEPRESSION: ICD-10-CM

## 2024-11-25 LAB — EKG IMPRESSION: NORMAL

## 2024-11-25 PROCEDURE — 99212 OFFICE O/P EST SF 10 MIN: CPT | Performed by: INTERNAL MEDICINE

## 2024-11-25 PROCEDURE — 93005 ELECTROCARDIOGRAM TRACING: CPT | Performed by: INTERNAL MEDICINE

## 2024-11-25 RX ORDER — ESCITALOPRAM OXALATE 5 MG/1
5 TABLET ORAL DAILY
Qty: 90 TABLET | Refills: 1 | Status: SHIPPED | OUTPATIENT
Start: 2024-11-25

## 2024-11-25 RX ORDER — CHLORAL HYDRATE 500 MG
1000 CAPSULE ORAL
COMMUNITY

## 2024-11-25 RX ORDER — CHLORTHALIDONE 25 MG/1
12.5 TABLET ORAL DAILY
Qty: 45 TABLET | Refills: 3 | Status: SHIPPED | OUTPATIENT
Start: 2024-11-25 | End: 2024-11-25

## 2024-11-25 RX ORDER — DIAZEPAM 5 MG/1
5 TABLET ORAL EVERY 6 HOURS PRN
COMMUNITY

## 2024-11-25 RX ORDER — PROPRANOLOL HYDROCHLORIDE 60 MG/1
60 CAPSULE, EXTENDED RELEASE ORAL DAILY
Qty: 90 CAPSULE | Refills: 3 | Status: SHIPPED | OUTPATIENT
Start: 2024-11-25

## 2024-11-25 RX ORDER — LOSARTAN POTASSIUM 25 MG/1
50 TABLET ORAL 2 TIMES DAILY
Qty: 180 TABLET | Refills: 3 | Status: SHIPPED | OUTPATIENT
Start: 2024-11-25

## 2024-11-25 RX ORDER — LOSARTAN POTASSIUM 25 MG/1
75 TABLET ORAL DAILY
Qty: 270 TABLET | Refills: 3 | Status: SHIPPED | OUTPATIENT
Start: 2024-11-25 | End: 2024-11-25 | Stop reason: SDUPTHER

## 2024-11-25 RX ORDER — LOSARTAN POTASSIUM 25 MG/1
25 TABLET ORAL DAILY
Qty: 90 TABLET | Refills: 3 | Status: SHIPPED | OUTPATIENT
Start: 2024-11-25 | End: 2024-11-25 | Stop reason: SDUPTHER

## 2024-11-25 ASSESSMENT — FIBROSIS 4 INDEX: FIB4 SCORE: 1.89

## 2024-11-25 NOTE — PATIENT INSTRUCTIONS
*Stop amlodipine 5 mg for now but do not throw it away.  -Increase your losartan 25 mg from 1 tablet daily to either 2 tablets taken once daily or 1 tablet taken a.m. and p.m.  This will increase your total daily dose to 50 mg.  The maximum dose of this medication is 100 mg daily.  -Start propranolol 60 mg once daily.  This can be taken anytime of day.  Propranolol is a beta-blocker that can help a little bit with anxiety and stress and heart palpitations as well as blood pressure.    -If you feel worse for any reason after changing your medications, simply go back to what you are taking before and let me know so we can make changes.    -In general we do not want people taking ibuprofen, Aleve, Naprosyn daily.  It is very hard in the kidneys and drives of high blood pressure.  Try not to take more than 2 tablets daily.    -Tylenol/acetaminophen is often felt to be ineffective but people often do not take the correct dose.  I recommend taking 1000 mg of Tylenol 2 or 3 times daily.  People within normal liver can take up to 4000 mg of Tylenol daily and it is safe.    -You can use Tylenol and ibuprofen together.    It is important to check blood pressure at home from time to time at home to ensure your blood pressure is in a good range because we do not check it correctly in the doctor's office.    Checking Blood Pressure:    -Be sure you have a good blood pressure cuff, spend in the $40-60, ideally a “calibrated” cuff such as Omron.  I recommend purchasing your cuff from a company with a good return policy.    -Your BP machine should be an automatic, upper arm cuff.  -Measure your arm circumference to get the correct size, probably adult Large.     -Sit with back support, legs uncrossed, feet flat on the floor.  Your arm should be propped up level with your heart but completely supported, dead weight.  -Put the cuff in place, then wait 5 minutes before pushing the button, 10 minutes would be ideal. No talking, no TV,  no social media, etc. Set a timer on your phone.    -Do not check blood pressure within 30 minutes of exercise, caffeine, or large meals.    -It is ok to check immediately if you are experiencing a symptom which may be related to high or low blood pressure.    -Write your blood pressures down, keep a log and bring this to your appointments.    -Check no more than 1 time a day on normal days, maybe 1-2 times per week, try different times of day.  Typically do not check in the morning before your medications unless this information is helpful for you.    -You are concerned your blood pressure cuff is not giving you accurate data, you can bring your cuff to at least one nursing appointment where it can be calibrated to a manual cuff.    -Goal blood pressure is at least under 130/80, ideally under 120/80.  If you think your BP is overall too high, reach out to your PCP or another provider for assistance.    Salt restriction is key for blood pressure control. Salt is killing Americans, it is in almost everything.  Salt=sodium=sea salt, guidelines say stay under 2,400 mg daily but I ask for under 3,000 mg daily if possible.  Get salt smart, start looking at labels, count it up.  Salt is hidden in everything, salad dressing, sauces, cheese, most canned food, any processed meat.    -You need to do some type of exercise 20 minutes every day.  I would typically say go for a walk but since it is cold out, a stationary bicycle or an indoor treadmill would be great.  Or 30 minutes 5 days a week.

## 2024-11-25 NOTE — LETTER
"     [facility]   [address]   [address]  [place] [address]  Phone: [phone]  Fax: [phone]              Vandana Beaver  1945    Encounter Date: 11/25/2024    Breann Rios M.D.          PROGRESS NOTE:        Medical decision making:  -Working on blood pressure control.  -Amlodipine causing lower extreme edema so we will stop this for now.  -I am concerned about her NSAID use, of asked her to reduce and try Tylenol, will try to get her down to minimal NSAID use over time.  -Because of NSAID use, do not want to go very high on the losartan, also given palpitations and benches for anxiety we will add propranolol 120 mg long-acting daily.  I will increase losartan from 25 to 50 mg daily.  -I have sent a [employer] message to her PCP Dr. Lena Camp, she is ready to be treated with what she calls a \" happy pill.\"  She definitely has depression and anxiety.  She is very worried about her  having end-stage heart failure.  -I have asked her to modify how she checks her blood pressure, see below.  -Since she has some support at home right now, advised her to return to more regular cardiovascular exercise.  -Patient wanted know so far, I do not think she needs primary prevention statins.  I do not believe an incidental cerebral aneurysm requires statin therapy but rather blood pressure control.  -ECG in the office today with low voltage which is new but nothing alarming on physical exam, echocardiogram in April 2024 look good.  -I would like to see her back in 4 to 6 weeks to see how she is doing with the medication changes.    Addendum: I have already heard back from her PCP who will reach out for medication changes related to depression and anxiety.    Problem list:  1. Essential hypertension  - losartan (COZAAR) 25 MG Tab; Take 2 Tablets by mouth 2 times a day.  Dispense: 180 Tablet; " Refill: 3    2. Stage 3a chronic kidney disease    3. Mixed hyperlipidemia    4. 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 Abnormal EKG  - EKG    6. NSAID long-term use    7. Palpitations  - propranolol LA (INDERAL LA) 60 MG CAPSULE SR 24 HR; Take 1 Capsule by mouth every day.  Dispense: 90 Capsule; Refill: 3    8. Anxiety and depression  - propranolol LA (INDERAL LA) 60 MG CAPSULE SR 24 HR; Take 1 Capsule by mouth every day.  Dispense: 90 Capsule; Refill: 3    Other orders  - diazePAM (VALIUM) 5 MG Tab; Take 5 mg by mouth every 6 hours as needed for Anxiety.  - Omega-3 Fatty Acids (FISH OIL) 1000 MG Cap capsule; Take 1,000 mg by mouth 3 times a day with meals.  - Magnesium Gluconate (MAGNESIUM 27 PO); Take  by mouth.     Chief Complaint:   Chief Complaint   Patient presents with   • Hypertension   • Dyslipidemia   • Abnormal EKG     History of Present Illness:  Vandana Beaver is a 79 y.o. female who is self-referred for HTN, palpitations, CKD 3A, incidental left A2 anterior cerebral artery aneurysm, abnormal ECG, iatrogenic ankle edema, palpitations.    Hypertension,  Had an episode of of a thought might of been a panic attack.  EMS arrived and blood pressure was as high as 200.  Daughter was concerned it could have been a heart attack.  Was evaluated in the hospital, given amlodipine which caused significant ankle swelling.    Checking at home but not often, 140 systolic.  Not noting the other numbers.    CKD 3A.    Hyperlipidemia, prior , now 96. TG elevated at times.     This ECG in the office today with low voltage not seen on prior but recent echocardiogram looks fine.  Nothing to note on physical exam.    Aneurysmal dilatation at the origin of left A2 anterior cerebral artery.    Anxiety is getting worse with some sensory deprivation from hearing loss.    No family history of premature coronary artery disease.  No prior smoking history.  No history of  diabetes.  No history of autoimmune disease such as lupus or rheumatoid arthritis.  No history of chest radiation or cardiotoxic chemotherapy.  No chronic kidney disease.  No ETOH overuse.  No caffeine overuse.  No recreation substance use.  No hx asthma.    Not limited by chest pain, pressure or tightness with activity.  No significant dyspnea on exertion, orthopnea or lower extremity swelling.  No significant lightheadedness, or presyncope/syncope.  No symptoms of leg claudication.  No stroke/TIA like symptoms.    Lives in [place].   to Demetri has complex heart disease and sees us also.    Wt Readings from Last 5 Encounters:   11/25/24 70.3 kg (155 lb)   07/03/24 74.4 kg (164 lb)   05/24/24 69.9 kg (154 lb)   05/02/24 69.9 kg (154 lb)   04/25/24 69.9 kg (154 lb 1.6 oz)       DATA REVIEWED by me:  ECG (my personal interpretation)  11-25-24 Sinus 72, LAD, low voltage throughout  4-23-24 sinus, 73    Echo  4-25-24  No prior study is available for comparison.   Normal left ventricular chamber size.  The ejection fraction is measured to be  70% by Gannon's biplane.  Normal inferior vena cava size and inspiratory collapse.  Mild aortic insufficiency.    Most recent labs:       Lab Results   Component Value Date/Time    WBC 5.7 04/24/2024 09:51 AM    WBC 7.1 05/13/2010 07:42 AM    HEMOGLOBIN 14.0 04/24/2024 09:51 AM    HEMATOCRIT 41.7 04/24/2024 09:51 AM    MCV 96.8 04/24/2024 09:51 AM    MCV 97 05/13/2010 07:42 AM    INR 0.98 04/23/2024 08:01 PM    TSH 2.390 09/09/2010 11:20 AM      Lab Results   Component Value Date/Time    SODIUM 137 06/27/2024 12:51 PM    POTASSIUM 4.6 06/27/2024 12:51 PM    CHLORIDE 107 06/27/2024 12:51 PM    CO2 20 06/27/2024 12:51 PM    GLUCOSE 93 06/27/2024 12:51 PM    BUN 18 06/27/2024 12:51 PM    CREATININE 1.10 06/27/2024 12:51 PM    CREATININE 1.00 05/13/2010 07:42 AM    GLOMRATE 56 (L) 05/13/2010 07:42 AM      Lab Results   Component Value Date/Time    ASTSGOT 16 04/24/2024 09:51 AM     "ALTSGPT 12 04/24/2024 09:51 AM    ALBUMIN 3.8 04/24/2024 09:51 AM      Lab Results   Component Value Date/Time    CHOLSTRLTOT 198 10/03/2023 11:40 AM    LDL 81 10/03/2023 11:40 AM    HDL 87 10/03/2023 11:40 AM    TRIGLYCERIDE 152 (H) 10/03/2023 11:40 AM     No results for input(s): \"NTPROBNP\", \"TROPONINT\" in the last 72 hours.      Past Medical History:   Diagnosis Date   • Anesthesia     PONV   • Caregiver stress 05/02/2024   • Carpal tunnel syndrome of right wrist    • Cataract    • Chronic insomnia     since childhood   • CKD (chronic kidney disease) stage 3, GFR 30-59 ml/min    • Dyslipidemia, goal LDL below 160 05/2010   • History of pneumonia    • Hypothyroidism    • Increased intraocular pressure 06/06/2017   • MEDICAL HOME 10/23/2012   • Obesity (BMI 30.0-34.9)    • Pneumonia 01/08/2016   • Seasonal allergies      Past Surgical History:   Procedure Laterality Date   • US-NEEDLE CORE BX-BREAST PANEL Left 02/2018   • CATARACT PHACO WITH IOL Right 9/14/2016    Procedure: CATARACT PHACO WITH IOL KPE;  Surgeon: Wesley Barber M.D.;  Location: SURGERY SAME DAY [facility];  Service:    • CATARACT PHACO WITH IOL Left 9/1/2016    Procedure: CATARACT PHACO WITH IOL;  Surgeon: Wesley Barber M.D.;  Location: SURGERY SAME DAY [facility];  Service:    • CARPAL TUNNEL RELEASE Right 4/15/2016    Procedure: CARPAL TUNNEL RELEASE;  Surgeon: Chalino Urrutia M.D.;  Location: SURGERY [facility];  Service:    • GIANFRANCO BY LAPAROSCOPY  11/11/2011    Performed by [facility]AVERA at [facility]   • COLONOSCOPY  10/18/10    Dr. Madhu thomas   • TONSILLECTOMY  1960   • ABDOMINAL HYSTERECTOMY TOTAL  1980s    +ovaries + falopian, uterine fibroids     Family History   Problem Relation Age of Onset   • Lung Disease Mother         66, heavy smoker, emphysema   • Osteoporosis Mother    • No Known Problems Father    • Diabetes Maternal Grandfather    • No Known Problems Daughter    • No Known Problems Daughter    • No " Known Problems Daughter    • No Known Problems Son      Social History     Socioeconomic History   • Marital status:      Spouse name: Not on file   • Number of children: Not on file   • Years of education: Not on file   • Highest education level: Not on file   Occupational History   • Not on file   Tobacco Use   • Smoking status: Never   • Smokeless tobacco: Never   Vaping Use   • Vaping status: Never Used   Substance and Sexual Activity   • Alcohol use: No     Alcohol/week: 0.0 oz   • Drug use: No   • Sexual activity: Not Currently     Birth control/protection: Post-Menopausal   Other Topics Concern   • Not on file   Social History Narrative   • Not on file     Social Drivers of Health     Financial Resource Strain: Not on file   Food Insecurity: Not on file   Transportation Needs: Not on file   Physical Activity: Not on file   Stress: Not on file   Social Connections: Not on file   Intimate Partner Violence: Not on file   Housing Stability: Not on file     Allergies   Allergen Reactions   • Atorvastatin      Not needed for primary prevention.  She has never actually taken the medication.   • Codeine Vomiting       Current Outpatient Medications   Medication Sig Dispense Refill   • diazePAM (VALIUM) 5 MG Tab Take 5 mg by mouth every 6 hours as needed for Anxiety.     • Omega-3 Fatty Acids (FISH OIL) 1000 MG Cap capsule Take 1,000 mg by mouth 3 times a day with meals.     • Magnesium Gluconate (MAGNESIUM 27 PO) Take  by mouth.     • losartan (COZAAR) 25 MG Tab Take 2 Tablets by mouth 2 times a day. 180 Tablet 3   • propranolol LA (INDERAL LA) 60 MG CAPSULE SR 24 HR Take 1 Capsule by mouth every day. 90 Capsule 3   • escitalopram (LEXAPRO) 5 MG tablet Take 1 Tablet by mouth every day. 90 Tablet 1   • zolpidem (AMBIEN) 10 MG Tab Take 1 Tablet by mouth at bedtime for 90 days. 30 tablets is a 30 day quantity 30 Tablet 2   • estradiol (ESTRACE) 1 MG Tab Take 1 Tablet by mouth every day. 90 Tablet 3   •  "levothyroxine (SYNTHROID) 50 MCG Tab Take 1 Tablet by mouth every morning on an empty stomach. 90 Tablet 3   • Multiple Vitamins-Minerals (MULTIVITAMIN PO) Take  by mouth every day.       No current facility-administered medications for this visit.       ROS  All others systems reviewed and negative.    /78 (BP Location: Left arm, Patient Position: Sitting, BP Cuff Size: Adult)   Pulse (!) 59   Resp 16   Ht 1.6 m (5' 3\")   Wt 70.3 kg (155 lb)   SpO2 99%  Body mass index is 27.46 kg/m².    General: No acute distress. Well nourished.  HEENT: EOM grossly intact, no scleral icterus, no pharyngeal erythema.   Neck:  No JVD, no bruits, trachea midline  CVS: RRR. Normal S1, S2. No M/R/G. No LE edema.  2+ radial pulses, 2+ PT pulses  Resp: CTAB. No wheezing or crackles/rhonchi. Normal respiratory effort.  Abdomen: Soft, NT, no arsen hepatomegaly.  MSK/Ext: No clubbing or cyanosis.  Skin: Warm and dry, no rashes.  Neurological: CN III-XII grossly intact. No focal deficits.   Psych: A&O x 3, appropriate affect, good judgement      Return in about 4 weeks (around 12/23/2024).    Written instructions given today:      *Stop amlodipine 5 mg for now but do not throw it away.  -Increase your losartan 25 mg from 1 tablet daily to either 2 tablets taken once daily or 1 tablet taken a.m. and p.m.  This will increase your total daily dose to 50 mg.  The maximum dose of this medication is 100 mg daily.  -Start propranolol 60 mg once daily.  This can be taken anytime of day.  Propranolol is a beta-blocker that can help a little bit with anxiety and stress and heart palpitations as well as blood pressure.    -If you feel worse for any reason after changing your medications, simply go back to what you are taking before and let me know so we can make changes.    -In general we do not want people taking ibuprofen, Aleve, Naprosyn daily.  It is very hard in the kidneys and drives of high blood pressure.  Try not to take more than " 2 tablets daily.    -Tylenol/acetaminophen is often felt to be ineffective but people often do not take the correct dose.  I recommend taking 1000 mg of Tylenol 2 or 3 times daily.  People within normal liver can take up to 4000 mg of Tylenol daily and it is safe.    -You can use Tylenol and ibuprofen together.    It is important to check blood pressure at home from time to time at home to ensure your blood pressure is in a good range because we do not check it correctly in the doctor's office.    Checking Blood Pressure:    -Be sure you have a good blood pressure cuff, spend in the $40-60, ideally a “calibrated” cuff such as Omron.  I recommend purchasing your cuff from a company with a good return policy.    -Your BP machine should be an automatic, upper arm cuff.  -Measure your arm circumference to get the correct size, probably adult Large.     -Sit with back support, legs uncrossed, feet flat on the floor.  Your arm should be propped up level with your heart but completely supported, dead weight.  -Put the cuff in place, then wait 5 minutes before pushing the button, 10 minutes would be ideal. No talking, no TV, no social media, etc. Set a timer on your phone.    -Do not check blood pressure within 30 minutes of exercise, caffeine, or large meals.    -It is ok to check immediately if you are experiencing a symptom which may be related to high or low blood pressure.    -Write your blood pressures down, keep a log and bring this to your appointments.    -Check no more than 1 time a day on normal days, maybe 1-2 times per week, try different times of day.  Typically do not check in the morning before your medications unless this information is helpful for you.    -You are concerned your blood pressure cuff is not giving you accurate data, you can bring your cuff to at least one nursing appointment where it can be calibrated to a manual cuff.    -Goal blood pressure is at least under 130/80, ideally under 120/80.   If you think your BP is overall too high, reach out to your PCP or another provider for assistance.    Salt restriction is key for blood pressure control. Salt is killing Americans, it is in almost everything.  Salt=sodium=sea salt, guidelines say stay under 2,400 mg daily but I ask for under 3,000 mg daily if possible.  Get salt smart, start looking at labels, count it up.  Salt is hidden in everything, salad dressing, sauces, cheese, most canned food, any processed meat.    -You need to do some type of exercise 20 minutes every day.  I would typically say go for a walk but since it is cold out, a stationary bicycle or an indoor treadmill would be great.  Or 30 minutes 5 days a week.    It is my pleasure to participate in the care of Ms. Beaver.  Please do not hesitate to contact me with questions or concerns.    Breann Rios MD, [facility]  Cardiologist, [facility]    Please note that this dictation was created using voice recognition software. I have made every reasonable attempt to correct obvious errors, but it is possible there are errors of grammar and possibly content that I did not discover before finalizing the note.      Esteban Camp M.D.  [address] [place] [address] [place] [id number]  Via In Basket

## 2024-12-03 DIAGNOSIS — E03.4 HYPOTHYROIDISM DUE TO ACQUIRED ATROPHY OF THYROID: ICD-10-CM

## 2024-12-03 RX ORDER — LEVOTHYROXINE SODIUM 50 UG/1
50 TABLET ORAL
Qty: 90 TABLET | Refills: 2 | Status: SHIPPED | OUTPATIENT
Start: 2024-12-03

## 2024-12-13 DIAGNOSIS — E78.5 DYSLIPIDEMIA, GOAL LDL BELOW 160: ICD-10-CM

## 2024-12-13 RX ORDER — ATORVASTATIN CALCIUM 10 MG/1
TABLET, FILM COATED ORAL
Qty: 100 TABLET | Refills: 0 | Status: SHIPPED | OUTPATIENT
Start: 2024-12-13

## 2025-01-08 ENCOUNTER — APPOINTMENT (OUTPATIENT)
Dept: MEDICAL GROUP | Facility: MEDICAL CENTER | Age: 80
End: 2025-01-08
Payer: MEDICARE

## 2025-01-10 ENCOUNTER — PATIENT MESSAGE (OUTPATIENT)
Dept: HEALTH INFORMATION MANAGEMENT | Facility: OTHER | Age: 80
End: 2025-01-10

## 2025-01-10 ENCOUNTER — PATIENT OUTREACH (OUTPATIENT)
Dept: HEALTH INFORMATION MANAGEMENT | Facility: OTHER | Age: 80
End: 2025-01-10
Payer: MEDICARE

## 2025-01-10 NOTE — PROGRESS NOTES
Community Health Worker Follow-Up    Reason for outreach: CHW called the pt to introduce the PCM program.    CHW Interventions: This CHW called the pt to introduce the PCM program. Pt stated she wad still in bed and would like a call back next week in the afternoon. Pt stated she was concerned about Dr Camp and her being out. This CHW reassured the pt that the provider was fine and that she was back in the office. This CHWc will send the pt a [employer] message with the program details.    Specific Resources Provided:  Housing/Shelter: n/a  Transportation: n/a  Food: n/a  Financial: n/a  Social Supports: n/a  Other: MyChart message    Plan: CHW will follow up with the pt next week to introduce the PCM program.

## 2025-01-14 ENCOUNTER — OFFICE VISIT (OUTPATIENT)
Dept: CARDIOLOGY | Facility: MEDICAL CENTER | Age: 80
End: 2025-01-14
Attending: INTERNAL MEDICINE
Payer: MEDICARE

## 2025-01-14 VITALS
WEIGHT: 155 LBS | DIASTOLIC BLOOD PRESSURE: 62 MMHG | SYSTOLIC BLOOD PRESSURE: 136 MMHG | BODY MASS INDEX: 27.46 KG/M2 | OXYGEN SATURATION: 95 % | RESPIRATION RATE: 16 BRPM | HEART RATE: 56 BPM | HEIGHT: 63 IN

## 2025-01-14 DIAGNOSIS — F43.21 SITUATIONAL DEPRESSION: ICD-10-CM

## 2025-01-14 DIAGNOSIS — Z79.1 NSAID LONG-TERM USE: ICD-10-CM

## 2025-01-14 DIAGNOSIS — F41.9 ANXIETY AND DEPRESSION: ICD-10-CM

## 2025-01-14 DIAGNOSIS — I10 ESSENTIAL HYPERTENSION: ICD-10-CM

## 2025-01-14 DIAGNOSIS — E78.5 DYSLIPIDEMIA, GOAL LDL BELOW 160: ICD-10-CM

## 2025-01-14 DIAGNOSIS — R94.31 ABNORMAL EKG: ICD-10-CM

## 2025-01-14 DIAGNOSIS — N18.31 STAGE 3A CHRONIC KIDNEY DISEASE: ICD-10-CM

## 2025-01-14 DIAGNOSIS — I72.9 ANEURYSM (HCC): ICD-10-CM

## 2025-01-14 DIAGNOSIS — E78.2 MIXED HYPERLIPIDEMIA: ICD-10-CM

## 2025-01-14 DIAGNOSIS — F32.A ANXIETY AND DEPRESSION: ICD-10-CM

## 2025-01-14 DIAGNOSIS — R00.2 PALPITATIONS: ICD-10-CM

## 2025-01-14 PROCEDURE — 99214 OFFICE O/P EST MOD 30 MIN: CPT | Performed by: INTERNAL MEDICINE

## 2025-01-14 PROCEDURE — 3075F SYST BP GE 130 - 139MM HG: CPT | Performed by: INTERNAL MEDICINE

## 2025-01-14 PROCEDURE — 3078F DIAST BP <80 MM HG: CPT | Performed by: INTERNAL MEDICINE

## 2025-01-14 PROCEDURE — 99212 OFFICE O/P EST SF 10 MIN: CPT | Performed by: INTERNAL MEDICINE

## 2025-01-14 RX ORDER — DIAZEPAM 5 MG/1
2.5 TABLET ORAL EVERY 6 HOURS PRN
COMMUNITY
Start: 2025-01-14

## 2025-01-14 RX ORDER — PROPRANOLOL HYDROCHLORIDE 60 MG/1
60 CAPSULE, EXTENDED RELEASE ORAL EVERY EVENING
Qty: 90 CAPSULE | Refills: 3 | Status: SHIPPED | OUTPATIENT
Start: 2025-01-14

## 2025-01-14 RX ORDER — LOSARTAN POTASSIUM 25 MG/1
100 TABLET ORAL DAILY
Qty: 400 TABLET | Refills: 3 | Status: SHIPPED | OUTPATIENT
Start: 2025-01-14

## 2025-01-14 RX ORDER — ESCITALOPRAM OXALATE 5 MG/1
5 TABLET ORAL DAILY
COMMUNITY
Start: 2025-01-14

## 2025-01-14 ASSESSMENT — FIBROSIS 4 INDEX: FIB4 SCORE: 1.89

## 2025-01-14 NOTE — LETTER
[facility] - Operated by [facility]   [address], [address]  [place] [address]  Phone: [phone]  Fax: [phone]              Vandana Beaver  1945    Encounter Date: 1/14/2025    Breann Rios M.D.          PROGRESS NOTE:        Medical decision making:  -Working on blood pressure control.  I think she will need to go up on losartan, will use 25 mg tablets for now so she has control but can change to 100 mg tablet down the roard..  -She is not certain that propranolol helped her but she is willing to stay on it for now.  I will ask her to start taking it in p.m.  -She has come off the diazepam.  I encouraged her to use it as needed.  -On low-dose Lexapro which is great.  She did not want to go higher due to side effects.  -Since she has some support at home right now, advised her to return to more regular cardiovascular exercise. I have asked her to walk 20 min on the treadmill in the gym.   -I do not think she needs primary prevention statins.  I do not believe an incidental cerebral aneurysm requires statin therapy but rather blood pressure control.  -ECG with low voltage which is new but nothing alarming on physical exam, echocardiogram in April 2024 look good.  -RTC with me in 2 months    Problem list:  1. Essential hypertension  - losartan (COZAAR) 25 MG Tab; Take 4 Tablets by mouth every day.  Dispense: 400 Tablet; Refill: 3    2. Stage 3a chronic kidney disease    3. Mixed hyperlipidemia    4. 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 Abnormal EKG    6. Palpitations  - propranolol LA (INDERAL LA) 60 MG CAPSULE SR 24 HR; Take 1 Capsule by mouth every evening.  Dispense: 90 Capsule; Refill: 3    7. Anxiety and depression  - propranolol LA (INDERAL LA) 60 MG CAPSULE SR 24 HR; Take 1 Capsule by mouth every evening.  Dispense: 90 Capsule; Refill: 3    8. NSAID long-term use    9.  Dyslipidemia, goal LDL below 160    10. Situational depression  - escitalopram (LEXAPRO) 5 MG tablet; Take 1 Tablet by mouth every day. 0.5 tab morning and 0.5 tab evening  - diazePAM (VALIUM) 5 MG Tab; Take 0.5 Tablets by mouth every 6 hours as needed for Anxiety.     Chief Complaint:   Chief Complaint   Patient presents with   • Follow-Up     Essential hypertension     • Dyslipidemia   • Abnormal EKG     History of Present Illness:  Vandana Beaver is a 79 y.o. 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    Hypertension, working on control.  Checking at home now and writing it down.  Had an episode of of a thought might of been a panic attack.  EMS arrived and blood pressure was as high as 200.  Daughter was concerned it could have been a heart attack.  Was evaluated in the hospital, given amlodipine which caused significant ankle swelling.  We stopped amlodipine due to ankle edema.  She does not think it made any difference.  Blood pressure still fairly consistently in the 130s but better, used to be 140s to 150s.  Diastolic overall is controlled in the 60s to 70s.  Titrating up losartan.    Stopping amlodipine probably helped with ankle swelling.    We started on propranolol because of palpitations, left her feeling a little foggy and not certain if it is helping.    CKD 3A.    Hyperlipidemia, prior , now 96. TG elevated at times.   On statin and fish oil.    This ECG in the office today with low voltage not seen on prior but recent echocardiogram looks fine.  Nothing to note on physical exam.    Aneurysmal dilatation at the origin of left A2 anterior cerebral artery.    Anxiety is getting worse with some sensory deprivation from hearing loss.    No family history of premature coronary artery disease.  No prior smoking history.  No history of diabetes.  No history of autoimmune disease such as lupus or  rheumatoid arthritis.  No history of chest radiation or cardiotoxic chemotherapy.  No chronic kidney disease.  No ETOH overuse.  No caffeine overuse.  No recreation substance use.  No hx asthma.    Not limited by chest pain, pressure or tightness with activity.  No significant dyspnea on exertion, orthopnea or lower extremity swelling.  No significant lightheadedness, or presyncope/syncope.  No symptoms of leg claudication.  No stroke/TIA like symptoms.    Lives in [place].   to Demetri has complex heart disease and sees us also.  Adult daughter and son-in-law have been staying at their house.    Wt Readings from Last 5 Encounters:   01/14/25 70.3 kg (155 lb)   11/25/24 70.3 kg (155 lb)   07/03/24 74.4 kg (164 lb)   05/24/24 69.9 kg (154 lb)   05/02/24 69.9 kg (154 lb)       DATA REVIEWED by me:  ECG (my personal interpretation)  11-25-24 Sinus 72, LAD, low voltage throughout  4-23-24 sinus, 73    Echo  4-25-24  No prior study is available for comparison.   Normal left ventricular chamber size.  The ejection fraction is measured to be  70% by Gannon's biplane.  Normal inferior vena cava size and inspiratory collapse.  Mild aortic insufficiency.    Most recent labs:       Lab Results   Component Value Date/Time    WBC 5.7 04/24/2024 09:51 AM    WBC 7.1 05/13/2010 07:42 AM    HEMOGLOBIN 14.0 04/24/2024 09:51 AM    HEMATOCRIT 41.7 04/24/2024 09:51 AM    MCV 96.8 04/24/2024 09:51 AM    MCV 97 05/13/2010 07:42 AM    INR 0.98 04/23/2024 08:01 PM    TSH 2.390 09/09/2010 11:20 AM      Lab Results   Component Value Date/Time    SODIUM 137 06/27/2024 12:51 PM    POTASSIUM 4.6 06/27/2024 12:51 PM    CHLORIDE 107 06/27/2024 12:51 PM    CO2 20 06/27/2024 12:51 PM    GLUCOSE 93 06/27/2024 12:51 PM    BUN 18 06/27/2024 12:51 PM    CREATININE 1.10 06/27/2024 12:51 PM    CREATININE 1.00 05/13/2010 07:42 AM    GLOMRATE 56 (L) 05/13/2010 07:42 AM      Lab Results   Component Value Date/Time    ASTSGOT 16 04/24/2024 09:51 AM     "ALTSGPT 12 04/24/2024 09:51 AM    ALBUMIN 3.8 04/24/2024 09:51 AM      Lab Results   Component Value Date/Time    CHOLSTRLTOT 198 10/03/2023 11:40 AM    LDL 81 10/03/2023 11:40 AM    HDL 87 10/03/2023 11:40 AM    TRIGLYCERIDE 152 (H) 10/03/2023 11:40 AM     No results for input(s): \"NTPROBNP\", \"TROPONINT\" in the last 72 hours.      Past Medical History:   Diagnosis Date   • Anesthesia     PONV   • Caregiver stress 05/02/2024   • Carpal tunnel syndrome of right wrist    • Cataract    • Chronic insomnia     since childhood   • CKD (chronic kidney disease) stage 3, GFR 30-59 ml/min    • Dyslipidemia, goal LDL below 160 05/2010   • History of pneumonia    • Hypothyroidism    • Increased intraocular pressure 06/06/2017   • MEDICAL HOME 10/23/2012   • Obesity (BMI 30.0-34.9)    • Pneumonia 01/08/2016   • Seasonal allergies      Past Surgical History:   Procedure Laterality Date   • US-NEEDLE CORE BX-BREAST PANEL Left 02/2018   • CATARACT PHACO WITH IOL Right 9/14/2016    Procedure: CATARACT PHACO WITH IOL KPE;  Surgeon: Wesley Barber M.D.;  Location: SURGERY SAME DAY [facility];  Service:    • CATARACT PHACO WITH IOL Left 9/1/2016    Procedure: CATARACT PHACO WITH IOL;  Surgeon: Wesley Barber M.D.;  Location: SURGERY SAME DAY [facility];  Service:    • CARPAL TUNNEL RELEASE Right 4/15/2016    Procedure: CARPAL TUNNEL RELEASE;  Surgeon: Chalino Urrutia M.D.;  Location: SURGERY [facility];  Service:    • GIANFRANCO BY LAPAROSCOPY  11/11/2011    Performed by [facility]ERA at [facility]   • COLONOSCOPY  10/18/10    Dr. Madhu thomas   • TONSILLECTOMY  1960   • ABDOMINAL HYSTERECTOMY TOTAL  1980s    +ovaries + falopian, uterine fibroids     Family History   Problem Relation Age of Onset   • Lung Disease Mother         66, heavy smoker, emphysema   • Osteoporosis Mother    • No Known Problems Father    • Diabetes Maternal Grandfather    • No Known Problems Daughter    • No Known Problems Daughter    • No " Known Problems Daughter    • No Known Problems Son      Social History     Socioeconomic History   • Marital status:      Spouse name: Not on file   • Number of children: Not on file   • Years of education: Not on file   • Highest education level: Not on file   Occupational History   • Not on file   Tobacco Use   • Smoking status: Never   • Smokeless tobacco: Never   Vaping Use   • Vaping status: Never Used   Substance and Sexual Activity   • Alcohol use: Yes     Comment: very seldom   • Drug use: No   • Sexual activity: Not Currently     Birth control/protection: Post-Menopausal   Other Topics Concern   • Not on file   Social History Narrative   • Not on file     Social Drivers of Health     Financial Resource Strain: Not on file   Food Insecurity: Not on file   Transportation Needs: Not on file   Physical Activity: Not on file   Stress: Not on file   Social Connections: Not on file   Intimate Partner Violence: Not on file   Housing Stability: Not on file     Allergies   Allergen Reactions   • Atorvastatin      Not needed for primary prevention.  She has never actually taken the medication.   • Codeine Vomiting       Current Outpatient Medications   Medication Sig Dispense Refill   • escitalopram (LEXAPRO) 5 MG tablet Take 1 Tablet by mouth every day. 0.5 tab morning and 0.5 tab evening     • losartan (COZAAR) 25 MG Tab Take 4 Tablets by mouth every day. 400 Tablet 3   • diazePAM (VALIUM) 5 MG Tab Take 0.5 Tablets by mouth every 6 hours as needed for Anxiety.     • propranolol LA (INDERAL LA) 60 MG CAPSULE SR 24 HR Take 1 Capsule by mouth every evening. 90 Capsule 3   • atorvastatin (LIPITOR) 10 MG Tab TAKE 1 TABLET BY MOUTH ONCE DAILY IN THE EVENING 100 Tablet 0   • levothyroxine (SYNTHROID) 50 MCG Tab TAKE 1 TABLET BY MOUTH IN THE MORNING ON AN EMPTY STOMACH 90 Tablet 2   • Magnesium Gluconate (MAGNESIUM 27 PO) Take  by mouth.     • zolpidem (AMBIEN) 10 MG Tab Take 1 Tablet by mouth at bedtime for 90 days.  "30 tablets is a 30 day quantity 30 Tablet 2   • estradiol (ESTRACE) 1 MG Tab Take 1 Tablet by mouth every day. 90 Tablet 3   • Multiple Vitamins-Minerals (MULTIVITAMIN PO) Take  by mouth every day.       No current facility-administered medications for this visit.       ROS  All others systems reviewed and negative.    /62 (BP Location: Left arm, Patient Position: Sitting, BP Cuff Size: Adult)   Pulse (!) 56   Resp 16   Ht 1.6 m (5' 3\")   Wt 70.3 kg (155 lb)   SpO2 95%  Body mass index is 27.46 kg/m².    General: No acute distress. Well nourished.  HEENT: EOM grossly intact, no scleral icterus, no pharyngeal erythema.   Neck:  No JVD, no bruits, trachea midline  CVS: RRR. Normal S1, S2. No M/R/G. No LE edema.  2+ radial pulses, 2+ PT pulses  Resp: CTAB. No wheezing or crackles/rhonchi. Normal respiratory effort.  Abdomen: Soft, NT, no arsen hepatomegaly.  MSK/Ext: No clubbing or cyanosis.  Skin: Warm and dry, no rashes.  Neurological: CN III-XII grossly intact. No focal deficits.   Psych: A&O x 3, appropriate affect, good judgement    Physical Exam listed below was completed in entrety today and unchanged from 11/25/2024 except where noted.  Some elements were copied from my note of that same day, which have been updated where appropriate and all reflect current meedical decision making from today.  I have confirmed and edited as necessary, the PFSH and ROS obtained by others.    Return in about 2 months (around 3/14/2025).    Written instructions given today:      -Your current total daily dose of losartan is 75 mg.  I suspect you will need the full 100 mg which is the maximum dose.  When you are ready, increase to 4 tablets daily.  You can take all 4 tablets at the same time or you can take 2 in the morning and 2 in the evening.    -In my opinion, you do not need fish oil.    -Stay on the propranolol 60 mg but start taking it in the evening or before bedtime.    -Tried to go for a walk 20 minutes on a " treadmill at least 5 days a week.  Do not change her close and make a big procession out of it, simply put on some good tennis shoes, walking to the gym and walk on the treadmill in your street close.  You do not have to sweat profusely and you do not have to take a shower after.    It is my pleasure to participate in the care of Ms. Beaver.  Please do not hesitate to contact me with questions or concerns.    Breann Rios MD, [facility]  Cardiologist, [facility]    Please note that this dictation was created using voice recognition software. I have made every reasonable attempt to correct obvious errors, but it is possible there are errors of grammar and possibly content that I did not discover before finalizing the note.      Esteban Camp M.D.  [address] [place] [address] [place] [address]  Via In Basket

## 2025-01-14 NOTE — PROGRESS NOTES
Medical decision making:  -Working on blood pressure control.  I think she will need to go up on losartan, will use 25 mg tablets for now so she has control but can change to 100 mg tablet down the roard..  -She is not certain that propranolol helped her but she is willing to stay on it for now.  I will ask her to start taking it in p.m.  -She has come off the diazepam.  I encouraged her to use it as needed.  -On low-dose Lexapro which is great.  She did not want to go higher due to side effects.  -Since she has some support at home right now, advised her to return to more regular cardiovascular exercise. I have asked her to walk 20 min on the treadmill in the gym.   -I do not think she needs primary prevention statins.  I do not believe an incidental cerebral aneurysm requires statin therapy but rather blood pressure control.  -ECG with low voltage which is new but nothing alarming on physical exam, echocardiogram in April 2024 look good.  -RTC with me in 2 months    Problem list:  1. Essential hypertension  - losartan (COZAAR) 25 MG Tab; Take 4 Tablets by mouth every day.  Dispense: 400 Tablet; Refill: 3    2. Stage 3a chronic kidney disease    3. Mixed hyperlipidemia    4. 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 Abnormal EKG    6. Palpitations  - propranolol LA (INDERAL LA) 60 MG CAPSULE SR 24 HR; Take 1 Capsule by mouth every evening.  Dispense: 90 Capsule; Refill: 3    7. Anxiety and depression  - propranolol LA (INDERAL LA) 60 MG CAPSULE SR 24 HR; Take 1 Capsule by mouth every evening.  Dispense: 90 Capsule; Refill: 3    8. NSAID long-term use    9. Dyslipidemia, goal LDL below 160    10. Situational depression  - escitalopram (LEXAPRO) 5 MG tablet; Take 1 Tablet by mouth every day. 0.5 tab morning and 0.5 tab evening  - diazePAM (VALIUM) 5 MG Tab; Take 0.5 Tablets by mouth every 6 hours as needed for Anxiety.     Chief Complaint:   Chief Complaint   Patient  presents with    Follow-Up     Essential hypertension      Dyslipidemia    Abnormal EKG     History of Present Illness:  Vandana Beaver is a 79 y.o. 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    Hypertension, working on control.  Checking at home now and writing it down.  Had an episode of of a thought might of been a panic attack.  EMS arrived and blood pressure was as high as 200.  Daughter was concerned it could have been a heart attack.  Was evaluated in the hospital, given amlodipine which caused significant ankle swelling.  We stopped amlodipine due to ankle edema.  She does not think it made any difference.  Blood pressure still fairly consistently in the 130s but better, used to be 140s to 150s.  Diastolic overall is controlled in the 60s to 70s.  Titrating up losartan.    Stopping amlodipine probably helped with ankle swelling.    We started on propranolol because of palpitations, left her feeling a little foggy and not certain if it is helping.    CKD 3A.    Hyperlipidemia, prior , now 96. TG elevated at times.   On statin and fish oil.    This ECG in the office today with low voltage not seen on prior but recent echocardiogram looks fine.  Nothing to note on physical exam.    Aneurysmal dilatation at the origin of left A2 anterior cerebral artery.    Anxiety is getting worse with some sensory deprivation from hearing loss.    No family history of premature coronary artery disease.  No prior smoking history.  No history of diabetes.  No history of autoimmune disease such as lupus or rheumatoid arthritis.  No history of chest radiation or cardiotoxic chemotherapy.  No chronic kidney disease.  No ETOH overuse.  No caffeine overuse.  No recreation substance use.  No hx asthma.    Not limited by chest pain, pressure or tightness with activity.  No significant dyspnea on exertion, orthopnea or lower  "extremity swelling.  No significant lightheadedness, or presyncope/syncope.  No symptoms of leg claudication.  No stroke/TIA like symptoms.    Lives in [place].   to Demetri has complex heart disease and sees us also.  Adult daughter and son-in-law have been staying at their house.    Wt Readings from Last 5 Encounters:   01/14/25 70.3 kg (155 lb)   11/25/24 70.3 kg (155 lb)   07/03/24 74.4 kg (164 lb)   05/24/24 69.9 kg (154 lb)   05/02/24 69.9 kg (154 lb)       DATA REVIEWED by me:  ECG (my personal interpretation)  11-25-24 Sinus 72, LAD, low voltage throughout  4-23-24 sinus, 73    Echo  4-25-24  No prior study is available for comparison.   Normal left ventricular chamber size.  The ejection fraction is measured to be  70% by Gannon's biplane.  Normal inferior vena cava size and inspiratory collapse.  Mild aortic insufficiency.    Most recent labs:       Lab Results   Component Value Date/Time    WBC 5.7 04/24/2024 09:51 AM    WBC 7.1 05/13/2010 07:42 AM    HEMOGLOBIN 14.0 04/24/2024 09:51 AM    HEMATOCRIT 41.7 04/24/2024 09:51 AM    MCV 96.8 04/24/2024 09:51 AM    MCV 97 05/13/2010 07:42 AM    INR 0.98 04/23/2024 08:01 PM    TSH 2.390 09/09/2010 11:20 AM      Lab Results   Component Value Date/Time    SODIUM 137 06/27/2024 12:51 PM    POTASSIUM 4.6 06/27/2024 12:51 PM    CHLORIDE 107 06/27/2024 12:51 PM    CO2 20 06/27/2024 12:51 PM    GLUCOSE 93 06/27/2024 12:51 PM    BUN 18 06/27/2024 12:51 PM    CREATININE 1.10 06/27/2024 12:51 PM    CREATININE 1.00 05/13/2010 07:42 AM    GLOMRATE 56 (L) 05/13/2010 07:42 AM      Lab Results   Component Value Date/Time    ASTSGOT 16 04/24/2024 09:51 AM    ALTSGPT 12 04/24/2024 09:51 AM    ALBUMIN 3.8 04/24/2024 09:51 AM      Lab Results   Component Value Date/Time    CHOLSTRLTOT 198 10/03/2023 11:40 AM    LDL 81 10/03/2023 11:40 AM    HDL 87 10/03/2023 11:40 AM    TRIGLYCERIDE 152 (H) 10/03/2023 11:40 AM     No results for input(s): \"NTPROBNP\", \"TROPONINT\" in the last 72 " hours.      Past Medical History:   Diagnosis Date    Anesthesia     PONV    Caregiver stress 05/02/2024    Carpal tunnel syndrome of right wrist     Cataract     Chronic insomnia     since childhood    CKD (chronic kidney disease) stage 3, GFR 30-59 ml/min     Dyslipidemia, goal LDL below 160 05/2010    History of pneumonia     Hypothyroidism     Increased intraocular pressure 06/06/2017    MEDICAL HOME 10/23/2012    Obesity (BMI 30.0-34.9)     Pneumonia 01/08/2016    Seasonal allergies      Past Surgical History:   Procedure Laterality Date    US-NEEDLE CORE BX-BREAST PANEL Left 02/2018    CATARACT PHACO WITH IOL Right 9/14/2016    Procedure: CATARACT PHACO WITH IOL KPE;  Surgeon: Wesley Barber M.D.;  Location: SURGERY SAME DAY [facility] ORS;  Service:     CATARACT PHACO WITH IOL Left 9/1/2016    Procedure: CATARACT PHACO WITH IOL;  Surgeon: Wesley Barber M.D.;  Location: SURGERY SAME DAY [facility] ORS;  Service:     CARPAL TUNNEL RELEASE Right 4/15/2016    Procedure: CARPAL TUNNEL RELEASE;  Surgeon: Chalino Urrutia M.D.;  Location: SURGERY [facility];  Service:     GIANFRANCO BY LAPAROSCOPY  11/11/2011    Performed by HECTOR TALAVERA at SURGERY [facility]    COLONOSCOPY  10/18/10    Dr. Madhu thomas    TONSILLECTOMY  1960    ABDOMINAL HYSTERECTOMY TOTAL  1980s    +ovaries + falopian, uterine fibroids     Family History   Problem Relation Age of Onset    Lung Disease Mother         66, heavy smoker, emphysema    Osteoporosis Mother     No Known Problems Father     Diabetes Maternal Grandfather     No Known Problems Daughter     No Known Problems Daughter     No Known Problems Daughter     No Known Problems Son      Social History     Socioeconomic History    Marital status:      Spouse name: Not on file    Number of children: Not on file    Years of education: Not on file    Highest education level: Not on file   Occupational History    Not on file   Tobacco Use    Smoking status: Never     Smokeless tobacco: Never   Vaping Use    Vaping status: Never Used   Substance and Sexual Activity    Alcohol use: Yes     Comment: very seldom    Drug use: No    Sexual activity: Not Currently     Birth control/protection: Post-Menopausal   Other Topics Concern    Not on file   Social History Narrative    Not on file     Social Drivers of Health     Financial Resource Strain: Not on file   Food Insecurity: Not on file   Transportation Needs: Not on file   Physical Activity: Not on file   Stress: Not on file   Social Connections: Not on file   Intimate Partner Violence: Not on file   Housing Stability: Not on file     Allergies   Allergen Reactions    Atorvastatin      Not needed for primary prevention.  She has never actually taken the medication.    Codeine Vomiting       Current Outpatient Medications   Medication Sig Dispense Refill    escitalopram (LEXAPRO) 5 MG tablet Take 1 Tablet by mouth every day. 0.5 tab morning and 0.5 tab evening      losartan (COZAAR) 25 MG Tab Take 4 Tablets by mouth every day. 400 Tablet 3    diazePAM (VALIUM) 5 MG Tab Take 0.5 Tablets by mouth every 6 hours as needed for Anxiety.      propranolol LA (INDERAL LA) 60 MG CAPSULE SR 24 HR Take 1 Capsule by mouth every evening. 90 Capsule 3    atorvastatin (LIPITOR) 10 MG Tab TAKE 1 TABLET BY MOUTH ONCE DAILY IN THE EVENING 100 Tablet 0    levothyroxine (SYNTHROID) 50 MCG Tab TAKE 1 TABLET BY MOUTH IN THE MORNING ON AN EMPTY STOMACH 90 Tablet 2    Magnesium Gluconate (MAGNESIUM 27 PO) Take  by mouth.      zolpidem (AMBIEN) 10 MG Tab Take 1 Tablet by mouth at bedtime for 90 days. 30 tablets is a 30 day quantity 30 Tablet 2    estradiol (ESTRACE) 1 MG Tab Take 1 Tablet by mouth every day. 90 Tablet 3    Multiple Vitamins-Minerals (MULTIVITAMIN PO) Take  by mouth every day.       No current facility-administered medications for this visit.       ROS  All others systems reviewed and negative.    /62 (BP Location: Left arm, Patient  "Position: Sitting, BP Cuff Size: Adult)   Pulse (!) 56   Resp 16   Ht 1.6 m (5' 3\")   Wt 70.3 kg (155 lb)   SpO2 95%  Body mass index is 27.46 kg/m².    General: No acute distress. Well nourished.  HEENT: EOM grossly intact, no scleral icterus, no pharyngeal erythema.   Neck:  No JVD, no bruits, trachea midline  CVS: RRR. Normal S1, S2. No M/R/G. No LE edema.  2+ radial pulses, 2+ PT pulses  Resp: CTAB. No wheezing or crackles/rhonchi. Normal respiratory effort.  Abdomen: Soft, NT, no arsen hepatomegaly.  MSK/Ext: No clubbing or cyanosis.  Skin: Warm and dry, no rashes.  Neurological: CN III-XII grossly intact. No focal deficits.   Psych: A&O x 3, appropriate affect, good judgement    Physical Exam listed below was completed in entrety today and unchanged from 11/25/2024 except where noted.  Some elements were copied from my note of that same day, which have been updated where appropriate and all reflect current meedical decision making from today.  I have confirmed and edited as necessary, the PFSH and ROS obtained by others.    Return in about 2 months (around 3/14/2025).    Written instructions given today:      -Your current total daily dose of losartan is 75 mg.  I suspect you will need the full 100 mg which is the maximum dose.  When you are ready, increase to 4 tablets daily.  You can take all 4 tablets at the same time or you can take 2 in the morning and 2 in the evening.    -In my opinion, you do not need fish oil.    -Stay on the propranolol 60 mg but start taking it in the evening or before bedtime.    -Tried to go for a walk 20 minutes on a treadmill at least 5 days a week.  Do not change her close and make a big procession out of it, simply put on some good tennis shoes, walking to the gym and walk on the treadmill in your street close.  You do not have to sweat profusely and you do not have to take a shower after.    It is my pleasure to participate in the care of Ms. Beaver.  Please do not " hesitate to contact me with questions or concerns.    Breann Rios MD, [facility]  Cardiologist, [facility]    Please note that this dictation was created using voice recognition software. I have made every reasonable attempt to correct obvious errors, but it is possible there are errors of grammar and possibly content that I did not discover before finalizing the note.

## 2025-01-15 ENCOUNTER — PATIENT OUTREACH (OUTPATIENT)
Dept: HEALTH INFORMATION MANAGEMENT | Facility: OTHER | Age: 80
End: 2025-01-15
Payer: MEDICARE

## 2025-01-15 NOTE — PROGRESS NOTES
CHW tried to call the pt to follow up and introduce the PCM program. Pt did not answer and this CHW left   requesting a return call 1/15  Community Health Worker Follow-Up    Reason for outreach: CHW tried clling the pt to introduce.    CHW Interventions: This CHW tried calling the pt to introduce as requested by the pt. Pt did not answer after several attempts. CHW will send another [facility] message.    Specific Resources Provided:  Housing/Shelter: n/a  Transportation: n/a  Food: n/a  Financial: n/a  Social Supports: n/a  Other: MyChart message    Plan: This CHW sent the pt another MyChart message. This CHW will not follow at this time.   Pt sent a MyChart message back stating she did not want the program right now.

## 2025-01-15 NOTE — PATIENT INSTRUCTIONS
-Your current total daily dose of losartan is 75 mg.  I suspect you will need the full 100 mg which is the maximum dose.  When you are ready, increase to 4 tablets daily.  You can take all 4 tablets at the same time or you can take 2 in the morning and 2 in the evening.    -In my opinion, you do not need fish oil.    -Stay on the propranolol 60 mg but start taking it in the evening or before bedtime.    -Tried to go for a walk 20 minutes on a treadmill at least 5 days a week.  Do not change her close and make a big procession out of it, simply put on some good tennis shoes, walking to the gym and walk on the treadmill in your street close.  You do not have to sweat profusely and you do not have to take a shower after.

## 2025-01-16 ENCOUNTER — PATIENT MESSAGE (OUTPATIENT)
Dept: HEALTH INFORMATION MANAGEMENT | Facility: OTHER | Age: 80
End: 2025-01-16

## 2025-01-20 ENCOUNTER — PATIENT MESSAGE (OUTPATIENT)
Dept: CARDIOLOGY | Facility: MEDICAL CENTER | Age: 80
End: 2025-01-20
Payer: MEDICARE

## 2025-01-21 ENCOUNTER — PATIENT MESSAGE (OUTPATIENT)
Dept: CARDIOLOGY | Facility: MEDICAL CENTER | Age: 80
End: 2025-01-21
Payer: MEDICARE

## 2025-02-12 ENCOUNTER — OFFICE VISIT (OUTPATIENT)
Dept: MEDICAL GROUP | Facility: MEDICAL CENTER | Age: 80
End: 2025-02-12
Payer: MEDICARE

## 2025-02-12 VITALS
SYSTOLIC BLOOD PRESSURE: 160 MMHG | WEIGHT: 150.6 LBS | BODY MASS INDEX: 26.68 KG/M2 | HEIGHT: 63 IN | OXYGEN SATURATION: 96 % | HEART RATE: 54 BPM | DIASTOLIC BLOOD PRESSURE: 74 MMHG | TEMPERATURE: 97.8 F

## 2025-02-12 DIAGNOSIS — R73.01 ELEVATED FASTING GLUCOSE: ICD-10-CM

## 2025-02-12 DIAGNOSIS — Z63.6 CAREGIVER STRESS: ICD-10-CM

## 2025-02-12 DIAGNOSIS — M17.0 PRIMARY OSTEOARTHRITIS OF BOTH KNEES: ICD-10-CM

## 2025-02-12 DIAGNOSIS — F51.04 CHRONIC INSOMNIA: ICD-10-CM

## 2025-02-12 DIAGNOSIS — I10 ESSENTIAL HYPERTENSION: ICD-10-CM

## 2025-02-12 DIAGNOSIS — E78.5 DYSLIPIDEMIA, GOAL LDL BELOW 160: ICD-10-CM

## 2025-02-12 DIAGNOSIS — Z23 NEED FOR VACCINATION: ICD-10-CM

## 2025-02-12 DIAGNOSIS — F41.9 CHRONIC ANXIETY: ICD-10-CM

## 2025-02-12 PROCEDURE — 3078F DIAST BP <80 MM HG: CPT | Performed by: FAMILY MEDICINE

## 2025-02-12 PROCEDURE — G0008 ADMIN INFLUENZA VIRUS VAC: HCPCS | Performed by: FAMILY MEDICINE

## 2025-02-12 PROCEDURE — 99214 OFFICE O/P EST MOD 30 MIN: CPT | Mod: 25 | Performed by: FAMILY MEDICINE

## 2025-02-12 PROCEDURE — 3077F SYST BP >= 140 MM HG: CPT | Performed by: FAMILY MEDICINE

## 2025-02-12 PROCEDURE — 90662 IIV NO PRSV INCREASED AG IM: CPT | Performed by: FAMILY MEDICINE

## 2025-02-12 RX ORDER — ZOLPIDEM TARTRATE 10 MG/1
10 TABLET ORAL
COMMUNITY
Start: 2025-01-31 | End: 2025-02-12

## 2025-02-12 RX ORDER — ZOLPIDEM TARTRATE 10 MG/1
10 TABLET ORAL
Qty: 30 TABLET | Refills: 2 | Status: SHIPPED | OUTPATIENT
Start: 2025-02-28 | End: 2025-05-29

## 2025-02-12 RX ORDER — LOSARTAN POTASSIUM 100 MG/1
100 TABLET ORAL DAILY
Qty: 100 TABLET | Refills: 3 | Status: SHIPPED | OUTPATIENT
Start: 2025-02-12

## 2025-02-12 SDOH — SOCIAL STABILITY - SOCIAL INSECURITY: DEPENDENT RELATIVE NEEDING CARE AT HOME: Z63.6

## 2025-02-12 ASSESSMENT — FIBROSIS 4 INDEX: FIB4 SCORE: 1.89

## 2025-02-12 ASSESSMENT — PATIENT HEALTH QUESTIONNAIRE - PHQ9: CLINICAL INTERPRETATION OF PHQ2 SCORE: 0

## 2025-02-12 NOTE — PROGRESS NOTES
Chief Complaint   Patient presents with    Follow-Up    Knee Pain    Hypertension    Dyslipidemia    Anxiety       Subjective:     HPI:   Vandana Beaver presents today with the followin. Essential hypertension  Still quite high.  Very stressed today.  Her  is quite ill.  Grief from loss of family members the last two months.  Losartan has been increased to 100 mg per day.  Rewritten.  Discussed.   Lab orders discussed and placed.     2. Dyslipidemia, goal LDL below 160  Patient denies chest pain, chest pressure, palpitations or exertional shortness of breath. Patient is not on lipid lowering medication, not needed per cardiology. Patient is a never smoker. Patient takes no aspirin daily. Patient has no history of myocardial infarction, stroke or PVD.     3. Primary osteoarthritis of both knees  Knees are bothering her more, both of them, stiffness.    4. Caregiver stress  Her 's health is declining.  He had quite a severe fall recently and that has been very upsetting.  He is recovering but there has been increased stress.  She is grateful that her daughter and son-in-law have moved in with them.  They have been quite helpful.  However that is an adjustment as well.    5. Chronic anxiety  Taking the lexapro.  Seems to be somewhat helpful.  Has diazepam to take if needed but no longer taking regularly.    6. Need for vaccination  Administered today.    7. Chronic insomnia  Zolpidem is helpful and is renewed.  Getting 4-5 hours of sleep.  PDMP review shows no inconsistencies.    8. Elevated fasting glucose  Mild elevation in the past, lab orders placed.  - Comp Metabolic Panel; Future  - HEMOGLOBIN A1C; Future      Patient Active Problem List    Diagnosis Date Noted    Primary osteoarthritis of both knees 2025    Situational depression 2024    Essential hypertension 2024    Caregiver stress 2024    Aneurysmal dilation of the origin left A2 anterior cerebral artery  (HCC) 04/23/2024    Abnormal EKG 04/23/2024    Sedative hypnotic or anxiolytic dependence (HCC) 07/20/2023    Menopausal symptoms 11/01/2021    Hearing aid worn 11/01/2021    Splenic artery aneurysm (HCC) 07/14/2021    Chronic anxiety 07/14/2021    Simple hepatic cyst 06/06/2017    History of cataract removal with insertion of prosthetic lens 09/14/2016    Stage 3a chronic kidney disease 05/21/2015    History of pneumonia 02/19/2014    Hypothyroidism due to acquired atrophy of thyroid     History of carpal tunnel surgery of right wrist     MEDICAL HOME 10/23/2012    Seasonal allergies     Chronic insomnia     Dyslipidemia, goal LDL below 160 05/01/2010       Current medicines (including changes today)  Current Outpatient Medications   Medication Sig Dispense Refill    losartan (COZAAR) 100 MG Tab Take 1 Tablet by mouth every day. 100 Tablet 3    [START ON 2/28/2025] zolpidem (AMBIEN) 10 MG Tab Take 1 Tablet by mouth at bedtime for 90 days. 30 tablets is a 30 day quantity 30 Tablet 2    escitalopram (LEXAPRO) 5 MG tablet Take 1 Tablet by mouth every day. 0.5 tab morning and 0.5 tab evening      propranolol LA (INDERAL LA) 60 MG CAPSULE SR 24 HR Take 1 Capsule by mouth every evening. 90 Capsule 3    atorvastatin (LIPITOR) 10 MG Tab TAKE 1 TABLET BY MOUTH ONCE DAILY IN THE EVENING 100 Tablet 0    levothyroxine (SYNTHROID) 50 MCG Tab TAKE 1 TABLET BY MOUTH IN THE MORNING ON AN EMPTY STOMACH 90 Tablet 2    Magnesium Gluconate (MAGNESIUM 27 PO) Take  by mouth.      estradiol (ESTRACE) 1 MG Tab Take 1 Tablet by mouth every day. 90 Tablet 3    Multiple Vitamins-Minerals (MULTIVITAMIN PO) Take  by mouth every day.      diazePAM (VALIUM) 5 MG Tab Take 0.5 Tablets by mouth every 6 hours as needed for Anxiety. (Patient not taking: Reported on 2/12/2025)       No current facility-administered medications for this visit.       Allergies   Allergen Reactions    Atorvastatin      Not needed for primary prevention.  She has never  "actually taken the medication.    Codeine Vomiting       ROS: As per HPI       Objective:     BP (!) 160/74   Pulse (!) 54   Temp 36.6 °C (97.8 °F) (Temporal)   Ht 1.6 m (5' 3\")   Wt 68.3 kg (150 lb 9.6 oz)   SpO2 96%  Body mass index is 26.68 kg/m².  Anxious today.    Physical Exam:  Constitutional: Well-developed and well-nourished. Not diaphoretic. No distress. Lucid and fluent.  Skin: Skin is warm and dry. No rash noted.  Head: Atraumatic without lesions.  Eyes: Conjunctivae and extraocular motions are normal. Pupils are equal, round, and reactive to light. No scleral icterus.   Ears:  External ears unremarkable.   Neck: Supple, trachea midline. No thyromegaly present. No cervical or supraclavicular lymphadenopathy. No JVD or carotid bruits appreciated  Cardiovascular: Regular rate and rhythm.  Normal S1, S2 without murmur appreciated.  Chest: Effort normal. Clear to auscultation throughout. No adventitious sounds.   Abdomen: Soft, non tender, and without distention. Active bowel sounds in all four quadrants. No rebound, guarding, masses or hepatosplenomegaly.  Extremities: No cyanosis, clubbing, erythema, nor edema.   Neurological: Alert and oriented x 3. No tremor appreciated.  Psychiatric:  Behavior, mood, and affect are appropriate.       Assessment and Plan:     79 y.o. female with the following issues:    1. Essential hypertension  losartan (COZAAR) 100 MG Tab    Comp Metabolic Panel    CBC WITHOUT DIFFERENTIAL      2. Dyslipidemia, goal LDL below 160  Comp Metabolic Panel    CBC WITHOUT DIFFERENTIAL    TSH      3. Primary osteoarthritis of both knees  Referral to Orthopedics      4. Caregiver stress        5. Chronic anxiety        6. Need for vaccination  INFLUENZA VACCINE, HIGH DOSE (65+ ONLY)      7. Chronic insomnia  zolpidem (AMBIEN) 10 MG Tab      8. Elevated fasting glucose  Comp Metabolic Panel    HEMOGLOBIN A1C            Followup: Return in about 6 months (around 8/12/2025), or if symptoms " worsen or fail to improve.

## 2025-02-15 NOTE — Clinical Note
REFERRAL APPROVAL NOTICE         Sent on February 14, 2025                   Vandana Nasreenarthur Beaver  [address] [place] [address]                   Dear Ms. Beaver,    After a careful review of the medical information and benefit coverage, [facility] has processed your referral. See below for additional details.    If applicable, you must be actively enrolled with your insurance for coverage of the authorized service. If you have any questions regarding your coverage, please contact your insurance directly.    REFERRAL INFORMATION   Referral #:  [id number]  Referred-To Department    Referred-By Provider:  Orthopedics    Esteban Camp M.D.   [facility] (joint)      [address] [facility] [address]  [place] [address]  [phone] [address] [place] [address]  [place] [address]  [phone]    Referral Start Date:  02/12/2025  Referral End Date:   02/12/2026             SCHEDULING  If you do not already have an appointment, please call [phone] to make an appointment.     MORE INFORMATION  If you do not already have a [employer] account, sign up at: [email or web address]  You can access your medical information, make appointments, see lab results, billing information, and more.  If you have questions regarding this referral, please contact  the [facility] Referrals department at:             [phone]. Monday - Friday 8:00AM - 5:00PM.     Sincerely,    [facility]

## 2025-02-21 PROBLEM — F13.20 SEDATIVE, HYPNOTIC OR ANXIOLYTIC DEPENDENCE, UNCOMPLICATED (HCC): Status: ACTIVE | Noted: 2025-02-21

## 2025-03-21 ENCOUNTER — PATIENT MESSAGE (OUTPATIENT)
Dept: CARDIOLOGY | Facility: MEDICAL CENTER | Age: 80
End: 2025-03-21
Payer: MEDICARE

## 2025-03-24 ENCOUNTER — PATIENT MESSAGE (OUTPATIENT)
Dept: CARDIOLOGY | Facility: MEDICAL CENTER | Age: 80
End: 2025-03-24
Payer: MEDICARE

## 2025-03-24 DIAGNOSIS — I10 ESSENTIAL HYPERTENSION: ICD-10-CM

## 2025-03-24 RX ORDER — AMLODIPINE BESYLATE 2.5 MG/1
2.5 TABLET ORAL DAILY
Qty: 100 TABLET | Refills: 3 | Status: SHIPPED | OUTPATIENT
Start: 2025-03-24 | End: 2026-04-28

## 2025-03-26 ENCOUNTER — APPOINTMENT (OUTPATIENT)
Dept: CARDIOLOGY | Facility: MEDICAL CENTER | Age: 80
End: 2025-03-26
Attending: INTERNAL MEDICINE
Payer: MEDICARE

## 2025-03-30 DIAGNOSIS — E78.5 DYSLIPIDEMIA, GOAL LDL BELOW 160: ICD-10-CM

## 2025-03-31 RX ORDER — ATORVASTATIN CALCIUM 10 MG/1
10 TABLET, FILM COATED ORAL EVERY EVENING
Qty: 100 TABLET | Refills: 3 | Status: SHIPPED | OUTPATIENT
Start: 2025-03-31

## 2025-04-16 ENCOUNTER — TELEPHONE (OUTPATIENT)
Dept: HEALTH INFORMATION MANAGEMENT | Facility: OTHER | Age: 80
End: 2025-04-16

## 2025-05-04 NOTE — PROGRESS NOTES
Medical decision making:  -Working on blood pressure control. Her numbers look better overall but her family has concerns that she is essentially cheating by checking multiple times and running down the lower numbers.  There is a strategy to checking 3 times in a row and averaging but my preference is to check this 1 time.  -She stopped the Valium at the same time she started the propranolol so she is not sure it is working.  I have asked her to increase propranolol 1 more time to see if she is better or worse.  If is not helping, she can discontinue the medication.  -Had intermittent sinus resting bradycardia but I do not think is causing problems and I am okay with her trying the propranolol elevated dose.  -If we have evidence of symptomatic bradycardia then again we would stop the propranolol together.  -I had previously encouraged her to take the diazepam if it was needed but I also did warn her about the possibility of becoming a habit.  She has not used it.  I have reached out her PCP to coordinate care in this regard.  -I do not think she needs primary prevention statins.  I do not believe an incidental cerebral aneurysm requires statin therapy but rather blood pressure control.  -She believes that her hearing loss has led to reduced quality of life, again reached out to PCP in this regard as other hearing aids have not worked.  *Vandana has a lot on her plate worrying about Demetri.  She struggles to acclimate to having adult children in the home to help.  Her family is concerned about her health.  I have asked her to participate in regular counseling to develop coping skills as this is turning into a long-term situation.  -RTC with me in 4 weeks to go over increased dose of propranolol and blood pressure.  Also to reinforce getting in with a counselor.    Problem list:  1. Essential hypertension    2. Sinus bradycardia    3. Anxiety and depression    4. Stage 3a chronic kidney disease    5. Mixed  hyperlipidemia    6. 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 Abnormal EKG    8. Palpitations    9. Other specified hearing loss of both ears       Chief Complaint:   Chief Complaint   Patient presents with    Hypertension    Dyslipidemia     History of Present Illness:  Vandana Beaver is a 79 y.o. 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    Hypertension, working on control.  Checking at home now and writing it down.  Had an episode of of a thought might of been a panic attack.  EMS arrived and blood pressure was as high as 200.  Daughter was concerned it could have been a heart attack.  Was evaluated in the hospital, given amlodipine which caused significant ankle swelling.  We stopped amlodipine due to ankle edema.  She does not think it made any difference, has been added back at 2.5 mg.  Blood pressure still fairly consistently in the 130s but better, used to be 140s to 150s.  Diastolic overall is controlled in the 60s to 70s.  Titrating up losartan.  I did receive a message from her daughter since her last visit the blood pressures have been too high and that she takes it multiple times a day and only writes down the lowest.  I believe it turns out she is taking it 3 times and averaging but I cannot be certain if she is actually averaging or just taking the lowest numbers.  Overall her blood pressure numbers [facility] today do look better.    Noting heart rate in the 40s to 50s but no symptoms of bradycardia.    Higher doses of amlodipine caused worsening CVI.    We started on propranolol because of palpitations, left her feeling a little foggy and not certain if it is helping.    CKD 3A.    Hyperlipidemia, prior , now 96. TG elevated at times.   On statin and fish oil.    This ECG with low voltage not seen on prior but recent echocardiogram looks fine.  Nothing to  note on physical exam.    Aneurysmal dilatation at the origin of left A2 anterior cerebral artery.    Anxiety is getting worse with some sensory deprivation from hearing loss.  Working on hearing aids.  I have asked her to see a counselor.  Previously giving diazepam but reluctant to take it because it can be habit-forming.    Having knee pain, sees Dr. Samir Abraham, getting injections.  She does not want to consider surgery right now because she is helping Demetri.    No family history of premature coronary artery disease.  No prior smoking history.  No history of diabetes.  No history of autoimmune disease such as lupus or rheumatoid arthritis.  No history of chest radiation or cardiotoxic chemotherapy.  No chronic kidney disease.  No ETOH overuse.  No caffeine overuse.  No recreation substance use.  No hx asthma.    Not limited by chest pain, pressure or tightness with activity.  No significant dyspnea on exertion, orthopnea or lower extremity swelling.  No significant lightheadedness, or presyncope/syncope.  No symptoms of leg claudication.  No stroke/TIA like symptoms.    Lives in [place].   to Demetri has complex heart disease and sees us also.  Adult daughter and son-in-law have been staying at their house.    Wt Readings from Last 5 Encounters:   05/07/25 65.3 kg (144 lb)   02/12/25 68.3 kg (150 lb 9.6 oz)   01/14/25 70.3 kg (155 lb)   11/25/24 70.3 kg (155 lb)   07/03/24 74.4 kg (164 lb)       DATA REVIEWED by me:  ECG (my personal interpretation)  11-25-24 Sinus 72, LAD, low voltage throughout  4-23-24 sinus, 73    Echo  4-25-24  No prior study is available for comparison.   Normal left ventricular chamber size.  The ejection fraction is measured to be  70% by Gannon's biplane.  Normal inferior vena cava size and inspiratory collapse.  Mild aortic insufficiency.    Most recent labs:       Lab Results   Component Value Date/Time    WBC 5.7 04/24/2024 09:51 AM    WBC 7.1 05/13/2010 07:42 AM    HEMOGLOBIN 14.0  "04/24/2024 09:51 AM    HEMATOCRIT 41.7 04/24/2024 09:51 AM    MCV 96.8 04/24/2024 09:51 AM    MCV 97 05/13/2010 07:42 AM    INR 0.98 04/23/2024 08:01 PM    TSH 2.390 09/09/2010 11:20 AM      Lab Results   Component Value Date/Time    SODIUM 137 06/27/2024 12:51 PM    POTASSIUM 4.6 06/27/2024 12:51 PM    CHLORIDE 107 06/27/2024 12:51 PM    CO2 20 06/27/2024 12:51 PM    GLUCOSE 93 06/27/2024 12:51 PM    BUN 18 06/27/2024 12:51 PM    CREATININE 1.10 06/27/2024 12:51 PM    CREATININE 1.00 05/13/2010 07:42 AM    GLOMRATE 56 (L) 05/13/2010 07:42 AM      Lab Results   Component Value Date/Time    ASTSGOT 16 04/24/2024 09:51 AM    ALTSGPT 12 04/24/2024 09:51 AM    ALBUMIN 3.8 04/24/2024 09:51 AM      Lab Results   Component Value Date/Time    CHOLSTRLTOT 198 10/03/2023 11:40 AM    LDL 81 10/03/2023 11:40 AM    HDL 87 10/03/2023 11:40 AM    TRIGLYCERIDE 152 (H) 10/03/2023 11:40 AM     No results for input(s): \"NTPROBNP\", \"TROPONINT\" in the last 72 hours.      Past Medical History:   Diagnosis Date    Anesthesia     PONV    Caregiver stress 05/02/2024    Carpal tunnel syndrome of right wrist     Cataract     Chronic insomnia     since childhood    CKD (chronic kidney disease) stage 3, GFR 30-59 ml/min     Dyslipidemia, goal LDL below 160 05/2010    History of pneumonia     Hypothyroidism     Increased intraocular pressure 06/06/2017    MEDICAL HOME 10/23/2012    Obesity (BMI 30.0-34.9)     Pneumonia 01/08/2016    Seasonal allergies      Past Surgical History:   Procedure Laterality Date    US-NEEDLE CORE BX-BREAST PANEL Left 02/2018    CATARACT PHACO WITH IOL Right 9/14/2016    Procedure: CATARACT PHACO WITH IOL KPE;  Surgeon: Wesley Barber M.D.;  Location: SURGERY SAME DAY [facility];  Service:     CATARACT PHACO WITH IOL Left 9/1/2016    Procedure: CATARACT PHACO WITH IOL;  Surgeon: Wesley Barber M.D.;  Location: SURGERY SAME DAY [facility];  Service:     CARPAL TUNNEL RELEASE Right 4/15/2016    Procedure: CARPAL " TUNNEL RELEASE;  Surgeon: Chalino Urrutia M.D.;  Location: SURGERY [facility];  Service:     GIANFRANCO BY LAPAROSCOPY  11/11/2011    Performed by HECTOR TALAVERA at SURGERY [facility]    COLONOSCOPY  10/18/10    Dr. Madhu thomas    TONSILLECTOMY  1960    ABDOMINAL HYSTERECTOMY TOTAL  1980s    +ovaries + falopian, uterine fibroids     Family History   Problem Relation Age of Onset    Lung Disease Mother         66, heavy smoker, emphysema    Osteoporosis Mother     No Known Problems Father     Diabetes Maternal Grandfather     No Known Problems Daughter     No Known Problems Daughter     No Known Problems Daughter     No Known Problems Son      Social History     Socioeconomic History    Marital status:      Spouse name: Not on file    Number of children: Not on file    Years of education: Not on file    Highest education level: Not on file   Occupational History    Not on file   Tobacco Use    Smoking status: Never    Smokeless tobacco: Never   Vaping Use    Vaping status: Never Used   Substance and Sexual Activity    Alcohol use: Yes     Comment: very seldom    Drug use: No    Sexual activity: Not Currently     Birth control/protection: Post-Menopausal   Other Topics Concern    Not on file   Social History Narrative    Not on file     Social Drivers of Health     Financial Resource Strain: Not on file   Food Insecurity: Not on file   Transportation Needs: Not on file   Physical Activity: Not on file   Stress: Not on file   Social Connections: Not on file   Intimate Partner Violence: Not on file   Housing Stability: Not on file     Allergies   Allergen Reactions    Atorvastatin      Not needed for primary prevention.  She has never actually taken the medication.    Codeine Vomiting       Current Outpatient Medications   Medication Sig Dispense Refill    atorvastatin (LIPITOR) 10 MG Tab TAKE 1 TABLET BY MOUTH ONCE DAILY IN THE EVENING 100 Tablet 3    amLODIPine (NORVASC) 2.5 MG Tab Take 1 Tablet by  "mouth every day. 100 Tablet 3    losartan (COZAAR) 100 MG Tab Take 1 Tablet by mouth every day. 100 Tablet 3    zolpidem (AMBIEN) 10 MG Tab Take 1 Tablet by mouth at bedtime for 90 days. 30 tablets is a 30 day quantity 30 Tablet 2    escitalopram (LEXAPRO) 5 MG tablet Take 1 Tablet by mouth every day. 0.5 tab morning and 0.5 tab evening      propranolol LA (INDERAL LA) 60 MG CAPSULE SR 24 HR Take 1 Capsule by mouth every evening. 90 Capsule 3    levothyroxine (SYNTHROID) 50 MCG Tab TAKE 1 TABLET BY MOUTH IN THE MORNING ON AN EMPTY STOMACH 90 Tablet 2    Magnesium Gluconate (MAGNESIUM 27 PO) Take  by mouth.      estradiol (ESTRACE) 1 MG Tab Take 1 Tablet by mouth every day. 90 Tablet 3    Multiple Vitamins-Minerals (MULTIVITAMIN PO) Take  by mouth every day.       No current facility-administered medications for this visit.       ROS  All others systems reviewed and negative.    /72 (BP Location: Right arm, Patient Position: Sitting, BP Cuff Size: Adult)   Pulse (!) 59   Resp 16   Ht 1.6 m (5' 3\")   Wt 65.3 kg (144 lb)   SpO2 98%  Body mass index is 25.51 kg/m².    General: No acute distress. Well nourished.  HEENT: EOM grossly intact, no scleral icterus, no pharyngeal erythema.   Neck:  No JVD, no bruits, trachea midline  CVS: RRR. Normal S1, S2. No M/R/G. No LE edema.  2+ radial pulses, 2+ PT pulses  Resp: CTAB. No wheezing or crackles/rhonchi. Normal respiratory effort.  Abdomen: Soft, NT, no arsen hepatomegaly.  MSK/Ext: No clubbing or cyanosis.  Skin: Warm and dry, no rashes.  Neurological: CN III-XII grossly intact. No focal deficits.   Psych: A&O x 3, appropriate affect, good judgement, less anxious today    Physical Exam listed below was completed in entrety today and unchanged from 1-14-25 except where noted.  Some elements were copied from my note of that same day, which have been updated where appropriate and all reflect current meedical decision making from today.  I have confirmed and edited " as necessary, the PFSH and ROS obtained by others.    Return in about 4 weeks (around 6/4/2025).    Written instructions given today:      - Increase your propranolol from 1 tablet to 2 tablets.  This will take your total daily dose to 120 mg.  I want to make an extreme step up because I want to know if you feel better or worse.  If you feel worse for any reason, go back down to 1 tablet for 2 weeks, then discontinue the medication altogether. If taking 2 pills helps you feel better, then stay on it.    -It is okay with me if the heart rates in the 40s and 50s as long as you do not feel lightheaded or woozy.    - Please make an appointment to discuss with Dr. Camp about the diazepam and how it fits into your life.    - Ask Dr. Camp about who she recommends for ENT.    -I cannot stress enough that in my opinion you should schedule regular time with a counselor to help you learn better ways to live your happy as best life under the circumstances you been given. I would ask them to help you with coping skills regarding the situation with Demetri and your family. They can really help you.      It is my pleasure to participate in the care of Ms. Beaver.  Please do not hesitate to contact me with questions or concerns.    Breann Rios MD, [facility]  Cardiologist, [facility]    Please note that this dictation was created using voice recognition software. I have made every reasonable attempt to correct obvious errors, but it is possible there are errors of grammar and possibly content that I did not discover before finalizing the note.

## 2025-05-07 ENCOUNTER — OFFICE VISIT (OUTPATIENT)
Dept: CARDIOLOGY | Facility: MEDICAL CENTER | Age: 80
End: 2025-05-07
Attending: INTERNAL MEDICINE
Payer: MEDICARE

## 2025-05-07 VITALS
OXYGEN SATURATION: 98 % | HEART RATE: 59 BPM | BODY MASS INDEX: 25.52 KG/M2 | HEIGHT: 63 IN | SYSTOLIC BLOOD PRESSURE: 122 MMHG | DIASTOLIC BLOOD PRESSURE: 72 MMHG | WEIGHT: 144 LBS | RESPIRATION RATE: 16 BRPM

## 2025-05-07 DIAGNOSIS — E78.2 MIXED HYPERLIPIDEMIA: ICD-10-CM

## 2025-05-07 DIAGNOSIS — F32.A ANXIETY AND DEPRESSION: ICD-10-CM

## 2025-05-07 DIAGNOSIS — H91.8X3 OTHER SPECIFIED HEARING LOSS OF BOTH EARS: ICD-10-CM

## 2025-05-07 DIAGNOSIS — R00.2 PALPITATIONS: ICD-10-CM

## 2025-05-07 DIAGNOSIS — N18.31 STAGE 3A CHRONIC KIDNEY DISEASE: ICD-10-CM

## 2025-05-07 DIAGNOSIS — R94.31 ABNORMAL EKG: ICD-10-CM

## 2025-05-07 DIAGNOSIS — R00.1 SINUS BRADYCARDIA: ICD-10-CM

## 2025-05-07 DIAGNOSIS — F41.9 ANXIETY AND DEPRESSION: ICD-10-CM

## 2025-05-07 DIAGNOSIS — I72.9 ANEURYSM (HCC): ICD-10-CM

## 2025-05-07 DIAGNOSIS — I10 ESSENTIAL HYPERTENSION: ICD-10-CM

## 2025-05-07 PROCEDURE — 99214 OFFICE O/P EST MOD 30 MIN: CPT | Performed by: INTERNAL MEDICINE

## 2025-05-07 PROCEDURE — 99212 OFFICE O/P EST SF 10 MIN: CPT | Performed by: INTERNAL MEDICINE

## 2025-05-07 PROCEDURE — 3074F SYST BP LT 130 MM HG: CPT | Performed by: INTERNAL MEDICINE

## 2025-05-07 PROCEDURE — 3078F DIAST BP <80 MM HG: CPT | Performed by: INTERNAL MEDICINE

## 2025-05-07 ASSESSMENT — FIBROSIS 4 INDEX: FIB4 SCORE: 1.89

## 2025-05-07 NOTE — PATIENT INSTRUCTIONS
- Increase your propranolol from 1 tablet to 2 tablets.  This will take your total daily dose to 120 mg.  I want to make an extreme step up because I want to know if you feel better or worse.  If you feel worse for any reason, go back down to 1 tablet for 2 weeks, then discontinue the medication altogether.  If taking 2 pills helps you feel better, then stay on it.    -It is okay with me if the heart rates in the 40s and 50s as long as you do not feel lightheaded or woozy.    - Please make an appointment to discuss with Dr. Camp about the diazepam and how it fits into your life.    - Ask Dr. Camp about who she recommends for ENT.    -I cannot stress enough that in my opinion you should schedule regular time with a counselor to help you learn better ways to live your happy as best life under the circumstances you been given. I would ask them to help you with coping skills regarding the situation with Demetri and your family. They can really help you.

## 2025-05-08 DIAGNOSIS — Z97.4 HEARING AID WORN: ICD-10-CM

## 2025-05-08 DIAGNOSIS — H91.93 HEARING PROBLEM OF BOTH EARS: ICD-10-CM

## 2025-05-08 NOTE — Clinical Note
REFERRAL APPROVAL NOTICE         Sent on May 8, 2025                   Vandana Beaver  [address] [place] [address]                   Dear Ms. Mastersonbhavin,    After a careful review of the medical information and benefit coverage, [facility] has processed your referral. See below for additional details.    If applicable, you must be actively enrolled with your insurance for coverage of the authorized service. If you have any questions regarding your coverage, please contact your insurance directly.    REFERRAL INFORMATION   Referral #:  [id number]  Referred-To Provider    Referred-By Provider:  Otolaryngology    STEVIE Hansen JOSHUA C      [address] [facility] [address]  [place] [address]  [phone] [address] [place] [id number] [phone]    Referral Start Date:  05/08/2025  Referral End Date:   05/08/2026             SCHEDULING  If you do not already have an appointment, please call [phone] to make an appointment.     MORE INFORMATION  If you do not already have a [employer] account, sign up at: [email or web address]  You can access your medical information, make appointments, see lab results, billing information, and more.  If you have questions regarding this referral, please contact  the [facility] Referrals department at:             [phone]. Monday - Friday 8:00AM - 5:00PM.     Sincerely,    [facility]

## 2025-05-08 NOTE — PROGRESS NOTES
ENT referral for hearing problems placed.  Patient does have hearing aids but still does not have adequate hearing.

## 2025-05-15 DIAGNOSIS — N95.1 MENOPAUSAL SYMPTOMS: ICD-10-CM

## 2025-05-15 RX ORDER — ESTRADIOL 1 MG/1
1 TABLET ORAL DAILY
Qty: 90 TABLET | Refills: 0 | Status: SHIPPED | OUTPATIENT
Start: 2025-05-15

## 2025-05-21 ENCOUNTER — PATIENT MESSAGE (OUTPATIENT)
Dept: CARDIOLOGY | Facility: MEDICAL CENTER | Age: 80
End: 2025-05-21
Payer: MEDICARE

## 2025-05-31 NOTE — PROGRESS NOTES
Medical decision making:  -Working on blood pressure control. Her numbers are looking ok. Working on stress mgmt. blood pressure numbers look better  -Propranolol actually made her feel worse so she weaned off and feels better.  Was more to do with brain fuzziness.  -Had intermittent sinus resting bradycardia but I do not think is causing problems.  If this became problematic, pacemaker would be the only recourse  -I do not think she needs primary prevention statins.  I do not believe an incidental cerebral aneurysm requires statin therapy but rather blood pressure control.  -She believes that her hearing loss has led to reduced quality of life, again reached out to PCP in this regard as other hearing aids have not worked.  *Vandana has a lot on her plate worrying about Demetri.  She struggles to acclimate to having adult children in the home to help.  Her family is concerned about her health.  I am hoping she will get into counseling.   -RTC with me in 6 months    Problem list:  1. Sinus bradycardia    2. 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 Palpitations    4. Mixed hyperlipidemia    5. Essential hypertension  - losartan (COZAAR) 100 MG Tab; Take 0.5 Tablets by mouth 2 times a day. Pt cuts tab in half, takes one half in the morning and the other in the afternoon    6. Stage 3a chronic kidney disease    7. Anxiety and depression    8. NSAID long-term use     Chief Complaint:   Chief Complaint   Patient presents with    Hypertension    Dyslipidemia     History of Present Illness:  Vandana Beaver is a 80 y.o. female who returns for HTN, palpitations, CKD 3A, incidental left A2 anterior cerebral artery aneurysm, abnormal ECG, palpitations, anxiety, asymptomatic bradycardia.    Hypertension, working on control.  Checking at home now and writing it down.  Had an episode of of a thought might of been a panic attack.  EMS arrived and blood pressure was as high as 200.   Daughter was concerned it could have been a heart attack.  Was evaluated in the hospital, given amlodipine which caused significant ankle swelling.  We stopped amlodipine due to ankle edema.  She does not think it made any difference, has been added back at 2.5 mg.  Blood pressure still fairly consistently in the 130s but better, used to be 140s to 150s.  Diastolic overall is controlled in the 60s to 70s.  Titrating up losartan.  I did receive a message from her daughter since her last visit the blood pressures have been too high and that she takes it multiple times a day and only writes down the lowest.  I believe it turns out she is taking it 3 times and averaging but I cannot be certain if she is actually averaging or just taking the lowest numbers.  Overall her blood pressure numbers [facility] today do look better.    Noting heart rate in the 40s to 50s but no symptoms of bradycardia.    Higher doses of amlodipine caused worsening CVI.    We started on propranolol because of palpitations, left her feeling a little foggy and not certain if it is helping.    CKD 3A.    Hyperlipidemia, prior , now 96. TG elevated at times.   On statin and fish oil.    This ECG with low voltage not seen on prior but recent echocardiogram looks fine.  Nothing to note on physical exam.    Aneurysmal dilatation at the origin of left A2 anterior cerebral artery.    Anxiety is getting worse with some sensory deprivation from hearing loss.  Working on hearing aids.  I have asked her to see a counselor.  Previously giving diazepam but reluctant to take it because it can be habit-forming.    Having knee pain, sees Dr. Samir Abraham, getting injections.  She does not want to consider surgery right now because she is helping Demetri. Going for gel injection.     No family history of premature coronary artery disease.  No prior smoking history.  No history of diabetes.  No history of autoimmune disease such as lupus or rheumatoid arthritis.  No  history of chest radiation or cardiotoxic chemotherapy.  No chronic kidney disease.  No ETOH overuse.  No caffeine overuse.  No recreation substance use.  No hx asthma.    Not limited by chest pain, pressure or tightness with activity.  No significant dyspnea on exertion, orthopnea or lower extremity swelling.  No significant lightheadedness, or presyncope/syncope.  No symptoms of leg claudication.  No stroke/TIA like symptoms.    Lives in [place].   to Demetri has complex heart disease and sees us also.  Adult daughter and son-in-law have been staying at their house.    Wt Readings from Last 5 Encounters:   06/16/25 66.2 kg (146 lb)   05/07/25 65.3 kg (144 lb)   02/12/25 68.3 kg (150 lb 9.6 oz)   01/14/25 70.3 kg (155 lb)   11/25/24 70.3 kg (155 lb)       DATA REVIEWED by me:  ECG (my personal interpretation)  11-25-24 Sinus 72, LAD, low voltage throughout  4-23-24 sinus, 73    Echocardiogram  4-25-24  No prior study is available for comparison.   Normal left ventricular chamber size.  The ejection fraction is measured to be  70% by Gannon's biplane.  Normal inferior vena cava size and inspiratory collapse.  Mild aortic insufficiency.    Most recent labs:       Lab Results   Component Value Date/Time    WBC 5.7 04/24/2024 09:51 AM    WBC 7.1 05/13/2010 07:42 AM    HEMOGLOBIN 14.0 04/24/2024 09:51 AM    HEMATOCRIT 41.7 04/24/2024 09:51 AM    MCV 96.8 04/24/2024 09:51 AM    MCV 97 05/13/2010 07:42 AM    INR 0.98 04/23/2024 08:01 PM    TSH 2.390 09/09/2010 11:20 AM      Lab Results   Component Value Date/Time    SODIUM 137 06/27/2024 12:51 PM    POTASSIUM 4.6 06/27/2024 12:51 PM    CHLORIDE 107 06/27/2024 12:51 PM    CO2 20 06/27/2024 12:51 PM    GLUCOSE 93 06/27/2024 12:51 PM    BUN 18 06/27/2024 12:51 PM    CREATININE 1.10 06/27/2024 12:51 PM    CREATININE 1.00 05/13/2010 07:42 AM    GLOMRATE 56 (L) 05/13/2010 07:42 AM      Lab Results   Component Value Date/Time    ASTSGOT 16 04/24/2024 09:51 AM    ALTSGPT 12  "04/24/2024 09:51 AM    ALBUMIN 3.8 04/24/2024 09:51 AM      Lab Results   Component Value Date/Time    CHOLSTRLTOT 198 10/03/2023 11:40 AM    LDL 81 10/03/2023 11:40 AM    HDL 87 10/03/2023 11:40 AM    TRIGLYCERIDE 152 (H) 10/03/2023 11:40 AM     No results for input(s): \"NTPROBNP\", \"TROPONINT\" in the last 72 hours.      Past Medical History:   Diagnosis Date    Anesthesia     PONV    Caregiver stress 05/02/2024    Carpal tunnel syndrome of right wrist     Cataract     Chronic insomnia     since childhood    CKD (chronic kidney disease) stage 3, GFR 30-59 ml/min     Dyslipidemia, goal LDL below 160 05/2010    History of pneumonia     Hypothyroidism     Increased intraocular pressure 06/06/2017    MEDICAL HOME 10/23/2012    Obesity (BMI 30.0-34.9)     Pneumonia 01/08/2016    Seasonal allergies      Past Surgical History:   Procedure Laterality Date    US-NEEDLE CORE BX-BREAST PANEL Left 02/2018    CATARACT PHACO WITH IOL Right 9/14/2016    Procedure: CATARACT PHACO WITH IOL KPE;  Surgeon: Wesley Barber M.D.;  Location: SURGERY SAME DAY [facility];  Service:     CATARACT PHACO WITH IOL Left 9/1/2016    Procedure: CATARACT PHACO WITH IOL;  Surgeon: Wesley Barber M.D.;  Location: SURGERY SAME DAY [facility];  Service:     CARPAL TUNNEL RELEASE Right 4/15/2016    Procedure: CARPAL TUNNEL RELEASE;  Surgeon: Chalino Urrutia M.D.;  Location: [facility];  Service:     GIANFRANCO BY LAPAROSCOPY  11/11/2011    Performed by HECTOR TALAVERA at SURGERY [facility]    COLONOSCOPY  10/18/10    Dr. Madhu thomas    TONSILLECTOMY  1960    ABDOMINAL HYSTERECTOMY TOTAL  1980s    +ovaries + falopian, uterine fibroids     Family History   Problem Relation Age of Onset    Lung Disease Mother         66, heavy smoker, emphysema    Osteoporosis Mother     No Known Problems Father     Diabetes Maternal Grandfather     No Known Problems Daughter     No Known Problems Daughter     No Known Problems Daughter     No Known " Problems Son      Social History     Socioeconomic History    Marital status:      Spouse name: Not on file    Number of children: Not on file    Years of education: Not on file    Highest education level: Not on file   Occupational History    Not on file   Tobacco Use    Smoking status: Never    Smokeless tobacco: Never   Vaping Use    Vaping status: Never Used   Substance and Sexual Activity    Alcohol use: Yes     Comment: very seldom    Drug use: No    Sexual activity: Not Currently     Birth control/protection: Post-Menopausal   Other Topics Concern    Not on file   Social History Narrative    Not on file     Social Drivers of Health     Financial Resource Strain: Not on file   Food Insecurity: Not on file   Transportation Needs: Not on file   Physical Activity: Not on file   Stress: Not on file   Social Connections: Not on file   Intimate Partner Violence: Not on file   Housing Stability: Not on file     Allergies   Allergen Reactions    Atorvastatin      Not needed for primary prevention.  She has never actually taken the medication.    Codeine Vomiting    Propranolol      Felt poorly, fuzzy.       Current Outpatient Medications   Medication Sig Dispense Refill    losartan (COZAAR) 100 MG Tab Take 0.5 Tablets by mouth 2 times a day. Pt cuts tab in half, takes one half in the morning and the other in the afternoon      zolpidem (AMBIEN) 10 MG Tab Take 1 Tablet by mouth at bedtime for 90 days. 30 tablets is a 30-day quantity. 30 Tablet 2    estradiol (ESTRACE) 1 MG Tab Take 1 tablet by mouth once daily 90 Tablet 0    atorvastatin (LIPITOR) 10 MG Tab TAKE 1 TABLET BY MOUTH ONCE DAILY IN THE EVENING 100 Tablet 3    amLODIPine (NORVASC) 2.5 MG Tab Take 1 Tablet by mouth every day. 100 Tablet 3    escitalopram (LEXAPRO) 5 MG tablet Take 1 Tablet by mouth every day. 0.5 tab morning and 0.5 tab evening      levothyroxine (SYNTHROID) 50 MCG Tab TAKE 1 TABLET BY MOUTH IN THE MORNING ON AN EMPTY STOMACH 90 Tablet  "2     No current facility-administered medications for this visit.       ROS  All others systems reviewed and negative.    /70 (BP Location: Left arm, Patient Position: Sitting, BP Cuff Size: Adult)   Pulse 67   Ht 1.6 m (5' 3\")   Wt 66.2 kg (146 lb)   SpO2 98%  Body mass index is 25.86 kg/m².    General: No acute distress. Well nourished.  HEENT: EOM grossly intact, no scleral icterus, no pharyngeal erythema.   Neck:  No JVD, no bruits, trachea midline  CVS: RRR, rare ectopy. No arsen M/R/G. No sign LE edema.  2+ radial pulses, 2+ PT pulses  Resp: CTAB. No wheezing or crackles/rhonchi. Normal respiratory effort.  Abdomen: Soft, NT, no arsen hepatomegaly.  MSK/Ext: No clubbing or cyanosis.  Skin: Warm and dry, no rashes.  Neurological: CN III-XII grossly intact. No focal deficits.   Psych: A&O x 3, appropriate affect, good judgement, less anxious today    Physical Exam listed below was completed in entrety today and unchanged from 5-7-25 except where noted.  Some elements were copied from my note of that same day, which have been updated where appropriate and all reflect current meedical decision making from today.  I have confirmed and edited as necessary, the PFSH and ROS obtained by others.    Return in about 6 months (around 12/16/2025).    Written instructions given today:    None.      It is my pleasure to participate in the care of Ms. Beaver.  Please do not hesitate to contact me with questions or concerns.    Breann Rios MD, [facility]  Cardiologist, [facility]    Please note that this dictation was created using voice recognition software. I have made every reasonable attempt to correct obvious errors, but it is possible there are errors of grammar and possibly content that I did not discover before finalizing the note.  "

## 2025-06-13 DIAGNOSIS — F51.04 CHRONIC INSOMNIA: ICD-10-CM

## 2025-06-13 RX ORDER — ZOLPIDEM TARTRATE 10 MG/1
10 TABLET ORAL
Qty: 30 TABLET | Refills: 2 | Status: SHIPPED | OUTPATIENT
Start: 2025-06-13 | End: 2025-09-11

## 2025-06-13 NOTE — TELEPHONE ENCOUNTER
Received request via: Pharmacy    Was the patient seen in the last year in this department? Yes    Does the patient have an active prescription (recently filled or refills available) for medication(s) requested? No    Pharmacy Name: : [id number] - [place], [place] - [address]     Does the patient have [facility] and need 100-day supply? (This applies to ALL medications) Yes, quantity updated to 100 days

## 2025-06-14 NOTE — TELEPHONE ENCOUNTER
Patient last seen in February.  She has an appointment scheduled with me for August.  She is within the required 180-day timeframe.  She has tolerated the zolpidem well.  PDMP review shows no inconsistencies.  The medication is renewed.

## 2025-06-16 ENCOUNTER — OFFICE VISIT (OUTPATIENT)
Facility: MEDICAL CENTER | Age: 80
End: 2025-06-16
Attending: INTERNAL MEDICINE
Payer: MEDICARE

## 2025-06-16 VITALS
HEIGHT: 63 IN | HEART RATE: 67 BPM | BODY MASS INDEX: 25.87 KG/M2 | OXYGEN SATURATION: 98 % | WEIGHT: 146 LBS | DIASTOLIC BLOOD PRESSURE: 70 MMHG | SYSTOLIC BLOOD PRESSURE: 134 MMHG

## 2025-06-16 DIAGNOSIS — E78.2 MIXED HYPERLIPIDEMIA: ICD-10-CM

## 2025-06-16 DIAGNOSIS — F41.9 ANXIETY AND DEPRESSION: ICD-10-CM

## 2025-06-16 DIAGNOSIS — I72.9 ANEURYSM (HCC): ICD-10-CM

## 2025-06-16 DIAGNOSIS — F32.A ANXIETY AND DEPRESSION: ICD-10-CM

## 2025-06-16 DIAGNOSIS — R00.2 PALPITATIONS: ICD-10-CM

## 2025-06-16 DIAGNOSIS — R00.1 SINUS BRADYCARDIA: Primary | ICD-10-CM

## 2025-06-16 DIAGNOSIS — I10 ESSENTIAL HYPERTENSION: ICD-10-CM

## 2025-06-16 DIAGNOSIS — Z79.1 NSAID LONG-TERM USE: ICD-10-CM

## 2025-06-16 DIAGNOSIS — N18.31 STAGE 3A CHRONIC KIDNEY DISEASE: ICD-10-CM

## 2025-06-16 PROCEDURE — 99212 OFFICE O/P EST SF 10 MIN: CPT | Performed by: INTERNAL MEDICINE

## 2025-06-16 PROCEDURE — 3078F DIAST BP <80 MM HG: CPT | Performed by: INTERNAL MEDICINE

## 2025-06-16 PROCEDURE — 99214 OFFICE O/P EST MOD 30 MIN: CPT | Performed by: INTERNAL MEDICINE

## 2025-06-16 PROCEDURE — 3075F SYST BP GE 130 - 139MM HG: CPT | Performed by: INTERNAL MEDICINE

## 2025-06-16 RX ORDER — LOSARTAN POTASSIUM 100 MG/1
50 TABLET ORAL 2 TIMES DAILY
COMMUNITY
Start: 2025-06-16

## 2025-06-16 ASSESSMENT — FIBROSIS 4 INDEX: FIB4 SCORE: 1.91

## 2025-06-16 NOTE — LETTER
[facility] [place] [facility] [facility]   [address],   [address]  [place] [address]  Phone: [phone]  Fax: [phone]              Vandana Beaver  1945    Encounter Date: 6/16/2025    Breann Rios M.D.          PROGRESS NOTE:        Medical decision making:  -Working on blood pressure control. Her numbers are looking ok. Working on stress mgmt. blood pressure numbers look better  -Propranolol actually made her feel worse so she weaned off and feels better.  Was more to do with brain fuzziness.  -Had intermittent sinus resting bradycardia but I do not think is causing problems.  If this became problematic, pacemaker would be the only recourse  -I do not think she needs primary prevention statins.  I do not believe an incidental cerebral aneurysm requires statin therapy but rather blood pressure control.  -She believes that her hearing loss has led to reduced quality of life, again reached out to PCP in this regard as other hearing aids have not worked.  *Vandana has a lot on her plate worrying about Demetri.  She struggles to acclimate to having adult children in the home to help.  Her family is concerned about her health.  I am hoping she will get into counseling.   -RTC with me in 6 months    Problem list:  1. Sinus bradycardia    2. 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 Palpitations    4. Mixed hyperlipidemia    5. Essential hypertension  - losartan (COZAAR) 100 MG Tab; Take 0.5 Tablets by mouth 2 times a day. Pt cuts tab in half, takes one half in the morning and the other in the afternoon    6. Stage 3a chronic kidney disease    7. Anxiety and depression    8. NSAID long-term use     Chief Complaint:   Chief Complaint   Patient presents with    Hypertension    Dyslipidemia     History of Present Illness:  Vandana Beaver is a 80 y.o. female who returns for HTN, palpitations, CKD 3A, incidental left A2 anterior cerebral  artery aneurysm, abnormal ECG, palpitations, anxiety, asymptomatic bradycardia.    Hypertension, working on control.  Checking at home now and writing it down.  Had an episode of of a thought might of been a panic attack.  EMS arrived and blood pressure was as high as 200.  Daughter was concerned it could have been a heart attack.  Was evaluated in the hospital, given amlodipine which caused significant ankle swelling.  We stopped amlodipine due to ankle edema.  She does not think it made any difference, has been added back at 2.5 mg.  Blood pressure still fairly consistently in the 130s but better, used to be 140s to 150s.  Diastolic overall is controlled in the 60s to 70s.  Titrating up losartan.  I did receive a message from her daughter since her last visit the blood pressures have been too high and that she takes it multiple times a day and only writes down the lowest.  I believe it turns out she is taking it 3 times and averaging but I cannot be certain if she is actually averaging or just taking the lowest numbers.  Overall her blood pressure numbers [facility] today do look better.    Noting heart rate in the 40s to 50s but no symptoms of bradycardia.    Higher doses of amlodipine caused worsening CVI.    We started on propranolol because of palpitations, left her feeling a little foggy and not certain if it is helping.    CKD 3A.    Hyperlipidemia, prior , now 96. TG elevated at times.   On statin and fish oil.    This ECG with low voltage not seen on prior but recent echocardiogram looks fine.  Nothing to note on physical exam.    Aneurysmal dilatation at the origin of left A2 anterior cerebral artery.    Anxiety is getting worse with some sensory deprivation from hearing loss.  Working on hearing aids.  I have asked her to see a counselor.  Previously giving diazepam but reluctant to take it because it can be habit-forming.    Having knee pain, sees Dr. Samir Abraham, getting injections.  She does not  want to consider surgery right now because she is helping Demetri. Going for gel injection.     No family history of premature coronary artery disease.  No prior smoking history.  No history of diabetes.  No history of autoimmune disease such as lupus or rheumatoid arthritis.  No history of chest radiation or cardiotoxic chemotherapy.  No chronic kidney disease.  No ETOH overuse.  No caffeine overuse.  No recreation substance use.  No hx asthma.    Not limited by chest pain, pressure or tightness with activity.  No significant dyspnea on exertion, orthopnea or lower extremity swelling.  No significant lightheadedness, or presyncope/syncope.  No symptoms of leg claudication.  No stroke/TIA like symptoms.    Lives in [place].   to Demetri has complex heart disease and sees us also.  Adult daughter and son-in-law have been staying at their house.    Wt Readings from Last 5 Encounters:   06/16/25 66.2 kg (146 lb)   05/07/25 65.3 kg (144 lb)   02/12/25 68.3 kg (150 lb 9.6 oz)   01/14/25 70.3 kg (155 lb)   11/25/24 70.3 kg (155 lb)       DATA REVIEWED by me:  ECG (my personal interpretation)  11-25-24 Sinus 72, LAD, low voltage throughout  4-23-24 sinus, 73    Echocardiogram  4-25-24  No prior study is available for comparison.   Normal left ventricular chamber size.  The ejection fraction is measured to be  70% by Gannon's biplane.  Normal inferior vena cava size and inspiratory collapse.  Mild aortic insufficiency.    Most recent labs:       Lab Results   Component Value Date/Time    WBC 5.7 04/24/2024 09:51 AM    WBC 7.1 05/13/2010 07:42 AM    HEMOGLOBIN 14.0 04/24/2024 09:51 AM    HEMATOCRIT 41.7 04/24/2024 09:51 AM    MCV 96.8 04/24/2024 09:51 AM    MCV 97 05/13/2010 07:42 AM    INR 0.98 04/23/2024 08:01 PM    TSH 2.390 09/09/2010 11:20 AM      Lab Results   Component Value Date/Time    SODIUM 137 06/27/2024 12:51 PM    POTASSIUM 4.6 06/27/2024 12:51 PM    CHLORIDE 107 06/27/2024 12:51 PM    CO2 20 06/27/2024 12:51 PM  "   GLUCOSE 93 06/27/2024 12:51 PM    BUN 18 06/27/2024 12:51 PM    CREATININE 1.10 06/27/2024 12:51 PM    CREATININE 1.00 05/13/2010 07:42 AM    GLOMRATE 56 (L) 05/13/2010 07:42 AM      Lab Results   Component Value Date/Time    ASTSGOT 16 04/24/2024 09:51 AM    ALTSGPT 12 04/24/2024 09:51 AM    ALBUMIN 3.8 04/24/2024 09:51 AM      Lab Results   Component Value Date/Time    CHOLSTRLTOT 198 10/03/2023 11:40 AM    LDL 81 10/03/2023 11:40 AM    HDL 87 10/03/2023 11:40 AM    TRIGLYCERIDE 152 (H) 10/03/2023 11:40 AM     No results for input(s): \"NTPROBNP\", \"TROPONINT\" in the last 72 hours.      Past Medical History:   Diagnosis Date    Anesthesia     PONV    Caregiver stress 05/02/2024    Carpal tunnel syndrome of right wrist     Cataract     Chronic insomnia     since childhood    CKD (chronic kidney disease) stage 3, GFR 30-59 ml/min     Dyslipidemia, goal LDL below 160 05/2010    History of pneumonia     Hypothyroidism     Increased intraocular pressure 06/06/2017    MEDICAL HOME 10/23/2012    Obesity (BMI 30.0-34.9)     Pneumonia 01/08/2016    Seasonal allergies      Past Surgical History:   Procedure Laterality Date    US-NEEDLE CORE BX-BREAST PANEL Left 02/2018    CATARACT PHACO WITH IOL Right 9/14/2016    Procedure: CATARACT PHACO WITH IOL KPE;  Surgeon: Wesley Barber M.D.;  Location: SURGERY SAME DAY [facility] ORS;  Service:     CATARACT PHACO WITH IOL Left 9/1/2016    Procedure: CATARACT PHACO WITH IOL;  Surgeon: Wesley Barber M.D.;  Location: SURGERY SAME DAY [facility] ORS;  Service:     CARPAL TUNNEL RELEASE Right 4/15/2016    Procedure: CARPAL TUNNEL RELEASE;  Surgeon: Chalino Urrutia M.D.;  Location: SURGERY [facility];  Service:     GIANFRANCO BY LAPAROSCOPY  11/11/2011    Performed by HECTOR TALAVERA at [facility]    COLONOSCOPY  10/18/10    Dr. Madhu thomas    TONSILLECTOMY  1960    ABDOMINAL HYSTERECTOMY TOTAL  1980s    +ovaries + falopian, uterine fibroids     Family History   Problem " Relation Age of Onset    Lung Disease Mother         66, heavy smoker, emphysema    Osteoporosis Mother     No Known Problems Father     Diabetes Maternal Grandfather     No Known Problems Daughter     No Known Problems Daughter     No Known Problems Daughter     No Known Problems Son      Social History     Socioeconomic History    Marital status:      Spouse name: Not on file    Number of children: Not on file    Years of education: Not on file    Highest education level: Not on file   Occupational History    Not on file   Tobacco Use    Smoking status: Never    Smokeless tobacco: Never   Vaping Use    Vaping status: Never Used   Substance and Sexual Activity    Alcohol use: Yes     Comment: very seldom    Drug use: No    Sexual activity: Not Currently     Birth control/protection: Post-Menopausal   Other Topics Concern    Not on file   Social History Narrative    Not on file     Social Drivers of Health     Financial Resource Strain: Not on file   Food Insecurity: Not on file   Transportation Needs: Not on file   Physical Activity: Not on file   Stress: Not on file   Social Connections: Not on file   Intimate Partner Violence: Not on file   Housing Stability: Not on file     Allergies   Allergen Reactions    Atorvastatin      Not needed for primary prevention.  She has never actually taken the medication.    Codeine Vomiting    Propranolol      Felt poorly, fuzzy.       Current Outpatient Medications   Medication Sig Dispense Refill    losartan (COZAAR) 100 MG Tab Take 0.5 Tablets by mouth 2 times a day. Pt cuts tab in half, takes one half in the morning and the other in the afternoon      zolpidem (AMBIEN) 10 MG Tab Take 1 Tablet by mouth at bedtime for 90 days. 30 tablets is a 30-day quantity. 30 Tablet 2    estradiol (ESTRACE) 1 MG Tab Take 1 tablet by mouth once daily 90 Tablet 0    atorvastatin (LIPITOR) 10 MG Tab TAKE 1 TABLET BY MOUTH ONCE DAILY IN THE EVENING 100 Tablet 3    amLODIPine (NORVASC)  "2.5 MG Tab Take 1 Tablet by mouth every day. 100 Tablet 3    escitalopram (LEXAPRO) 5 MG tablet Take 1 Tablet by mouth every day. 0.5 tab morning and 0.5 tab evening      levothyroxine (SYNTHROID) 50 MCG Tab TAKE 1 TABLET BY MOUTH IN THE MORNING ON AN EMPTY STOMACH 90 Tablet 2     No current facility-administered medications for this visit.       ROS  All others systems reviewed and negative.    /70 (BP Location: Left arm, Patient Position: Sitting, BP Cuff Size: Adult)   Pulse 67   Ht 1.6 m (5' 3\")   Wt 66.2 kg (146 lb)   SpO2 98%  Body mass index is 25.86 kg/m².    General: No acute distress. Well nourished.  HEENT: EOM grossly intact, no scleral icterus, no pharyngeal erythema.   Neck:  No JVD, no bruits, trachea midline  CVS: RRR, rare ectopy. No arsen M/R/G. No sign LE edema.  2+ radial pulses, 2+ PT pulses  Resp: CTAB. No wheezing or crackles/rhonchi. Normal respiratory effort.  Abdomen: Soft, NT, no arsen hepatomegaly.  MSK/Ext: No clubbing or cyanosis.  Skin: Warm and dry, no rashes.  Neurological: CN III-XII grossly intact. No focal deficits.   Psych: A&O x 3, appropriate affect, good judgement, less anxious today    Physical Exam listed below was completed in entrety today and unchanged from 5-7-25 except where noted.  Some elements were copied from my note of that same day, which have been updated where appropriate and all reflect current meedical decision making from today.  I have confirmed and edited as necessary, the PFSH and ROS obtained by others.    Return in about 6 months (around 12/16/2025).    Written instructions given today:    None.      It is my pleasure to participate in the care of Ms. Beaver.  Please do not hesitate to contact me with questions or concerns.    Breann Rios MD, [facility]  Cardiologist, [facility]    Please note that this dictation was created using voice recognition software. I have made every reasonable attempt to correct " obvious errors, but it is possible there are errors of grammar and possibly content that I did not discover before finalizing the note.        Esteban Camp M.D.  [address] [place] [id number]  Via In Basket

## 2025-07-09 DIAGNOSIS — F43.21 SITUATIONAL DEPRESSION: ICD-10-CM

## 2025-07-09 RX ORDER — ESCITALOPRAM OXALATE 5 MG/1
5 TABLET ORAL DAILY
Qty: 100 TABLET | Refills: 2 | Status: SHIPPED | OUTPATIENT
Start: 2025-07-09

## 2025-08-06 ENCOUNTER — HOSPITAL ENCOUNTER (OUTPATIENT)
Dept: RADIOLOGY | Facility: MEDICAL CENTER | Age: 80
End: 2025-08-06
Attending: OTOLARYNGOLOGY
Payer: MEDICARE

## 2025-08-06 DIAGNOSIS — H90.3 SENSORY HEARING LOSS, BILATERAL: ICD-10-CM

## 2025-08-06 PROCEDURE — 700117 HCHG RX CONTRAST REV CODE 255: Mod: JZ

## 2025-08-06 PROCEDURE — 70553 MRI BRAIN STEM W/O & W/DYE: CPT

## 2025-08-06 PROCEDURE — A9579 GAD-BASE MR CONTRAST NOS,1ML: HCPCS | Mod: JZ

## 2025-08-06 RX ORDER — GADOTERIDOL 279.3 MG/ML
12 INJECTION INTRAVENOUS ONCE
Status: COMPLETED | OUTPATIENT
Start: 2025-08-06 | End: 2025-08-06

## 2025-08-06 RX ADMIN — GADOTERIDOL 12 ML: 279.3 INJECTION, SOLUTION INTRAVENOUS at 14:42

## 2025-08-07 DIAGNOSIS — N95.1 MENOPAUSAL SYMPTOMS: ICD-10-CM

## 2025-08-07 RX ORDER — ESTRADIOL 1 MG/1
1 TABLET ORAL DAILY
Qty: 90 TABLET | Refills: 0 | Status: SHIPPED | OUTPATIENT
Start: 2025-08-07

## 2025-08-11 ENCOUNTER — HOSPITAL ENCOUNTER (OUTPATIENT)
Dept: RADIOLOGY | Facility: MEDICAL CENTER | Age: 80
End: 2025-08-11
Attending: FAMILY MEDICINE
Payer: MEDICARE

## 2025-08-11 VITALS — HEIGHT: 64 IN | BODY MASS INDEX: 24.59 KG/M2 | WEIGHT: 144 LBS

## 2025-08-11 DIAGNOSIS — Z12.31 VISIT FOR SCREENING MAMMOGRAM: ICD-10-CM

## 2025-08-11 PROCEDURE — 77067 SCR MAMMO BI INCL CAD: CPT

## 2025-08-11 ASSESSMENT — FIBROSIS 4 INDEX: FIB4 SCORE: 1.91

## 2025-08-15 ENCOUNTER — OFFICE VISIT (OUTPATIENT)
Dept: MEDICAL GROUP | Facility: MEDICAL CENTER | Age: 80
End: 2025-08-15
Payer: MEDICARE

## 2025-08-15 VITALS
OXYGEN SATURATION: 97 % | DIASTOLIC BLOOD PRESSURE: 54 MMHG | SYSTOLIC BLOOD PRESSURE: 122 MMHG | WEIGHT: 143.6 LBS | HEART RATE: 63 BPM | BODY MASS INDEX: 24.52 KG/M2 | HEIGHT: 64 IN | TEMPERATURE: 97.3 F | RESPIRATION RATE: 16 BRPM

## 2025-08-15 DIAGNOSIS — F51.04 CHRONIC INSOMNIA: ICD-10-CM

## 2025-08-15 DIAGNOSIS — E03.4 HYPOTHYROIDISM DUE TO ACQUIRED ATROPHY OF THYROID: ICD-10-CM

## 2025-08-15 DIAGNOSIS — E78.5 DYSLIPIDEMIA, GOAL LDL BELOW 160: ICD-10-CM

## 2025-08-15 DIAGNOSIS — F13.20 SEDATIVE, HYPNOTIC OR ANXIOLYTIC DEPENDENCE, UNCOMPLICATED (HCC): ICD-10-CM

## 2025-08-15 DIAGNOSIS — I10 ESSENTIAL HYPERTENSION: ICD-10-CM

## 2025-08-15 PROCEDURE — 3078F DIAST BP <80 MM HG: CPT | Performed by: FAMILY MEDICINE

## 2025-08-15 PROCEDURE — 3074F SYST BP LT 130 MM HG: CPT | Performed by: FAMILY MEDICINE

## 2025-08-15 PROCEDURE — 99214 OFFICE O/P EST MOD 30 MIN: CPT | Performed by: FAMILY MEDICINE

## 2025-08-15 RX ORDER — ZOLPIDEM TARTRATE 10 MG/1
10 TABLET ORAL
Qty: 30 TABLET | Refills: 5 | Status: SHIPPED | OUTPATIENT
Start: 2025-08-15 | End: 2026-02-11

## 2025-08-15 ASSESSMENT — FIBROSIS 4 INDEX: FIB4 SCORE: 1.91
